# Patient Record
Sex: FEMALE | Race: WHITE | NOT HISPANIC OR LATINO | Employment: OTHER | URBAN - METROPOLITAN AREA
[De-identification: names, ages, dates, MRNs, and addresses within clinical notes are randomized per-mention and may not be internally consistent; named-entity substitution may affect disease eponyms.]

---

## 2017-01-02 ENCOUNTER — HOSPITAL ENCOUNTER (OUTPATIENT)
Dept: RADIOLOGY | Facility: HOSPITAL | Age: 82
Discharge: HOME | End: 2017-01-02
Payer: MEDICARE

## 2017-01-02 ENCOUNTER — TRANSCRIBE ORDERS (OUTPATIENT)
Dept: ADMINISTRATIVE | Facility: HOSPITAL | Age: 82
End: 2017-01-02

## 2017-01-02 DIAGNOSIS — M19.011 OSTEOARTHRITIS OF RIGHT SHOULDER, UNSPECIFIED OSTEOARTHRITIS TYPE: ICD-10-CM

## 2017-01-02 DIAGNOSIS — M19.011 OSTEOARTHRITIS OF RIGHT SHOULDER, UNSPECIFIED OSTEOARTHRITIS TYPE: Primary | ICD-10-CM

## 2017-01-02 PROCEDURE — 73030 X-RAY EXAM OF SHOULDER: CPT

## 2017-01-11 ENCOUNTER — TRANSCRIBE ORDERS (OUTPATIENT)
Dept: ADMINISTRATIVE | Facility: HOSPITAL | Age: 82
End: 2017-01-11

## 2017-01-11 ENCOUNTER — HOSPITAL ENCOUNTER (OUTPATIENT)
Dept: NON INVASIVE DIAGNOSTICS | Facility: HOSPITAL | Age: 82
Discharge: HOME | End: 2017-01-11
Attending: INTERNAL MEDICINE
Payer: MEDICARE

## 2017-01-11 ENCOUNTER — HOSPITAL ENCOUNTER (OUTPATIENT)
Dept: NON INVASIVE DIAGNOSTICS | Facility: HOSPITAL | Age: 82
Discharge: HOME | End: 2017-01-11
Attending: SURGERY
Payer: MEDICARE

## 2017-01-11 DIAGNOSIS — I35.9 NONRHEUMATIC AORTIC VALVE DISORDER: ICD-10-CM

## 2017-01-11 DIAGNOSIS — I25.10 ATHEROSCLEROTIC HEART DISEASE OF NATIVE CORONARY ARTERY WITHOUT ANGINA PECTORIS: ICD-10-CM

## 2017-01-11 DIAGNOSIS — I10 ESSENTIAL (PRIMARY) HYPERTENSION: ICD-10-CM

## 2017-01-11 DIAGNOSIS — E78.5 HYPERLIPIDEMIA: ICD-10-CM

## 2017-01-11 PROCEDURE — 93306 TTE W/DOPPLER COMPLETE: CPT

## 2017-01-11 PROCEDURE — 93880 EXTRACRANIAL BILAT STUDY: CPT

## 2017-01-18 ENCOUNTER — ALLSCRIPTS OFFICE VISIT (OUTPATIENT)
Dept: OTHER | Facility: OTHER | Age: 82
End: 2017-01-18

## 2017-02-20 ENCOUNTER — TRANSCRIBE ORDERS (OUTPATIENT)
Dept: ADMINISTRATIVE | Facility: HOSPITAL | Age: 82
End: 2017-02-20

## 2017-02-20 ENCOUNTER — HOSPITAL ENCOUNTER (OUTPATIENT)
Dept: RADIOLOGY | Facility: HOSPITAL | Age: 82
Discharge: HOME | End: 2017-02-20
Attending: INTERNAL MEDICINE
Payer: MEDICARE

## 2017-02-20 ENCOUNTER — ALLSCRIPTS OFFICE VISIT (OUTPATIENT)
Dept: OTHER | Facility: OTHER | Age: 82
End: 2017-02-20

## 2017-02-20 DIAGNOSIS — R07.89 OTHER CHEST PAIN: ICD-10-CM

## 2017-02-20 DIAGNOSIS — N64.4 SORENESS BREAST: Primary | ICD-10-CM

## 2017-02-20 DIAGNOSIS — N64.4 MASTODYNIA: ICD-10-CM

## 2017-02-20 DIAGNOSIS — I25.10 ATHEROSCLEROTIC HEART DISEASE OF NATIVE CORONARY ARTERY WITHOUT ANGINA PECTORIS: ICD-10-CM

## 2017-02-20 PROCEDURE — 71020 HB CHEST X-RAY 2VW FRONTAL&LATL: CPT

## 2017-02-21 ENCOUNTER — GENERIC CONVERSION - ENCOUNTER (OUTPATIENT)
Dept: OTHER | Facility: OTHER | Age: 82
End: 2017-02-21

## 2017-02-21 ENCOUNTER — APPOINTMENT (OUTPATIENT)
Dept: LAB | Facility: HOSPITAL | Age: 82
End: 2017-02-21
Attending: INTERNAL MEDICINE
Payer: MEDICARE

## 2017-02-21 DIAGNOSIS — I25.10 ATHEROSCLEROTIC HEART DISEASE OF NATIVE CORONARY ARTERY WITHOUT ANGINA PECTORIS: ICD-10-CM

## 2017-02-21 LAB
ALBUMIN SERPL BCP-MCNC: 3.5 G/DL (ref 3.5–5)
ALP SERPL-CCNC: 70 U/L (ref 46–116)
ALT SERPL W P-5'-P-CCNC: 20 U/L (ref 12–78)
ANION GAP SERPL CALCULATED.3IONS-SCNC: 9 MMOL/L (ref 4–13)
AST SERPL W P-5'-P-CCNC: 16 U/L (ref 5–45)
BASOPHILS # BLD AUTO: 0.02 THOUSANDS/ΜL (ref 0–0.1)
BASOPHILS NFR BLD AUTO: 0 % (ref 0–1)
BILIRUB SERPL-MCNC: 0.5 MG/DL (ref 0.2–1)
BUN SERPL-MCNC: 19 MG/DL (ref 5–25)
CALCIUM SERPL-MCNC: 8.9 MG/DL (ref 8.3–10.1)
CHLORIDE SERPL-SCNC: 105 MMOL/L (ref 100–108)
CHOLEST SERPL-MCNC: 162 MG/DL (ref 50–200)
CO2 SERPL-SCNC: 29 MMOL/L (ref 21–32)
CREAT SERPL-MCNC: 0.88 MG/DL (ref 0.6–1.3)
EOSINOPHIL # BLD AUTO: 0.06 THOUSAND/ΜL (ref 0–0.61)
EOSINOPHIL NFR BLD AUTO: 1 % (ref 0–6)
ERYTHROCYTE [DISTWIDTH] IN BLOOD BY AUTOMATED COUNT: 14.9 % (ref 11.6–15.1)
GFR SERPL CREATININE-BSD FRML MDRD: >60 ML/MIN/1.73SQ M
GLUCOSE SERPL-MCNC: 87 MG/DL (ref 65–140)
HCT VFR BLD AUTO: 41.3 % (ref 34.8–46.1)
HDLC SERPL-MCNC: 82 MG/DL (ref 40–60)
HGB BLD-MCNC: 13.3 G/DL (ref 11.5–15.4)
LDLC SERPL CALC-MCNC: 69 MG/DL (ref 0–100)
LYMPHOCYTES # BLD AUTO: 2.45 THOUSANDS/ΜL (ref 0.6–4.47)
LYMPHOCYTES NFR BLD AUTO: 34 % (ref 14–44)
MCH RBC QN AUTO: 28.9 PG (ref 26.8–34.3)
MCHC RBC AUTO-ENTMCNC: 32.2 G/DL (ref 31.4–37.4)
MCV RBC AUTO: 90 FL (ref 82–98)
MONOCYTES # BLD AUTO: 0.84 THOUSAND/ΜL (ref 0.17–1.22)
MONOCYTES NFR BLD AUTO: 12 % (ref 4–12)
NEUTROPHILS # BLD AUTO: 3.85 THOUSANDS/ΜL (ref 1.85–7.62)
NEUTS SEG NFR BLD AUTO: 53 % (ref 43–75)
PLATELET # BLD AUTO: 159 THOUSANDS/UL (ref 149–390)
PMV BLD AUTO: 11.3 FL (ref 8.9–12.7)
POTASSIUM SERPL-SCNC: 3.7 MMOL/L (ref 3.5–5.3)
PROT SERPL-MCNC: 7.1 G/DL (ref 6.4–8.2)
RBC # BLD AUTO: 4.6 MILLION/UL (ref 3.81–5.12)
SODIUM SERPL-SCNC: 143 MMOL/L (ref 136–145)
TRIGL SERPL-MCNC: 55 MG/DL
WBC # BLD AUTO: 7.22 THOUSAND/UL (ref 4.31–10.16)

## 2017-02-21 PROCEDURE — 85025 COMPLETE CBC W/AUTO DIFF WBC: CPT

## 2017-02-21 PROCEDURE — 80053 COMPREHEN METABOLIC PANEL: CPT

## 2017-02-21 PROCEDURE — 80061 LIPID PANEL: CPT

## 2017-02-21 PROCEDURE — 36415 COLL VENOUS BLD VENIPUNCTURE: CPT

## 2017-02-23 ENCOUNTER — GENERIC CONVERSION - ENCOUNTER (OUTPATIENT)
Dept: OTHER | Facility: OTHER | Age: 82
End: 2017-02-23

## 2017-02-23 ENCOUNTER — HOSPITAL ENCOUNTER (OUTPATIENT)
Dept: MAMMOGRAPHY | Facility: CLINIC | Age: 82
Discharge: HOME | End: 2017-02-23
Payer: MEDICARE

## 2017-02-23 ENCOUNTER — HOSPITAL ENCOUNTER (OUTPATIENT)
Dept: ULTRASOUND IMAGING | Facility: CLINIC | Age: 82
Discharge: HOME | End: 2017-02-23
Payer: MEDICARE

## 2017-02-23 DIAGNOSIS — N64.4 MASTODYNIA: ICD-10-CM

## 2017-02-23 DIAGNOSIS — N64.4 SORENESS BREAST: ICD-10-CM

## 2017-02-23 PROCEDURE — 76642 ULTRASOUND BREAST LIMITED: CPT

## 2017-02-23 PROCEDURE — G0204 DX MAMMO INCL CAD BI: HCPCS

## 2017-02-24 ENCOUNTER — GENERIC CONVERSION - ENCOUNTER (OUTPATIENT)
Dept: OTHER | Facility: OTHER | Age: 82
End: 2017-02-24

## 2017-04-18 ENCOUNTER — ALLSCRIPTS OFFICE VISIT (OUTPATIENT)
Dept: OTHER | Facility: OTHER | Age: 82
End: 2017-04-18

## 2017-04-18 DIAGNOSIS — I65.29 OCCLUSION AND STENOSIS OF UNSPECIFIED CAROTID ARTERY: ICD-10-CM

## 2017-04-18 DIAGNOSIS — Z13.820 ENCOUNTER FOR SCREENING FOR OSTEOPOROSIS: ICD-10-CM

## 2017-07-26 ENCOUNTER — ALLSCRIPTS OFFICE VISIT (OUTPATIENT)
Dept: OTHER | Facility: OTHER | Age: 82
End: 2017-07-26

## 2017-09-07 ENCOUNTER — APPOINTMENT (EMERGENCY)
Dept: CT IMAGING | Facility: HOSPITAL | Age: 82
End: 2017-09-07
Payer: MEDICARE

## 2017-09-07 ENCOUNTER — HOSPITAL ENCOUNTER (EMERGENCY)
Facility: HOSPITAL | Age: 82
Discharge: HOME | End: 2017-09-07
Payer: MEDICARE

## 2017-09-07 VITALS
WEIGHT: 115 LBS | HEIGHT: 64 IN | DIASTOLIC BLOOD PRESSURE: 73 MMHG | TEMPERATURE: 98.1 F | SYSTOLIC BLOOD PRESSURE: 145 MMHG | BODY MASS INDEX: 19.63 KG/M2 | OXYGEN SATURATION: 100 % | HEART RATE: 54 BPM | RESPIRATION RATE: 20 BRPM

## 2017-09-07 DIAGNOSIS — S00.81XA ABRASION OF FOREHEAD: ICD-10-CM

## 2017-09-07 DIAGNOSIS — S00.93XA CONTUSION OF HEAD: ICD-10-CM

## 2017-09-07 DIAGNOSIS — T14.8XXA MULTIPLE SKIN TEARS: ICD-10-CM
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
## 2017-09-07 PROCEDURE — 72125 CT NECK SPINE W/O DYE: CPT

## 2017-09-07 PROCEDURE — 70450 CT HEAD/BRAIN W/O DYE: CPT

## 2017-09-07 PROCEDURE — 99284 EMERGENCY DEPT VISIT MOD MDM: CPT

## 2017-09-11 ENCOUNTER — APPOINTMENT (OUTPATIENT)
Dept: WOUND CARE | Facility: HOSPITAL | Age: 82
End: 2017-09-11
Attending: PREVENTIVE MEDICINE
Payer: MEDICARE

## 2017-09-11 PROCEDURE — 99214 OFFICE O/P EST MOD 30 MIN: CPT | Performed by: PREVENTIVE MEDICINE

## 2017-09-25 ENCOUNTER — APPOINTMENT (OUTPATIENT)
Dept: WOUND CARE | Facility: HOSPITAL | Age: 82
End: 2017-09-25
Attending: PREVENTIVE MEDICINE
Payer: MEDICARE

## 2017-09-25 PROCEDURE — 99212 OFFICE O/P EST SF 10 MIN: CPT | Performed by: PREVENTIVE MEDICINE

## 2017-09-28 DIAGNOSIS — I65.29 OCCLUSION AND STENOSIS OF UNSPECIFIED CAROTID ARTERY: ICD-10-CM

## 2017-10-05 ENCOUNTER — HOSPITAL ENCOUNTER (OUTPATIENT)
Dept: NON INVASIVE DIAGNOSTICS | Facility: HOSPITAL | Age: 82
Discharge: HOME | End: 2017-10-05
Attending: SURGERY
Payer: MEDICARE

## 2017-10-05 DIAGNOSIS — I65.29 OCCLUSION AND STENOSIS OF UNSPECIFIED CAROTID ARTERY: ICD-10-CM

## 2017-10-05 PROCEDURE — 93880 EXTRACRANIAL BILAT STUDY: CPT

## 2017-11-10 ENCOUNTER — OFFICE VISIT (OUTPATIENT)
Dept: URGENT CARE | Facility: CLINIC | Age: 82
End: 2017-11-10
Payer: MEDICARE

## 2017-11-10 PROCEDURE — G0463 HOSPITAL OUTPT CLINIC VISIT: HCPCS

## 2017-11-10 PROCEDURE — 99213 OFFICE O/P EST LOW 20 MIN: CPT

## 2017-11-10 PROCEDURE — 69210 REMOVE IMPACTED EAR WAX UNI: CPT

## 2017-11-11 NOTE — PROGRESS NOTES
Assessment    1  Bilateral impacted cerumen (380 4) (H61 23)    Plan  Bilateral impacted cerumen    · Removal Impacted Cerumen Requiring Instrumentation one or both ears - POC;Status:Complete;   Done: [id number]    Discussion/Summary  Discussion Summary:   F/U PCP as needed  Understands and agrees with treatment plan: The treatment plan was reviewed with the patient/guardian  The patient/guardian understands and agrees with the treatment plan      Chief Complaint  Chief Complaint Free Text Note Form: Pt reports she was told by the hearing aide store that she needed waxed removed from her right ear  History of Present Illness  HPI: Pt here for cerumen removal, told by the hearing aid store she has excessive wax in both ears  She does have decreased hearing  Hospital Based Practices Required Assessment:  Pain Assessment  the patient states they do not have pain  Abuse And Domestic Violence Screen   Domestic violence screen not done today  Reason DV Screen not done: family present   Depression And Suicide Screen  Suicide screen not done today  -- Reason suicide screen not done: family present  Prefered Language is  [place]  Primary Language is  English  Review of Systems  Focused-Female:  Constitutional: No fever, no chills, feels well, no tiredness, no recent weight gain or loss  ENT: as noted in HPI  Active Problems  1  Active advance directive (V49 89) (Z78 9)   2  Aortic valve disorder (424 1) (I35 9)   3  Arteriosclerosis of carotid artery (433 10) (I65 29)   4  Arteriosclerosis of coronary artery (414 00) (I25 10)   5  Bilateral edema of lower extremity (782 3) (R60 0)   6  Breast cancer screening (V76 10) (Z12 39)   7  Breast tenderness in female (611 71) (N64 4)   8  CABG   9  Cataract (366 9) (H26 9)   10  Cervical spine arthritis (721 0) (M46 92)   11  Cholelithiasis without cholecystitis (574 20) (K80 20)   12  Chronic cervical radiculopathy (723 4) (M54 12)   13   Coronary artery disease (414 00) (I25 10)   14  Cystitis, chronic (595 2) (N30 20)   15  Encounter for screening mammogram for breast cancer (V76 12) (Z12 31)   16  Essential hypertension (401 9) (I10)   17  Hyperlipidemia (272 4) (E78 5)   18  Leg wound, right (891 0) (S81 801A)   19  Denied: History of Mental health disorder   20  Mitral insufficiency and aortic stenosis (396 2) (I08 0)   21  Nausea (787 02) (R11 0)   22  Need for influenza vaccination (V04 81) (Z23)   23  Neural foraminal stenosis of cervical spine (723 0) (M99 81)   24  Occipital neuralgia of right side (723 8) (M54 81)   25  Open wound of hand (882 0) (S61 409A)   26  Open wound of lower leg, right, initial encounter (891 0) (S81 801A)   27  Open wound of lower leg, right, subsequent encounter (V58 89,891 0) (S81 801D)   28  Osteoarthritis (715 90) (M19 90)   29  Osteoporosis screening (V82 81) (Z13 820)   30  Pain in wrist, unspecified laterality (719 43) (M25 539)   31  Pain syndrome, chronic (338 4) (G89 4)   32  Pelvic pain (R10 2)   33  Peripheral neuropathy (356 9) (G62 9)   34  Right cervical radiculopathy (723 4) (M54 12)   35  S/P AVR (aortic valve replacement) (V43 3) (Z95 2)   36  Screening for genitourinary condition (V81 6) (Z13 89)   37  Spondylolisthesis, acquired (738 4) (M43 10)   38  Denied: History of Substance abuse   44  Thrombocytopenia (287 5) (D69 6)   40  Tracheobronchitis (490) (J40)   41  Traumatic open wound of left lower leg (891 0) (S81 802A)   42  Unspecified atrial fibrillation (427 31) (I48 91)   43  Upper respiratory infection (465 9) (J06 9)   44  Urinary tract infection (599 0) (N39 0)   45  Wound of left lower extremity, initial encounter (894 0) (S81 802A)   46  Wound of left lower extremity, subsequent encounter (V58 89,894 0) (U94 221A)    Past Medical History  1  History of Abdominal pain, epigastric (789 06) (R10 13)   2  History of Acute myocardial infarction (410 90) (I21 9)   3   History of Asymptomatic postmenopausal status (V49 81) (Z78 0)   4  History of CAD (coronary artery disease) (414 00) (I25 10)   5  History of Cellulitis and abscess (682 9) (L03 90,L02 91)   6  History of Cholecystitis (575 10) (K81 9)   7  History of Closed Colles' Fracture Of The Left Wrist (813 41)   8  History of Closed Fracture Of The Radius (813 81)   9  History of Dyspnea (786 09) (R06 00)   10  History of Hair loss disorder (704 00) (L65 9)   11  History of Hematuria (599 70) (R31 9)   12  History of being hospitalized (V13 9) (Z92 89)   13  History of chest pain (V13 89) (Z87 898)   14  History of hypercholesterolemia (V12 29) (Z86 39)   15  History of hypertension (V12 59) (Z86 79)   16  History of sick sinus syndrome (V12 59) (Z86 79)   17  History of urinary tract infection (V13 02) (Z87 440)   18  History of Incomplete bladder emptying (788 21) (R33 9)   19  History of Leg wound, left (891 0) (S81 802A)   20  History of Malaise and fatigue (780 79) (R53 81,R53 83)   21  Denied: History of Mental health disorder   22  Denied: History of Substance abuse   23  History of Urinary frequency (788 41) (R35 0)   24  History of UTI (urinary tract infection) (599 0) (N39 0)    Family History  Mother    1  Family history of    2  Family history of Diabetes Mellitus (V18 0)   3  Family history of Heart disease   4  Family history of Hypertension (V17 49)   5  No family history of mental disorder   6  Denied: Family history of Substance abuse in family  Father    9  Family history of    6  No family history of mental disorder   9  Family history of Prostate cancer   10  Denied: Family history of Substance abuse in family  Brother    6  Family history of    15  Family history of Dementia  Maternal Grandmother    15  Family history of Heart disease   14  Family history of Hypertension (V17 49)  Maternal Grandfather    15  Family history of Heart disease  Family History    12   Family history of hypertension (V17 49) (Z82 49)   17  Family history of Osteoporosis (V17 81)    Social History   · Active advance directive (V49 89) (Z78 9)   · Being A Social Drinker   · Caffeine use (V49 89) (F15 90)   · Cultural background   · Denied: History of Drug Use   · Exercise habits   · Former smoker (V15 82) (Z87 891)   · Good dental hygiene   · Living Situation: Supportive and safe   · Never a smoker   · Primary spoken language English   · Racial background   · Retired   · Denied: History of Unemployed, not looking for work   · Denied: History of Uses  drugs   ·     Surgical History    1  History of CABG   2  History of Cataract Extraction   3  History of Heart Valve Replacement    Current Meds   1  AmLODIPine Besylate 5 MG Oral Tablet; TAKE ONE TABLET BY MOUTH EVERY DAY AS DIRECTED; Therapy: 44XNB7832 to ([phone])  Requested for: 81DPF6517; Last Rx:99Yje6228 Ordered   2  Aspirin EC Low Dose 81 MG Oral Tablet Delayed Release; TAKE 1 TABLET DAILY; Therapy: (Recorded:92Ibj1745) to Recorded   3  Atorvastatin Calcium 20 MG Oral Tablet; take one tablet by mouth every day; Therapy: 15PUW5980 to (Scott Simmons)  Requested for: 95EIW0847; Last Rx:05Oct2017 Ordered   4  CoQ10 CAPS; TAKE 1 CAPSULE DAILY WITH A MEAL; Therapy: (Recorded:15Hvb9665) to Recorded   5  Flonase Allergy Relief 50 MCG/ACT Nasal Suspension; 1 spray each nostril bid x 7 days; Therapy: 38Npa7634 to (Evaluate:79Wmo0195)  Requested for: 30Xvd3782; Last Rx:81Rfj0796 Ordered   6  Folic Acid 1 MG Oral Tablet; TAKE 1 TABLET DAILY AS DIRECTED; Therapy: 30MTO6013 to (Evaluate:01Jan2017)  Requested for: 57CGL4592; Last Rx:25Kob3086 Ordered   7  Multaq 400 MG Oral Tablet; TAKE ONE (1) TABLET(S) TWICE DAILY WITH MORNING AND EVENING MEAL; Therapy: 68KAJ3648 to (Kaylynn Darnell)  Requested for: 71TRD7103; Last Rx:26Oct2017 Ordered   8  Ocuvite TABS; TAKE 1 TABLET DAILY; Therapy: (Recorded:41Zea4660) to Recorded   9   Omega 3 CAPS; 1000 mg CAPSULE---1 DAILY; Therapy: (Recorded:96Pbf4996) to Recorded   10  Tramadol-Acetaminophen 37 5-325 MG Oral Tablet; TAKE 1 TABLET 4 times daily PRN  neck pain; Therapy: 79XVA1411 to (Evaluate:97Ilg4571); Last Rx:30Oct2017 Ordered    Allergies    1  Heparin    2  No Known Environmental Allergies   3  No Known Food Allergies    Vitals  Signs   Recorded: 00UAB9712 02:52PM   Temperature: 96 6 F  Heart Rate: 56  Respiration: 16  Systolic: 014  Diastolic: 78  Height: 5 ft 3 in  Weight: 124 lb   BMI Calculated: 21 97  BSA Calculated: 1 58  O2 Saturation: 97    Physical Exam   Constitutional  General appearance: No acute distress, well appearing and well nourished  Ears, Nose, Mouth, and Throat  Otoscopic examination: Abnormal  -- bilateral cerumen impaction  Procedure   Procedure: cerumen removal   Indication: tympanic membrane(s) could not be visualized and cerumen impaction in both ears  Prep: hydrogen peroxide was placed in the canal prior to the procedure  Procedure Note: The procedure was performed by the Provider--   The procedure required significant time and effort to remove the cerumen  A otoscope was placed in the ear canal(s) to visualize the ear canal debris  The ear was cleaned by using warm water irrigation-- and-- a wire loop  The procedure was successful  Post-Procedure:  Patient Status: the patient tolerated the procedure well  Patient instructions: dry ear precautions-- and-- avoid using q-tips  Follow-up as needed        Signatures   Electronically signed by : Levi Lawton DO; Nov 10 2017  3:53PM EST                       (Author)

## 2017-11-27 ENCOUNTER — APPOINTMENT (EMERGENCY)
Dept: RADIOLOGY | Facility: HOSPITAL | Age: 82
End: 2017-11-27
Payer: MEDICARE

## 2017-11-27 ENCOUNTER — HOSPITAL ENCOUNTER (EMERGENCY)
Facility: HOSPITAL | Age: 82
Discharge: HOME | End: 2017-11-28
Attending: EMERGENCY MEDICINE | Admitting: EMERGENCY MEDICINE
Payer: MEDICARE

## 2017-11-27 DIAGNOSIS — I48.91 ATRIAL FIBRILLATION WITH CONTROLLED VENTRICULAR RESPONSE (HCC): Primary | ICD-10-CM

## 2017-11-27 LAB
BASOPHILS # BLD AUTO: 0.03 THOUSANDS/ΜL (ref 0–0.1)
BASOPHILS NFR BLD AUTO: 0 % (ref 0–1)
EOSINOPHIL # BLD AUTO: 0.13 THOUSAND/ΜL (ref 0–0.61)
EOSINOPHIL NFR BLD AUTO: 2 % (ref 0–6)
ERYTHROCYTE [DISTWIDTH] IN BLOOD BY AUTOMATED COUNT: 13.4 % (ref 11.6–15.1)
HCT VFR BLD AUTO: 40.1 % (ref 34.8–46.1)
HGB BLD-MCNC: 13.4 G/DL (ref 11.5–15.4)
LYMPHOCYTES # BLD AUTO: 3.17 THOUSANDS/ΜL (ref 0.6–4.47)
LYMPHOCYTES NFR BLD AUTO: 40 % (ref 14–44)
MCH RBC QN AUTO: 30.9 PG (ref 26.8–34.3)
MCHC RBC AUTO-ENTMCNC: 33.4 G/DL (ref 31.4–37.4)
MCV RBC AUTO: 93 FL (ref 82–98)
MONOCYTES # BLD AUTO: 0.98 THOUSAND/ΜL (ref 0.17–1.22)
MONOCYTES NFR BLD AUTO: 13 % (ref 4–12)
NEUTROPHILS # BLD AUTO: 3.53 THOUSANDS/ΜL (ref 1.85–7.62)
NEUTS SEG NFR BLD AUTO: 45 % (ref 43–75)
PLATELET # BLD AUTO: 154 THOUSANDS/UL (ref 149–390)
PMV BLD AUTO: 10.9 FL (ref 8.9–12.7)
RBC # BLD AUTO: 4.33 MILLION/UL (ref 3.81–5.12)
WBC # BLD AUTO: 7.84 THOUSAND/UL (ref 4.31–10.16)

## 2017-11-27 PROCEDURE — 85730 THROMBOPLASTIN TIME PARTIAL: CPT | Performed by: EMERGENCY MEDICINE

## 2017-11-27 PROCEDURE — 80048 BASIC METABOLIC PNL TOTAL CA: CPT | Performed by: EMERGENCY MEDICINE

## 2017-11-27 PROCEDURE — 36415 COLL VENOUS BLD VENIPUNCTURE: CPT | Performed by: EMERGENCY MEDICINE

## 2017-11-27 PROCEDURE — 93005 ELECTROCARDIOGRAM TRACING: CPT | Performed by: EMERGENCY MEDICINE

## 2017-11-27 PROCEDURE — 84484 ASSAY OF TROPONIN QUANT: CPT | Performed by: EMERGENCY MEDICINE

## 2017-11-27 PROCEDURE — 85610 PROTHROMBIN TIME: CPT | Performed by: EMERGENCY MEDICINE

## 2017-11-27 PROCEDURE — 85025 COMPLETE CBC W/AUTO DIFF WBC: CPT | Performed by: EMERGENCY MEDICINE

## 2017-11-28 ENCOUNTER — APPOINTMENT (EMERGENCY)
Dept: RADIOLOGY | Facility: HOSPITAL | Age: 82
End: 2017-11-28
Payer: MEDICARE

## 2017-11-28 ENCOUNTER — GENERIC CONVERSION - ENCOUNTER (OUTPATIENT)
Dept: OTHER | Facility: OTHER | Age: 82
End: 2017-11-28

## 2017-11-28 VITALS
WEIGHT: 115 LBS | HEIGHT: 64 IN | DIASTOLIC BLOOD PRESSURE: 85 MMHG | BODY MASS INDEX: 19.63 KG/M2 | OXYGEN SATURATION: 96 % | RESPIRATION RATE: 16 BRPM | SYSTOLIC BLOOD PRESSURE: 188 MMHG | HEART RATE: 50 BPM

## 2017-11-28 LAB
ANION GAP SERPL CALCULATED.3IONS-SCNC: 11 MMOL/L (ref 4–13)
APTT PPP: 26 SECONDS (ref 23–35)
ATRIAL RATE: 91 BPM
BUN SERPL-MCNC: 26 MG/DL (ref 5–25)
CALCIUM SERPL-MCNC: 8.7 MG/DL (ref 8.3–10.1)
CHLORIDE SERPL-SCNC: 104 MMOL/L (ref 100–108)
CLARITY, POC: CLEAR
CO2 SERPL-SCNC: 26 MMOL/L (ref 21–32)
COLOR, POC: YELLOW
CREAT SERPL-MCNC: 1.16 MG/DL (ref 0.6–1.3)
EXT BILIRUBIN, UA: NEGATIVE
EXT BLOOD URINE: NORMAL
EXT GLUCOSE, UA: NEGATIVE
EXT KETONES: NEGATIVE
EXT NITRITE, UA: NEGATIVE
EXT PH, UA: 6
EXT PROTEIN, UA: NEGATIVE
EXT SPECIFIC GRAVITY, UA: 1
EXT UROBILINOGEN: 0.2
GFR SERPL CREATININE-BSD FRML MDRD: 42 ML/MIN/1.73SQ M
GLUCOSE SERPL-MCNC: 103 MG/DL (ref 65–140)
INR PPP: 0.95 (ref 0.86–1.16)
POTASSIUM SERPL-SCNC: 3.7 MMOL/L (ref 3.5–5.3)
PROTHROMBIN TIME: 12.5 SECONDS (ref 12.1–14.4)
QRS AXIS: 38 DEGREES
QRSD INTERVAL: 90 MS
QT INTERVAL: 384 MS
QTC INTERVAL: 428 MS
SODIUM SERPL-SCNC: 141 MMOL/L (ref 136–145)
T WAVE AXIS: 121 DEGREES
TROPONIN I SERPL-MCNC: <0.02 NG/ML
VENTRICULAR RATE: 75 BPM
WBC # BLD EST: NORMAL 10*3/UL

## 2017-11-28 PROCEDURE — 71010 HB CHEST X-RAY 1 VIEW FRONTAL (PORTABLE): CPT

## 2017-11-28 PROCEDURE — 99285 EMERGENCY DEPT VISIT HI MDM: CPT

## 2017-11-28 PROCEDURE — 81002 URINALYSIS NONAUTO W/O SCOPE: CPT | Performed by: EMERGENCY MEDICINE

## 2017-11-28 RX ORDER — FLUTICASONE PROPIONATE 50 MCG
SPRAY, SUSPENSION (ML) NASAL
COMMUNITY
Start: 2016-07-21 | End: 2018-02-23 | Stop reason: SDUPTHER

## 2017-11-28 RX ORDER — AMLODIPINE BESYLATE 5 MG/1
TABLET ORAL
COMMUNITY
Start: 2016-07-05 | End: 2018-06-26 | Stop reason: SDUPTHER

## 2017-11-28 RX ORDER — FOLIC ACID 1 MG/1
800 TABLET ORAL
COMMUNITY
Start: 2011-09-12 | End: 2018-02-28 | Stop reason: CLARIF

## 2017-11-28 RX ORDER — VITAMIN B COMPLEX
TABLET ORAL
COMMUNITY
End: 2018-02-01 | Stop reason: ALTCHOICE

## 2017-11-28 RX ORDER — CHLORAL HYDRATE 500 MG
CAPSULE ORAL
COMMUNITY
End: 2018-02-22 | Stop reason: SDUPTHER

## 2017-11-28 RX ORDER — ATORVASTATIN CALCIUM 20 MG/1
TABLET, FILM COATED ORAL
COMMUNITY
Start: 2016-07-05 | End: 2018-07-09 | Stop reason: SDUPTHER

## 2017-11-28 NOTE — DISCHARGE INSTRUCTIONS
A-fib (Atrial Fibrillation)   WHAT YOU NEED TO KNOW:   A-fib may come and go, or it may be a long-term condition  A-fib can cause blood clots, stroke, or heart failure  These conditions may become life-threatening  It is important to treat and manage a-fib to help prevent a blood clot, stroke, or heart failure  DISCHARGE INSTRUCTIONS:   Call 911 for any of the following:   · You have any of the following signs of a heart attack:      ¨ Squeezing, pressure, or pain in your chest that lasts longer than 5 minutes or returns    ¨ Discomfort or pain in your back, neck, jaw, stomach, or arm     ¨ Trouble breathing    ¨ Nausea or vomiting    ¨ Lightheadedness or a sudden cold sweat, especially with chest pain or trouble breathing    · You have any of the following signs of a stroke:      ¨ Numbness or drooping on one side of your face     ¨ Weakness in an arm or leg    ¨ Confusion or difficulty speaking    ¨ Dizziness, a severe headache, or vision loss  Return to the emergency department if:  You have any of the following signs of a blood clot:  · You feel lightheaded, are short of breath, and have chest pain  · You cough up blood  · You have swelling, redness, pain, or warmth in your arm or leg  Contact your cardiologist or healthcare provider if:   · Your target heart rate is not in the range it should be  · You have new or worsening swelling in your legs, feet, ankles, or abdomen  · You are short of breath, even at rest      · You have questions or concerns about your condition or care  Medicines: You may need any of the following:  · Heart medicines  help control your heart rate and rhythm  You may need more than one medicine to treat your symptoms  · Blood thinners    help prevent blood clots  Examples of blood thinners include heparin and warfarin  Clots can cause strokes, heart attacks, and death   The following are general safety guidelines to follow while you are taking a blood thinner:    ¨ Watch for bleeding and bruising while you take blood thinners  Watch for bleeding from your gums or nose  Watch for blood in your urine and bowel movements  Use a soft washcloth on your skin, and a soft toothbrush to brush your teeth  This can keep your skin and gums from bleeding  If you shave, use an electric shaver  Do not play contact sports  ¨ Tell your dentist and other healthcare providers that you take anticoagulants  Wear a bracelet or necklace that says you take this medicine  ¨ Do not start or stop any medicines unless your healthcare provider tells you to  Many medicines cannot be used with blood thinners  ¨ Tell your healthcare provider right away if you forget to take the medicine, or if you take too much  ¨ Warfarin  is a blood thinner that you may need to take  The following are things you should be aware of if you take warfarin  § Foods and medicines can affect the amount of warfarin in your blood  Do not make major changes to your diet while you take warfarin  Warfarin works best when you eat about the same amount of vitamin K every day  Vitamin K is found in green leafy vegetables and certain other foods  Ask for more information about what to eat when you are taking warfarin  § You will need to see your healthcare provider for follow-up visits when you are on warfarin  You will need regular blood tests  These tests are used to decide how much medicine you need  · Antiplatelets , such as aspirin, help prevent blood clots  Take your antiplatelet medicine exactly as directed  These medicines make it more likely for you to bleed or bruise  If you are told to take aspirin, do not take acetaminophen or ibuprofen instead  · Take your medicine as directed  Contact your healthcare provider if you think your medicine is not helping or if you have side effects  Tell him or her if you are allergic to any medicine   Keep a list of the medicines, vitamins, and herbs you take  Include the amounts, and when and why you take them  Bring the list or the pill bottles to follow-up visits  Carry your medicine list with you in case of an emergency  Follow up with your cardiologist as directed: You will need regular blood tests and monitoring  Write down your questions so you remember to ask them during your visits  Manage A-fib:   · Know your target heart rate  Learn how to take your pulse and monitor your heart rate  · Manage other health conditions  This includes high blood pressure, sleep apnea, thyroid disease, diabetes, and other heart conditions  Take medicine as directed and follow your treatment plan  · Limit or do not drink alcohol  Alcohol can make a-fib hard to manage  Ask your healthcare provider if it is safe for you to drink alcohol  A drink of alcohol is 12 ounces of beer, 5 ounces of wine, or 1½ ounces of liquor  · Do not smoke  Nicotine and other chemicals in cigarettes and cigars can cause heart and lung damage  Ask your healthcare provider for information if you currently smoke and need help to quit  E-cigarettes or smokeless tobacco still contain nicotine  Talk to your healthcare provider before you use these products  · Eat heart-healthy foods  Heart healthy foods will help keep your cholesterol low  These include fruits, vegetables, whole-grain breads, low-fat dairy products, beans, lean meats, and fish  Replace butter and margarine with heart-healthy oils such as olive oil and canola oil  · Maintain a healthy weight  Ask your healthcare provider how much you should weigh  Ask him to help you create a weight loss plan if you are overweight  · Exercise for 30 minutes  most days of the week  Ask your healthcare provider about the best exercise plan for you  © 2017 [address] [employer] Information is for End User's use only and may not be sold, redistributed or otherwise used for commercial purposes   All illustrations and images included in CareNotes® are the copyrighted property of [employer]  or Nilesh Loomis  The above information is an  only  It is not intended as medical advice for individual conditions or treatments  Talk to your doctor, nurse or pharmacist before following any medical regimen to see if it is safe and effective for you

## 2017-11-28 NOTE — ED PROVIDER NOTES
History  Chief Complaint   Patient presents with    Rapid Heart Rate     PT to ED with feeling of rapid heart rate, weakness  Hx of Afib  History from patient daughters and son-in-law  This 70-year-old female with history of chronic atrial fibrillation called family this evening because she was feeling palpitations  She feels intermittent strong beats in her heart tonight  It is not been rapid in a sustained matter  There is no associated is dyspnea, nausea, diaphoresis or chest pain  She admits she had symptoms several days ago as well  At that time she had a sustained rapid heart rate for about 2 hours  She has had no recent chest pain, fever, cough, signs of illness, change in medications, change in diet or activity  She went for her normal walks yesterday and today without symptoms  Prior to Admission Medications   Prescriptions Last Dose Informant Patient Reported? Taking?    ASPIRIN EC PO   Yes No   Sig: Take 81 mg by mouth   Coenzyme Q10 (COQ10) 100 MG CAPS   Yes No   Sig: Take by mouth   Multiple Vitamins-Minerals (OCUVITE EXTRA PO)   Yes No   Sig: Take by mouth   Omega-3 1000 MG CAPS   Yes No   Sig: Take by mouth   amLODIPine (NORVASC) 5 mg tablet   Yes Yes   Sig: Take by mouth   atorvastatin (LIPITOR) 20 mg tablet   Yes Yes   Sig: Take by mouth   dronedarone (MULTAQ) 400 mg tablet   Yes Yes   Sig: Take by mouth   fluticasone (FLONASE) 50 mcg/act nasal spray   Yes Yes   Sig: into each nostril   folic acid (FOLVITE) 1 mg tablet   Yes Yes   Sig: Take by mouth   traMADol-acetaminophen (ULTRACET) 37 5-325 mg per tablet   Yes Yes   Sig: Take 1 tablet by mouth every 6 (six) hours as needed for moderate pain      Facility-Administered Medications: None       Past Medical History:   Diagnosis Date    Atrial fibrillation ([address] )     CAD (coronary artery disease)     Hypertension        Past Surgical History:   Procedure Laterality Date    AORTIC VALVE REPLACEMENT      CARDIAC SURGERY History reviewed  No pertinent family history  I have reviewed and agree with the history as documented  Social History   Substance Use Topics    Smoking status: Former Smoker    Smokeless tobacco: Former User    Alcohol use Yes      Comment: social        Review of Systems   Constitutional: Negative  HENT: Negative  Eyes: Negative  Respiratory: Negative  Cardiovascular: Positive for palpitations and leg swelling (  Chronic, mild bilaterally)  Negative for chest pain  Gastrointestinal: Negative  Endocrine: Negative  Genitourinary: Negative  Musculoskeletal: Negative  Skin: Negative  Allergic/Immunologic: Negative  Neurological: Negative  Hematological: Negative  Psychiatric/Behavioral: Negative  All other systems reviewed and are negative  Physical Exam  ED Triage Vitals [11/27/17 2344]   Temp Pulse Respirations Blood Pressure SpO2   -- (!) 49 14 (!) 183/78 100 %      Temp src Heart Rate Source Patient Position - Orthostatic VS BP Location FiO2 (%)   -- Monitor Lying Right arm --      Pain Score       No Pain           Orthostatic Vital Signs  Vitals:    11/27/17 2344 11/28/17 0015   BP: (!) 183/78 (!) 188/85   Pulse: (!) 49 (!) 50   Patient Position - Orthostatic VS: Lying Sitting       Physical Exam   Constitutional: She is oriented to person, place, and time  She appears well-developed and well-nourished  No distress  HENT:   Head: Normocephalic and atraumatic  Mouth/Throat: Oropharynx is clear and moist    Eyes: Conjunctivae and EOM are normal  Pupils are equal, round, and reactive to light  Neck: Normal range of motion  Neck supple  No JVD present  No tracheal deviation present  Cardiovascular: Normal rate  Exam reveals no friction rub  Murmur (  HolosystolicMurmur best heard at the base) heard  Extrasystoles frequently   Pulmonary/Chest: Effort normal and breath sounds normal  No stridor  Abdominal: Soft   Bowel sounds are normal  She exhibits no distension  There is no tenderness  Musculoskeletal: Normal range of motion  She exhibits no edema or tenderness  Neurological: She is alert and oriented to person, place, and time  She has normal reflexes  No cranial nerve deficit  Coordination normal    Skin: Skin is warm and dry  Capillary refill takes less than 2 seconds  No rash noted  She is not diaphoretic  Venous stasis skin changes   Psychiatric: She has a normal mood and affect  Nursing note and vitals reviewed  ED Medications  Medications - No data to display    Diagnostic Studies  Results Reviewed     Procedure Component Value Units Date/Time    POCT urinalysis dipstick [[id number]]  (Normal) Resulted:  11/28/17 0024    Lab Status:  Final result Updated:  11/28/17 0024     Color, UA yellow     Clarity, UA clear     EXT Glucose, UA negative     EXT Bilirubin, UA (Ref: Negative) negative     EXT Ketones, UA (Ref: Negative) negative     EXT Spec Grav, UA 1 005     EXT Blood, UA (Ref: Negative) small     EXT pH, UA 6 0     EXT Protein, UA (Ref: Negative) negative     EXT Urobilinogen, UA (Ref: 0 2- 1 0) 0 2     EXT Leukocytes, UA (Ref: Negative) trace     EXT Nitrite, UA (Ref: Negative) negative    Troponin I [[id number]]  (Normal) Collected:  11/27/17 2341    Lab Status:  Final result Specimen:  Blood from Arm, Left Updated:  11/28/17 0012     Troponin I <0 02 ng/mL     Narrative:         [employer] Chemistry analyzer 99% cutoff is > 0 04 ng/mL in network labs    o cTnI 99% cutoff is useful only when applied to patients in the clinical setting of myocardial ischemia  o cTnI 99% cutoff should be interpreted in the context of clinical history, ECG findings and possibly cardiac imaging to establish correct diagnosis  o cTnI 99% cutoff may be suggestive but clearly not indicative of a coronary event without the clinical setting of myocardial ischemia      Protime-INR [[id number]]  (Normal) Collected:  11/27/17 2341    Lab Status:  Final result Specimen:  Blood from Arm, Left Updated:  11/28/17 0004     Protime 12 5 seconds      INR 0 95    APTT [[id number]]  (Normal) Collected:  11/27/17 2341    Lab Status:  Final result Specimen:  Blood from Arm, Left Updated:  11/28/17 0004     PTT 26 seconds     Narrative: Therapeutic Heparin Range = 60-90 seconds    Basic metabolic panel [[id number]]  (Abnormal) Collected:  11/27/17 2341    Lab Status:  Final result Specimen:  Blood from Arm, Left Updated:  11/28/17 0004     Sodium 141 mmol/L      Potassium 3 7 mmol/L      Chloride 104 mmol/L      CO2 26 mmol/L      Anion Gap 11 mmol/L      BUN 26 (H) mg/dL      Creatinine 1 16 mg/dL      Glucose 103 mg/dL      Calcium 8 7 mg/dL      eGFR 42 ml/min/1 73sq m     Narrative:         National Kidney Disease Education Program recommendations are as follows:  GFR calculation is accurate only with a steady state creatinine  Chronic Kidney disease less than 60 ml/min/1 73 sq  meters  Kidney failure less than 15 ml/min/1 73 sq  meters      CBC and differential [[id number]]  (Abnormal) Collected:  11/27/17 2341    Lab Status:  Final result Specimen:  Blood from Arm, Left Updated:  11/27/17 2349     WBC 7 84 Thousand/uL      RBC 4 33 Million/uL      Hemoglobin 13 4 g/dL      Hematocrit 40 1 %      MCV 93 fL      MCH 30 9 pg      MCHC 33 4 g/dL      RDW 13 4 %      MPV 10 9 fL      Platelets 612 Thousands/uL      Neutrophils Relative 45 %      Lymphocytes Relative 40 %      Monocytes Relative 13 (H) %      Eosinophils Relative 2 %      Basophils Relative 0 %      Neutrophils Absolute 3 53 Thousands/µL      Lymphocytes Absolute 3 17 Thousands/µL      Monocytes Absolute 0 98 Thousand/µL      Eosinophils Absolute 0 13 Thousand/µL      Basophils Absolute 0 03 Thousands/µL                  XR chest 1 view portable   ED Interpretation by Bill Stanton DO (11/28 0059)   No active disease                 Procedures  ECG 12 Lead Documentation  Date/Time: 11/27/2017 11:35 PM  Performed by: Faiza Hagen  Authorized by: Faiza Hagen     ECG reviewed by me, the ED Provider: yes    Patient location:  ED  Previous ECG:     Previous ECG:  Compared to current    Comparison ECG info:   compared to May 2, 2015, atrial fibrillation has replaced sinus rhythm  Similarity:  Changes noted  Interpretation:     Interpretation comment:  Atrial fibrillation with knee formed PVCs   Or Isidor Galea conducted complexes  Nonspecific ST and T abnormalities  Phone Contacts  ED Phone Contact    ED Course  ED Course as of Nov 28 0102 Tue Nov 28, 2017   0100   Patient states she feels much better and wants to go home  She is hemodynamically stable  Sinus bradycardia at the present time  I reviewed results with patient and her daughters and gave instructions for follow up with her cardiologist   She was instructed to return as needed  MDM  Number of Diagnoses or Management Options  Atrial fibrillation with controlled ventricular response ([address] ): new and requires workup     Amount and/or Complexity of Data Reviewed  Clinical lab tests: ordered and reviewed  Tests in the radiology section of CPT®: ordered and reviewed  Independent visualization of images, tracings, or specimens: yes      CritCare Time    Disposition  Final diagnoses:   Atrial fibrillation with controlled ventricular response ([address] )     Time reflects when diagnosis was documented in both MDM as applicable and the Disposition within this note     Time User Action Codes Description Comment    11/28/2017  1:01 AM Lucien Molina Add [[id number]] Atrial fibrillation with controlled ventricular response [facility])       ED Disposition     ED Disposition Condition Comment    Discharge  Immanuel Goldeno discharge to home/self care      Condition at discharge: Stable        Follow-up Information     Follow up With Specialties Details Why Sohail Bass 89, DO Internal Medicine Call today  discuss recent symptoms  [place]  [address] [facility] [address]       your cardiologist  Call today  to follow up on your recent symptoms         Patient's Medications   Discharge Prescriptions    No medications on file     No discharge procedures on file      ED Provider  Electronically Signed by           Edgard Lin DO  11/28/17 8975

## 2017-11-30 ENCOUNTER — TRANSCRIBE ORDERS (OUTPATIENT)
Dept: ADMINISTRATIVE | Facility: HOSPITAL | Age: 82
End: 2017-11-30

## 2017-11-30 ENCOUNTER — ALLSCRIPTS OFFICE VISIT (OUTPATIENT)
Dept: OTHER | Facility: OTHER | Age: 82
End: 2017-11-30

## 2017-11-30 DIAGNOSIS — I48.0 PAROXYSMAL ATRIAL FIBRILLATION (HCC): Primary | ICD-10-CM

## 2017-11-30 DIAGNOSIS — I48.0 PAROXYSMAL ATRIAL FIBRILLATION (HCC): ICD-10-CM

## 2017-12-05 NOTE — PROGRESS NOTES
Assessment  Assessed    1  Paroxysmal atrial fibrillation (427 31) (I48 0)   2  Coronary artery disease (414 00) (I25 10)   3  CABG   4  Hyperlipidemia (272 4) (E78 5)   5  S/P AVR (aortic valve replacement) (V43 3) (Z95 2)   6  Essential hypertension (401 9) (I10)    Plan  Paroxysmal atrial fibrillation    · Amiodarone HCl - 200 MG Oral Tablet; TAKE 1 TABLET TWICE DAILY FOR 2 WEEK,  THEN DAILY THEREAFTER   Rx By: Elli Martinez; Dispense: 30 Days ; #:60 Tablet; Refill: 6; For: Paroxysmal atrial fibrillation; MIKE = N; [facility]; Last Updated By: System, SureScript[employer]; 11/30/2017 10:32:46 AM   · Eliquis 5 MG Oral Tablet; Take 1 tablet twice daily   Rx By: Elli Martinez; Dispense: 30 Days ; #:60 Tablet; Refill: 6; For: Paroxysmal atrial fibrillation; MIKE = N; [facility]; Last Updated By: System, SureScripts; 11/30/2017 10:32:47 AM   · Follow-up visit in 2 months Evaluation and Treatment  Follow-up  Status: Hold For -  Scheduling  Requested for: 44QIY0328   Ordered; For: Paroxysmal atrial fibrillation; Ordered By: Elli Martinez Performed:  Due: 52SCR4025   · Take your blood pressure twice a day  Record the numbers and bring them with you to  your appointments ; Status:Complete;   Done: 58TOA7396   Ordered; For:Paroxysmal atrial fibrillation; Ordered By:Juan Sharif;   · ECHO COMPLETE WITH CONTRAST IF INDICATED; Status:Need Information - Financial  Authorization; Requested for:[id number] 08:00AM;    Perform:[facility]; BIH:[id number]; Last Updated Maurice Jang; 11/30/2017 10:43:29 AM;Ordered; For:Paroxysmal atrial fibrillation; Ordered By:Juan Sharif;   · HOLTER MONITOR - 24 HOUR; Status:Active; Requested for:26Tnk1478 09:00AM;    Perform:[facility]; FUI:[id number]; Last Updated Maurice Jang; 11/30/2017 10:43:55 AM;Ordered; For:Paroxysmal atrial fibrillation; Ordered By:Florina Sharif;   Unspecified atrial fibrillation    · Multaq 400 MG Oral Tablet   Rx By: Ari Zhou; Dispense: 6 Days ; #:12 Tablet; Refill: 0; For: Unspecified atrial fibrillation; MIKE = Y; Dispense Sample; Last Updated By: Catarina Montero; 11/30/2017 10:31:49 AM   · EKG/ECG- POC; Status:Complete;   Done: 53KZP7862 09:48AM   Perform: In Office; Last Updated Leonila Mack; 11/30/2017 9:48:28 AM;Ordered; For:Unspecified atrial fibrillation; Ordered By:Mabel Sharif;    Discussion/Summary  Cardiology Discussion Summary Free Text Note Form [facility]:     1  PAF - [place] has been getting symptoms of her atrial fibrillation  She went to the ER earlier this week, although ECG showed sinus rhythm with frequent ectopy  Also, her insurance is no longer going to be covering [email or web address]  Given the fact that she has had an acceleration of symptoms and this coverage problem, switching to amiodarone would be a good option  I would have her hold Multaq for about 3 days, and then starting amiodarone 200 mg twice daily for about 2 weeks  Then she will go to daily  I am going to have her wear a Holter monitor during this transition  We also discussed anticoagulation  She stop Coumadin years ago voluntarily  They were interested in 1 of the newer oral anticoagulants  We are going to give Eliquis a try, we will also look at the coverage issues for this  She will be back to see Dr Anthony Perez in about 2 months  2  CAD - She has a remote history of CABG in 2005  She is without symptoms of angina  She will remain on all other medical therapy prescribed  3  Bioprosthetic AVR - This was performed in 2012  It had been functioning normally on prior echocardiograms  We ordered an updated echocardiogram  She should take antibiotic prophylaxis for any dental procedures  She will be back to see us in 2 months  Chief Complaint  Chief Complaint Free Text Note Form: Follow up visit with EKG/AFIB  Patient being seen for episodes of AFIB   Patient has had three episodes within a week  Patient was seen in the ER on Monday 11/27  History of Present Illness  Cardiology HPI Free Text Note Form [facility]: Mrs Derrell Caldera comes in for an acute visit secondary to symptoms of palpitations and feeling as if she is going back into atrial fibrillation  She follows with Dr Poonam Galdamez of our practice given her history of CAD and aortic valve disease  She is status post CABG in 2005 after being found to have multivessel disease, but then had progression of aortic valve disease and had a bioprosthetic aortic valve replaced in 2012  She has a history of paroxysmal atrial fibrillation and is currently on Multaq anti rhythmic therapy  She has had an intolerance to beta-blocker in the past likely due to bradycardia  Also, she was once on Coumadin but she tells me she requested coming off of this and has been on aspirin since  Her echocardiograms through the years of show normal LV systolic function and a normally functioning bioprosthetic aortic valve replacement  Denise Mayo has noticed acceleration of palpitations, particularly in the last week  This did lead her to an emergency room visit earlier this week  EKG showed sinus rhythm with PACs and PVCs  She had worn Holter monitors in the past that had shown atrial fibrillation  She and her daughter also tells me that her insurance is no longer going to be covering Multaq as of the 1st of the year  There were also interested and re-discussing anticoagulation  She denies any chest pain or any symptoms of angina  She denies any worsening shortness of breath or signs/symptoms of congestive heart failure  No lightheadedness or syncope  During her palpitations she will feel fatigued  Atrial Fibrillation (Follow-Up): The treatment strategy for this patient is rhythm control  She states her atrial fibrillation has been stable since the last visit  She has no comorbid illnesses  She has no significant interval events     Symptoms: worsened palpitations, denies chest pain, denies exercise intolerance, denies dyspnea on exertion and denies dizziness  Associated symptoms include no syncope, no focal neurologic deficit, no tendency for easy bleeding and no tendency for easy bruising  Medications: the patient is adherent with her medication regimen  She denies medication side effects  Coronary Artery Disease (Follow-Up): The patient states she has been stable with her coronary artery disease symptoms since the last visit  Comorbid Illnesses: hypertension  She has no known CAD complications  She has no significant interval events  Symptoms: The patient is currently asymptomatic  Associated symptoms include palpitations, but no syncope, no PND, no orthopnea and no edema  Medications: the patient is adherent with her medication regimen  She denies medication side effects  Hypertension (Follow-Up): The patient presents for follow-up of essential hypertension  She has no comorbid illnesses  She has no significant interval events  Symptoms: The patient is currently asymptomatic  Associated symptoms include no headache, no focal neurologic deficits and no memory loss  Medications: the patient is adherent with her medication regimen  She denies medication side effects  The patient is due for an ECG  Review of Systems  Cardiology Female ROS:     Cardiac: as noted in HPI  Skin: No complaints of nonhealing sores or skin rash  Genitourinary: No complaints of recurrent urinary tract infections, frequent urination at night, difficult urination, blood in urine, kidney stones, loss of bladder control, kidney problems, denies any birth control or hormone replacement, is not post menopausal, not currently pregnant  Psychological: No complaints of feeling depressed, anxiety, panic attacks, or difficulty concentrating  General: lack of energy/fatigue  Respiratory: No complaints of shortness of breath, cough with sputum, or wheezing     HEENT: No complaints of serious problems, hearing problems, nose problems, throat problems, or snoring  Gastrointestinal: No complaints of liver problems, nausea, vomiting, heartburn, constipation, bloody stools, diarrhea, problems swallowing, adbominal pain, or rectal bleeding  Hematologic: No complaints of bleeding disorders, anemia, blood clots, or excessive brusing  Neurological: No complaints of numbness, tingling, dizziness, weakness, seizures, headaches, syncope or fainting, AM fatigue, daytime sleepiness, no witnessed apnea episodes  Musculoskeletal: No complaints of arthritis, back pain, or painfull swelling  ROS Reviewed:   ROS reviewed  Active Problems  Problems    1  Active advance directive (V49 89) (Z78 9)   2  Aortic valve disorder (424 1) (I35 9)   3  Arteriosclerosis of carotid artery (433 10) (I65 29)   4  Arteriosclerosis of coronary artery (414 00) (I25 10)   5  Bilateral edema of lower extremity (782 3) (R60 0)   6  Bilateral impacted cerumen (380 4) (H61 23)   7  Breast cancer screening (V76 10) (Z12 39)   8  Breast tenderness in female (611 71) (N64 4)   9  CABG   10  Cataract (366 9) (H26 9)   11  Cervical spine arthritis (721 0) (M46 92)   12  Cholelithiasis without cholecystitis (574 20) (K80 20)   13  Chronic cervical radiculopathy (723 4) (M54 12)   14  Coronary artery disease (414 00) (I25 10)   15  Cystitis, chronic (595 2) (N30 20)   16  Encounter for screening mammogram for breast cancer (V76 12) (Z12 31)   17  Essential hypertension (401 9) (I10)   18  Hyperlipidemia (272 4) (E78 5)   19  Leg wound, right (891 0) (S81 801A)   20  Denied: History of Mental health disorder   21  Mitral insufficiency and aortic stenosis (396 2) (I08 0)   22  Nausea (787 02) (R11 0)   23  Need for influenza vaccination (V04 81) (Z23)   24  Neural foraminal stenosis of cervical spine (723 0) (M99 81)   25  Occipital neuralgia of right side (723 8) (M54 81)   26   Open wound of hand (882 0) (S61 409A)   27  Open wound of lower leg, right, initial encounter (891 0) (S81 801A)   28  Open wound of lower leg, right, subsequent encounter (V58 89,891 0) (S81 801D)   29  Osteoarthritis (715 90) (M19 90)   30  Osteoporosis screening (V82 81) (Z13 820)   31  Pain in wrist, unspecified laterality (719 43) (M25 539)   32  Pain syndrome, chronic (338 4) (G89 4)   33  Pelvic pain (R10 2)   34  Peripheral neuropathy (356 9) (G62 9)   35  Right cervical radiculopathy (723 4) (M54 12)   36  S/P AVR (aortic valve replacement) (V43 3) (Z95 2)   37  Screening for genitourinary condition (V81 6) (Z13 89)   38  Spondylolisthesis, acquired (738 4) (M43 10)   39  Denied: History of Substance abuse   40  Thrombocytopenia (287 5) (D69 6)   41  Tracheobronchitis (490) (J40)   42  Traumatic open wound of left lower leg (891 0) (S81 802A)   43  Unspecified atrial fibrillation (427 31) (I48 91)   44  Upper respiratory infection (465 9) (J06 9)   45  Urinary tract infection (599 0) (N39 0)   46  Wound of left lower extremity, initial encounter (894 0) (S81 802A)   47  Wound of left lower extremity, subsequent encounter (V58 89,894 0) (X58 150E)    Past Medical History  Problems    1  History of Abdominal pain, epigastric (789 06) (R10 13)   2  History of Acute myocardial infarction (410 90) (I21 9)   3  History of Asymptomatic postmenopausal status (V49 81) (Z78 0)   4  History of CAD (coronary artery disease) (414 00) (I25 10)   5  History of Cellulitis and abscess (682 9) (L03 90,L02 91)   6  History of Cholecystitis (575 10) (K81 9)   7  History of Closed Colles' Fracture Of The Left Wrist (813 41)   8  History of Closed Fracture Of The Radius (813 81)   9  History of Dyspnea (786 09) (R06 00)   10  History of Hair loss disorder (704 00) (L65 9)   11  History of Hematuria (599 70) (R31 9)   12  History of being hospitalized (V13 9) (Z92 89)   13  History of chest pain (V13 89) (Z87 898)   14  History of hypercholesterolemia (V12 29) (Z86 39)   15   History of hypertension (V12 59) (Z86 79)   16  History of sick sinus syndrome (V12 59) (Z86 79)   17  History of urinary tract infection (V13 02) (Z87 440)   18  History of Incomplete bladder emptying (788 21) (R33 9)   19  History of Leg wound, left (891 0) (S81 802A)   20  History of Malaise and fatigue (780 79) (R53 81,R53 83)   21  Denied: History of Mental health disorder   22  Denied: History of Substance abuse   23  History of Urinary frequency (788 41) (R35 0)   24  History of UTI (urinary tract infection) (599 0) (N39 0)  Active Problems And Past Medical History Reviewed: The active problems and past medical history were reviewed and updated today  Surgical History  Problems    1  History of CABG   2  History of Cataract Extraction   3  History of Heart Valve Replacement  Surgical History Reviewed: The surgical history was reviewed and updated today  Family History  Mother    1  Family history of    2  Family history of Diabetes Mellitus (V18 0)   3  Family history of Heart disease   4  Family history of Hypertension (V17 49)   5  No family history of mental disorder   6  Denied: Family history of Substance abuse in family  Father    9  Family history of    6  No family history of mental disorder   9  Family history of Prostate cancer   10  Denied: Family history of Substance abuse in family  Brother    6  Family history of    15  Family history of Dementia  Maternal Grandmother    15  Family history of Heart disease   14  Family history of Hypertension (V17 49)  Maternal Grandfather    15  Family history of Heart disease  Family History    12  Family history of hypertension (V17 49) (Z82 49)   17  Family history of Osteoporosis (V17 81)  Family History Reviewed: The family history was reviewed and updated today         Social History  Problems    · Active advance directive (V49 89) (Z78 9)   · Being A Social Drinker   · Caffeine use (V49 89) (F15 90)   · Cultural background · Denied: History of Drug Use   · Exercise habits   · Former smoker (V15 82) (Z24 816)   · Good dental hygiene   · Living Situation: Supportive and safe   · Never a smoker   · Primary spoken language English   · Racial background   · Retired   · Denied: History of Unemployed, not looking for work   · Denied: History of Uses  drugs   ·   Social History Reviewed: The social history was reviewed and updated today  The social history was reviewed and is unchanged  Current Meds   1  AmLODIPine Besylate 5 MG Oral Tablet; TAKE ONE TABLET BY MOUTH EVERY DAY AS   DIRECTED; Therapy: 96HHS2916 to ([phone])  Requested for: 30CFS7962; Last   Rx:27Xgr2992 Ordered   2  Aspirin EC Low Dose 81 MG Oral Tablet Delayed Release; TAKE 1 TABLET DAILY; Therapy: (Recorded:03Amv1420) to Recorded   3  Atorvastatin Calcium 20 MG Oral Tablet; take one tablet by mouth every day; Therapy: 74EQG9031 to ([facility])  Requested for: 33WKJ0177; Last   Rx:36Mqz8372 Ordered   4  CoQ10 CAPS; TAKE 1 CAPSULE DAILY WITH A MEAL; Therapy: (Recorded:72Dpx4452) to Recorded   5  Flonase Allergy Relief 50 MCG/ACT Nasal Suspension; 1 spray each nostril bid x 7 days; Therapy: 35Kdh1154 to (Evaluate:83Csb5064)  Requested for: 89Qae4936; Last   Rx:12Ssv1923 Ordered   6  Folic Acid 1 MG Oral Tablet; TAKE 1 TABLET DAILY AS DIRECTED; Therapy: 67AYA9998 to (Evaluate:01Jan2017)  Requested for: 40ZEU0302; Last   Rx:01Okn2651 Ordered   7  Multaq 400 MG Oral Tablet; TAKE ONE (1) TABLET(S) TWICE DAILY WITH MORNING AND   EVENING MEAL; Therapy: 08LUZ6762 to (Jaziel Marcus)  Requested for: 52EFH5666; Last   Rx:94Pbo6369 Ordered   8  Ocuvite TABS; TAKE 1 TABLET DAILY; Therapy: (Recorded:48Nvo3549) to Recorded   9  Omega 3 CAPS; 1000 mg CAPSULE---1 DAILY; Therapy: (Recorded:62Ntw6221) to Recorded   10  Tramadol-Acetaminophen 37 5-325 MG Oral Tablet; TAKE 1 TABLET 4 times daily PRN    neck pain;     Therapy: 83ASI0503 to (Evaluate:12Guo9991); Last Rx:30Oct2017 Ordered  Medication List Reviewed: The medication list was reviewed and updated today  Allergies  Medication    1  Heparin  Non-Medication    2  No Known Environmental Allergies   3  No Known Food Allergies    Vitals  Vital Signs    Recorded: 33MTE0168 09:38AM   Heart Rate 52   Systolic 918   Diastolic 70   Height 5 ft 3 in   Weight 122 lb    BMI Calculated 21 61   BSA Calculated 1 57     Physical Exam    Constitutional   General appearance: No acute distress, well appearing and well nourished  Eyes   Conjunctiva and Sclera examination: Conjunctiva pink, sclera anicteric  Ears, Nose, Mouth, and Throat - Oropharynx: Clear, nares are clear, mucous membranes are moist    Neck   Neck and thyroid: Normal, supple, trachea midline, no thyromegaly  Pulmonary   Respiratory effort: No increased work of breathing or signs of respiratory distress  Auscultation of lungs: Clear to auscultation, no rales, no rhonchi, no wheezing, good air movement  Cardiovascular   Auscultation of heart: Abnormal   The heart rate was bradycardic  The rhythm was regular  Heart sounds: normal S1, normal S2 and no gallop heard  A grade 3 systolic murmur was heard at the RUSB  Carotid pulses: Normal, 2+ bilaterally  Peripheral vascular exam: Normal pulses throughout, no tenderness, erythema or swelling  Pedal pulses: Normal, 2+ bilaterally  Examination of extremities for edema and/or varicosities: Normal     Abdomen   Abdomen: Non-tender and no distention  Liver and spleen: No hepatomegaly or splenomegaly  Musculoskeletal Gait and station: Normal gait  Digits and nails: Normal without clubbing or cyanosis  Inspection/palpation of joints, bones, and muscles: Normal, ROM normal     Skin - Skin and subcutaneous tissue: Normal without rashes or lesions  Skin is warm and well perfused, normal turgor  Neurologic - Cranial nerves: II - XII intact   Speech: Normal     Psychiatric - Orientation to person, place, and time: Normal  Mood and affect: Normal       Results/Data  ECG Report:   Rhythm and rate: sinus bradycardia  IL Interval: first degree heart block  T waves:   there are nonspecific ST-T wave changes  Comparison to prior ECGs: no interval change        Signatures   Electronically signed by : RYAN Lisa ; Nov 30 2017 10:48AM EST                       (Author)

## 2017-12-08 ENCOUNTER — HOSPITAL ENCOUNTER (OUTPATIENT)
Dept: NON INVASIVE DIAGNOSTICS | Facility: CLINIC | Age: 82
Discharge: HOME | End: 2017-12-08
Payer: MEDICARE

## 2017-12-08 ENCOUNTER — GENERIC CONVERSION - ENCOUNTER (OUTPATIENT)
Dept: CARDIOLOGY CLINIC | Facility: CLINIC | Age: 82
End: 2017-12-08

## 2017-12-08 ENCOUNTER — GENERIC CONVERSION - ENCOUNTER (OUTPATIENT)
Dept: OTHER | Facility: OTHER | Age: 82
End: 2017-12-08

## 2017-12-08 DIAGNOSIS — I48.0 PAROXYSMAL ATRIAL FIBRILLATION (HCC): ICD-10-CM

## 2017-12-08 DIAGNOSIS — Z95.2 PRESENCE OF PROSTHETIC HEART VALVE: ICD-10-CM

## 2017-12-08 DIAGNOSIS — I25.10 ATHEROSCLEROTIC HEART DISEASE OF NATIVE CORONARY ARTERY WITHOUT ANGINA PECTORIS: ICD-10-CM

## 2017-12-08 DIAGNOSIS — E78.5 HYPERLIPIDEMIA: ICD-10-CM

## 2017-12-08 DIAGNOSIS — I10 ESSENTIAL (PRIMARY) HYPERTENSION: ICD-10-CM

## 2017-12-08 PROCEDURE — 93226 XTRNL ECG REC<48 HR SCAN A/R: CPT

## 2017-12-08 PROCEDURE — 93225 XTRNL ECG REC<48 HRS REC: CPT

## 2017-12-08 PROCEDURE — 93306 TTE W/DOPPLER COMPLETE: CPT

## 2017-12-28 ENCOUNTER — APPOINTMENT (OUTPATIENT)
Dept: WOUND CARE | Facility: HOSPITAL | Age: 82
End: 2017-12-28
Attending: PODIATRIST
Payer: MEDICARE

## 2017-12-28 PROCEDURE — 99213 OFFICE O/P EST LOW 20 MIN: CPT | Performed by: PODIATRIST

## 2017-12-28 PROCEDURE — 11045 DBRDMT SUBQ TISS EACH ADDL: CPT | Performed by: PODIATRIST

## 2017-12-28 PROCEDURE — 11042 DBRDMT SUBQ TIS 1ST 20SQCM/<: CPT | Performed by: PODIATRIST

## 2017-12-30 ENCOUNTER — HOSPITAL ENCOUNTER (EMERGENCY)
Facility: HOSPITAL | Age: 82
Discharge: HOME | End: 2017-12-30
Payer: MEDICARE

## 2017-12-30 VITALS
OXYGEN SATURATION: 97 % | RESPIRATION RATE: 16 BRPM | HEART RATE: 57 BPM | DIASTOLIC BLOOD PRESSURE: 101 MMHG | SYSTOLIC BLOOD PRESSURE: 212 MMHG | BODY MASS INDEX: 18.48 KG/M2 | TEMPERATURE: 97.7 F | HEIGHT: 66 IN | WEIGHT: 115 LBS

## 2017-12-30 DIAGNOSIS — S81.801A WOUND OF RIGHT LOWER EXTREMITY, INITIAL ENCOUNTER: Primary | ICD-10-CM

## 2017-12-30 PROCEDURE — 99282 EMERGENCY DEPT VISIT SF MDM: CPT

## 2017-12-30 RX ORDER — AMIODARONE HYDROCHLORIDE 200 MG/1
200 TABLET ORAL DAILY
COMMUNITY
End: 2018-03-13 | Stop reason: SDUPTHER

## 2017-12-31 NOTE — DISCHARGE INSTRUCTIONS
Skin Tear   WHAT YOU NEED TO KNOW:   A skin tear occurs when the layers of weakened skin split open from an injury  , elderly, and chronically or critically ill people are at higher risk for skin tears  Long-term use of steroids can also increase the risk  It is important to treat and prevent skin tears to prevent infection  DISCHARGE INSTRUCTIONS:   Medicines:   · Medicines  may be given to reduce your pain or to treat or prevent an infection  Do not wait until the pain is severe before you ask for more medicine  · Take your medicine as directed  Contact your healthcare provider if you think your medicine is not helping or if you have side effects  Tell him of her if you are allergic to any medicine  Keep a list of the medicines, vitamins, and herbs you take  Include the amounts, and when and why you take them  Bring the list or the pill bottles to follow-up visits  Carry your medicine list with you in case of an emergency  Follow up with your healthcare provider as directed:  Write down your questions so you remember to ask them during your visits  Wound care: You may be referred to a wound specialist who will teach you how to clean your wound properly  Ask for more information about wound care  Prevent another skin tear:   · Clean, moisturize, and protect your skin  Baths, hot showers, and soap can dry your skin and increase your risk for skin tears  Take tepid showers, use mild soap as directed, and gently pat your skin dry  Use lotion to keep your skin moist after you shower  Wear long sleeves, pants, and protective footwear  · Move carefully  Ask for help if you cannot lift yourself well enough to avoid dragging your skin when you move  · Keep your home safe  Cover sharp corners, keep your pathways clear, and turn on lights so you can see clearly  Ask for more information if you have questions about home safety  · Drink liquids as directed    Ask your healthcare provider how much liquid to drink each day and which liquids are best for you  Liquids will help keep your skin moist and protected from future skin tears  · Eat high-protein foods  Examples are lean meats, fish, low-fat dairy products, and beans  A dietitian may help you create high-protein meal plans to help with wound healing  Contact your healthcare provider if:   · You have redness, swelling, pus, or a bad odor coming from your wound  · Blood soaks through your bandage  · You have increased pain, even after you take pain medicine  · Your wound tears open again  Return to the emergency department if:   · You have a fever  © 2017 [employer]  Information is for End User's use only and may not be sold, redistributed or otherwise used for commercial purposes  All illustrations and images included in CareNotes® are the copyrighted property of [employer]  or Nilesh Loomis  The above information is an  only  It is not intended as medical advice for individual conditions or treatments  Talk to your doctor, nurse or pharmacist before following any medical regimen to see if it is safe and effective for you

## 2017-12-31 NOTE — ED PROVIDER NOTES
History  Chief Complaint   Patient presents with    Wound Check     PT to ED with open wound L calf, dermagran "dissolved into leg" and bleeding  80year old female with a hx of multiple skin tears bumped her shin on chaka tearing skin  Went to wound care they had her using Dermagran  Today she felt the [place] Seats would not come off and she was worried  Wound appears pink, beginning to granulate, appropriate healing stage  Prior to Admission Medications   Prescriptions Last Dose Informant Patient Reported? Taking? Coenzyme Q10 (COQ10) 100 MG CAPS   Yes No   Sig: Take by mouth   Multiple Vitamins-Minerals (OCUVITE EXTRA PO)   Yes No   Sig: Take by mouth   Omega-3 1000 MG CAPS   Yes No   Sig: Take by mouth   amLODIPine (NORVASC) 5 mg tablet   Yes No   Sig: Take by mouth   amiodarone 200 mg tablet   Yes Yes   Sig: Take 200 mg by mouth daily   apixaban (ELIQUIS) 5 mg   Yes Yes   Sig: Take 5 mg by mouth 2 (two) times a day   atorvastatin (LIPITOR) 20 mg tablet   Yes No   Sig: Take by mouth   dronedarone (MULTAQ) 400 mg tablet   Yes No   Sig: Take by mouth   fluticasone (FLONASE) 50 mcg/act nasal spray   Yes No   Sig: into each nostril   folic acid (FOLVITE) 1 mg tablet   Yes No   Sig: Take 800 mcg by mouth     traMADol-acetaminophen (ULTRACET) 37 5-325 mg per tablet   Yes No   Sig: Take 1 tablet by mouth every 6 (six) hours as needed for moderate pain      Facility-Administered Medications: None       Past Medical History:   Diagnosis Date    Atrial fibrillation (HCC)     CAD (coronary artery disease)     Hypertension        Past Surgical History:   Procedure Laterality Date    AORTIC VALVE REPLACEMENT      CARDIAC SURGERY         History reviewed  No pertinent family history  I have reviewed and agree with the history as documented      Social History   Substance Use Topics    Smoking status: Former Smoker    Smokeless tobacco: Former User    Alcohol use Yes      Comment: social Review of Systems   All other systems reviewed and are negative  Physical Exam  ED Triage Vitals [12/30/17 1931]   Temperature Pulse Respirations Blood Pressure SpO2   97 7 °F (36 5 °C) 57 16 (!) 212/101 97 %      Temp Source Heart Rate Source Patient Position - Orthostatic VS BP Location FiO2 (%)   Tympanic Monitor Sitting Right arm --      Pain Score       --           Orthostatic Vital Signs  Vitals:    12/30/17 1931   BP: (!) 212/101   Pulse: 57   Patient Position - Orthostatic VS: Sitting       Physical Exam   Constitutional: She is oriented to person, place, and time  She appears well-developed and well-nourished  HENT:   Head: Normocephalic and atraumatic  Eyes: EOM are normal  Pupils are equal, round, and reactive to light  No scleral icterus  Neck: Normal range of motion  Neck supple  No thyromegaly present  Cardiovascular: Normal rate and regular rhythm  Pulmonary/Chest: Effort normal    Abdominal: Soft  Musculoskeletal: Normal range of motion  She exhibits no edema or tenderness  Legs:  Neurological: She is alert and oriented to person, place, and time  Skin: Skin is warm and dry  Nursing note and vitals reviewed  ED Medications  Medications - No data to display    Diagnostic Studies  Results Reviewed     None                 No orders to display              Procedures  Procedures       Phone Contacts  ED Phone Contact    ED Course  ED Course                                MDM    CritCare Time    Disposition  Final diagnoses:   Wound of right lower extremity, initial encounter     Time reflects when diagnosis was documented in both MDM as applicable and the Disposition within this note     Time User Action Codes Description Comment    12/30/2017  7:52 PM Ilya Young Add [M00 337T] Wound of right lower extremity, initial encounter       ED Disposition     ED Disposition Condition Comment    Discharge  Valeriano Show discharge to home/self care      Condition at discharge: Good        Follow-up Information     Follow up With Specialties Details Why Contact Info Additional Information    [address] [address]  [address] [facility], [place] [address], [place], [place], [facility]        Patient's Medications   Discharge Prescriptions    No medications on file     No discharge procedures on file      ED Provider  Electronically Signed by           XAVIER Cardenas  12/30/17 [address] [facility],[address] Floor, [address] [place] [address]  12/30/17 [id number]

## 2018-01-04 ENCOUNTER — APPOINTMENT (OUTPATIENT)
Dept: WOUND CARE | Facility: HOSPITAL | Age: 83
End: 2018-01-04
Attending: PODIATRIST
Payer: MEDICARE

## 2018-01-04 PROCEDURE — 15002 WOUND PREP TRK/ARM/LEG: CPT | Performed by: PODIATRIST

## 2018-01-10 ENCOUNTER — APPOINTMENT (OUTPATIENT)
Dept: WOUND CARE | Facility: HOSPITAL | Age: 83
End: 2018-01-10
Attending: PODIATRIST
Payer: MEDICARE

## 2018-01-10 PROCEDURE — 15110 EPIDRM AGRFT T/A/L 1ST 100: CPT | Performed by: PODIATRIST

## 2018-01-11 NOTE — PROGRESS NOTES
History of Present Illness  ED Outreach:   ED Visit Information  ED visit date: 11/27/2017  Diagnosis description: Rapid Heart Rate   Facility name: [address] ED  Discharge status: Home  ED severity: 2  In network  Outreach Information  Outreach successful  Number of attempts - 1  Date finalized: 11/28/2017  Care Coordination  Pt Refused to Answer Questions  Emergent necessity warranted by diagnosis  [facility] PCP  Practice did not contact patient  No follow up appointment with PCP  Comments:   352pm 11/28/2017 Person identified herself as patient, then said is patient's daughter Bakari Fernandes  She declined setting up PCP FU  No cardio FU appt in chart, PT daughter stated she would decide where to set up FU appts tomorrow        Signatures   Electronically signed by : Karlos Forbes, ; Nov 28 2017  3:56PM EST                       (Author)    Electronically signed by : Karlos Forbes, ; Dec 20 2017  3:48PM EST                       (Author)

## 2018-01-12 NOTE — RESULT NOTES
Verified Results  MAMMO DIAGNOSTIC BILATERAL W CAD [id number] 08:46AM Itz Boyd     Test Name Result Flag Reference   MAMMO DIAGNOSTIC BILATERAL W CAD (Report)     Patient History:   Patient is postmenopausal    Family history of prostate cancer in father  Reason for exam: clinical finding  Mammo Diagnostic Bilateral W CAD: February 23, 2017 - Check In #:   [de-identified]   Bilateral MLO and CC view(s) were taken  ML and CV view(s) were    taken of the left breast      Technologist: YENNY Jacques (R)(M)   Prior study comparison: October 23, 2007, bilateral digital    screening mammo w/CAD, performed at [facility]  April 6, 2006, bilateral screening mammogram w/CAD,    performed at [facility]      There are scattered fibroglandular densities  These images were    obtained using digital technique and with the assistance of    Computer Aided Detection  There are no dominant masses, foci of    architectural distortion or suspicious clusters of calcification    to suggest malignancy  The visualized skin appears normal      Targeted ultrasound demonstrates no evidence of hypoechoic mass    or architectural distortion to suggest malignancy  US Breast Left Limited: February 23, 2017 - Check In #: [de-identified]   Standard views  Technologist: Thamas Bumpers, RDMS     ACR BI-RADSï¾® Assessments: BiRad:1 - Negative (Overall)   Diag Mammogram: BiRad:1 - negative  Left breast Left Brst US: Left breast is BiRad:1 - negative  Recommendation:   Routine screening mammogram of both breasts in 1 year     Analyzed by CAD     Transcription Location: [address]   Signing Station: [id number]     Risk Value(s):   Myriad Table: 1 5%   Signed by:   Reji Caceres MD   2/23/17

## 2018-01-13 VITALS
HEART RATE: 48 BPM | HEIGHT: 63 IN | WEIGHT: 121 LBS | DIASTOLIC BLOOD PRESSURE: 80 MMHG | SYSTOLIC BLOOD PRESSURE: 140 MMHG | BODY MASS INDEX: 21.44 KG/M2

## 2018-01-14 VITALS
WEIGHT: 119 LBS | SYSTOLIC BLOOD PRESSURE: 140 MMHG | DIASTOLIC BLOOD PRESSURE: 68 MMHG | HEART RATE: 60 BPM | BODY MASS INDEX: 21.08 KG/M2

## 2018-01-14 VITALS
HEART RATE: 52 BPM | DIASTOLIC BLOOD PRESSURE: 70 MMHG | HEIGHT: 63 IN | WEIGHT: 122 LBS | BODY MASS INDEX: 21.62 KG/M2 | SYSTOLIC BLOOD PRESSURE: 160 MMHG

## 2018-01-14 NOTE — RESULT NOTES
Verified Results  (1) CBC/PLT/DIFF [id number] 07:08AM Rosaura Pulido    Order Number: [id number]     Test Name Result Flag Reference   WBC COUNT 7 22 Thousand/uL  4 31-10 16   RBC COUNT 4 60 Million/uL  3 81-5 12   HEMOGLOBIN 13 3 g/dL  11 5-15 4   HEMATOCRIT 41 3 %  34 8-46  1   MCV 90 fL  82-98   MCH 28 9 pg  26 8-34 3   MCHC 32 2 g/dL  31 4-37 4   RDW 14 9 %  11 6-15 1   MPV 11 3 fL  8 9-12 7   PLATELET COUNT 501 Thousands/uL  149-390   NEUTROPHILS RELATIVE PERCENT 53 %  43-75   LYMPHOCYTES RELATIVE PERCENT 34 %  14-44   MONOCYTES RELATIVE PERCENT 12 %  4-12   EOSINOPHILS RELATIVE PERCENT 1 %  0-6   BASOPHILS RELATIVE PERCENT 0 %  0-1   NEUTROPHILS ABSOLUTE COUNT 3 85 Thousands/? ??L  1 85-7 62   LYMPHOCYTES ABSOLUTE COUNT 2 45 Thousands/? ??L  0 60-4 47   MONOCYTES ABSOLUTE COUNT 0 84 Thousand/? ??L  0 17-1 22   EOSINOPHILS ABSOLUTE COUNT 0 06 Thousand/? ??L  0 00-0 61   BASOPHILS ABSOLUTE COUNT 0 02 Thousands/? ??L  0 00-0 10   - Patient Instructions: This bloodwork is non-fasting  Please drink two glasses of water morning of bloodwork  - Patient Instructions: This bloodwork is non-fasting  Please drink two glasses of water morning of bloodwork  (1) COMPREHENSIVE METABOLIC PANEL [id number] 96:20LK [facility] Order Number: [id number]     Test Name Result Flag Reference   GLUCOSE,RANDM 87 mg/dL     If the patient is fasting, the ADA then defines impaired fasting glucose as > 100 mg/dL and diabetes as > or equal to 123 mg/dL     SODIUM 143 mmol/L  136-145   POTASSIUM 3 7 mmol/L  3 5-5 3   CHLORIDE 105 mmol/L  100-108   CARBON DIOXIDE 29 mmol/L  21-32   ANION GAP (CALC) 9 mmol/L  4-13   BLOOD UREA NITROGEN 19 mg/dL  5-25   CREATININE 0 88 mg/dL  0 60-1 30   Standardized to IDMS reference method   CALCIUM 8 9 mg/dL  8 3-10 1   BILI, TOTAL 0 50 mg/dL  0 20-1 00   ALK PHOSPHATAS 70 U/L     ALT (SGPT) 20 U/L  12-78   AST(SGOT) 16 U/L  5-45   ALBUMIN 3 5 g/dL  3 5-5 0   TOTAL PROTEIN 7 1 g/dL  6 4-8 2   eGFR Non-African American      >60 0 ml/min/1 73sq m   - Patient Instructions: This is a fasting blood test  Water, black tea or black coffee only after 9:00pm the night before test Drink 2 glasses of water the morning of test   National Kidney Disease Education Program recommendations are as follows:  GFR calculation is accurate only with a steady state creatinine  Chronic Kidney disease less than 60 ml/min/1 73 sq  meters  Kidney failure less than 15 ml/min/1 73 sq  meters  (1) LIPID PANEL FASTING W DIRECT LDL REFLEX [id number] 07:08AM Tamia Scott Order Number: [id number]     Test Name Result Flag Reference   CHOLESTEROL 162 mg/dL     LDL CHOLESTEROL CALCULATED 69 mg/dL  0-100   - Patient Instructions: This is a fasting blood test  Water, black tea or black coffee only after 9:00pm the night before test   Drink 2 glasses of water the morning of test     - Patient Instructions: This is a fasting blood test  Water, black tea or black coffee only after 9:00pm the night before test Drink 2 glasses of water the morning of test   Triglyceride:         Normal              <150 mg/dl       Borderline High    150-199 mg/dl       High               200-499 mg/dl       Very High          >499 mg/dl  Cholesterol:         Desirable        <200 mg/dl      Borderline High  200-239 mg/dl      High             >239 mg/dl  HDL Cholesterol:        High    >59 mg/dL      Low     <41 mg/dL  LDL Cholesterol:        Optimal          <100 mg/dl        Near Optimal     100-129 mg/dl        Above Optimal          Borderline High   130-159 mg/dl          High              160-189 mg/dl          Very High        >189 mg/dl  LDL CALCULATED:    This screening LDL is a calculated result  It does not have the accuracy of the Direct Measured LDL in the monitoring of patients with hyperlipidemia and/or statin therapy  Direct Measure LDL (SFW583) must be ordered separately in these patients     TRIGLYCERIDES 55 mg/dL  <=150   Specimen collection should occur prior to N-Acetylcysteine or Metamizole administration due to the potential for falsely depressed results  HDL,DIRECT 82 mg/dL H 40-60   Specimen collection should occur prior to Metamizole administration due to the potential for falsely depressed results

## 2018-01-15 ENCOUNTER — APPOINTMENT (OUTPATIENT)
Dept: WOUND CARE | Facility: HOSPITAL | Age: 83
End: 2018-01-15
Attending: PODIATRIST
Payer: MEDICARE

## 2018-01-15 PROCEDURE — 99211 OFF/OP EST MAY X REQ PHY/QHP: CPT | Performed by: PODIATRIST

## 2018-01-16 NOTE — RESULT NOTES
Verified Results  * XR CHEST PA & LATERAL [id number] 12:15PM [employer] Order Number: [id number]     Test Name Result Flag Reference   XR CHEST PA & LATERAL (Report)     CHEST - DUAL ENERGY     INDICATION: 1 week of chest pain  COMPARISON: 5/2/2015     VIEWS: PA (including soft tissue/bone algorithms) and lateral projections     IMAGES: Dual energy examination performed  4 images     FINDINGS: Please note this examination was performed on 2/20/2017 but is just now presented for evaluation  Tortuosity of the thoracic aorta  The heart is not enlarged  No effusions  No congestive failure  No pneumothorax  Stable surgical clips projecting over the right apex  Visualized osseous structures appear within normal limits for the patient's age  IMPRESSION:     No acute cardiopulmonary pathology         Workstation performed: [id number]     Signed by:   Merlin Cornwall, DO   2/24/17

## 2018-01-17 ENCOUNTER — APPOINTMENT (OUTPATIENT)
Dept: WOUND CARE | Facility: HOSPITAL | Age: 83
End: 2018-01-17
Attending: PODIATRIST
Payer: MEDICARE

## 2018-01-17 PROCEDURE — 99212 OFFICE O/P EST SF 10 MIN: CPT | Performed by: PODIATRIST

## 2018-01-22 ENCOUNTER — ALLSCRIPTS OFFICE VISIT (OUTPATIENT)
Dept: OTHER | Facility: OTHER | Age: 83
End: 2018-01-22

## 2018-01-22 VITALS
RESPIRATION RATE: 16 BRPM | HEIGHT: 63 IN | WEIGHT: 120 LBS | HEART RATE: 60 BPM | BODY MASS INDEX: 21.26 KG/M2 | DIASTOLIC BLOOD PRESSURE: 64 MMHG | SYSTOLIC BLOOD PRESSURE: 142 MMHG

## 2018-01-24 ENCOUNTER — APPOINTMENT (OUTPATIENT)
Dept: WOUND CARE | Facility: HOSPITAL | Age: 83
End: 2018-01-24
Attending: PODIATRIST
Payer: MEDICARE

## 2018-01-24 PROCEDURE — 99212 OFFICE O/P EST SF 10 MIN: CPT | Performed by: PODIATRIST

## 2018-01-24 NOTE — PROGRESS NOTES
Chief Complaint  Patient called this morning due to feeling lightheadedness  She took Blood pressure which was 170/88  Brought patient in for EKG and BP check  Patient also stated she was feeling like she was in AFIB but that it "corrected itself"  Active Problems    1  Active advance directive (V49 89) (Z78 9)   2  Aortic valve disorder (424 1) (I35 9)   3  Arteriosclerosis of carotid artery (433 10) (I65 29)   4  Arteriosclerosis of coronary artery (414 00) (I25 10)   5  Bilateral edema of lower extremity (782 3) (R60 0)   6  Bilateral impacted cerumen (380 4) (H61 23)   7  Breast cancer screening (V76 10) (Z12 39)   8  Breast tenderness in female (611 71) (N64 4)   9  CABG   10  Cataract (366 9) (H26 9)   11  Cervical spine arthritis (721 0) (M46 92)   12  Cholelithiasis without cholecystitis (574 20) (K80 20)   13  Chronic cervical radiculopathy (723 4) (M54 12)   14  Coronary artery disease (414 00) (I25 10)   15  Cystitis, chronic (595 2) (N30 20)   16  Encounter for screening mammogram for breast cancer (V76 12) (Z12 31)   17  Essential hypertension (401 9) (I10)   18  Hyperlipidemia (272 4) (E78 5)   19  Leg wound, right (891 0) (S81 801A)   20  Denied: History of Mental health disorder   21  Mitral insufficiency and aortic stenosis (396 2) (I08 0)   22  Nausea (787 02) (R11 0)   23  Need for influenza vaccination (V04 81) (Z23)   24  Neural foraminal stenosis of cervical spine (723 0) (M99 81)   25  Occipital neuralgia of right side (723 8) (M54 81)   26  Open wound of hand (882 0) (S61 409A)   27  Open wound of lower leg, right, initial encounter (891 0) (S81 801A)   28  Open wound of lower leg, right, subsequent encounter (V58 89,891 0) (S81 801D)   29  Osteoarthritis (715 90) (M19 90)   30  Osteoporosis screening (V82 81) (Z13 820)   31  Pain in wrist, unspecified laterality (719 43) (M25 539)   32  Pain syndrome, chronic (338 4) (G89 4)   33  Paroxysmal atrial fibrillation (427 31) (I48 0)   34  Pelvic pain (R10 2)   35  Peripheral neuropathy (356 9) (G62 9)   36  Right cervical radiculopathy (723 4) (M54 12)   37  S/P AVR (aortic valve replacement) (V43 3) (Z95 2)   38  Screening for genitourinary condition (V81 6) (Z13 89)   39  Spondylolisthesis, acquired (738 4) (M43 10)   40  Denied: History of Substance abuse   41  Thrombocytopenia (287 5) (D69 6)   42  Tracheobronchitis (490) (J40)   43  Traumatic open wound of left lower leg (891 0) (S81 802A)   44  Unspecified atrial fibrillation (427 31) (I48 91)   45  Upper respiratory infection (465 9) (J06 9)   46  Urinary tract infection (599 0) (N39 0)   47  Wound of left lower extremity, initial encounter (894 0) (S81 802A)   48  Wound of left lower extremity, subsequent encounter (V58 89,894 0) (N66 144K)    Current Meds   1  Amiodarone HCl - 200 MG Oral Tablet; TAKE 1 TABLET TWICE DAILY FOR 2 WEEK,   THEN DAILY THEREAFTER; Therapy: 58QDZ1774 to (Evaluate:28Jun2018)  Requested for: 27JAG0117; Last   Rx:30Nov2017 Ordered   2  AmLODIPine Besylate 5 MG Oral Tablet (Norvasc); TAKE ONE TABLET BY MOUTH   EVERY DAY AS DIRECTED; Therapy: 89LON2373 to (Gwenevere Numbers)  Requested for: 31BCR7741; Last   Rx:11Jan2018 Ordered   3  Aspirin EC Low Dose 81 MG Oral Tablet Delayed Release; TAKE 1 TABLET DAILY; Therapy: (Recorded:44Fhf3970) to Recorded   4  Atorvastatin Calcium 20 MG Oral Tablet (Lipitor); take one tablet by mouth every day; Therapy: 32TLZ5255 to 01 33 43 04 02)  Requested for: 85QKM1061; Last   Rx:04Jan2018 Ordered   5  CoQ10 CAPS; TAKE 1 CAPSULE DAILY WITH A MEAL; Therapy: (Recorded:47Iqo7523) to Recorded   6  Eliquis 5 MG Oral Tablet; Take 1 tablet twice daily; Therapy: 44MEY0591 to (Evaluate:33Gkq9156)  Requested for: 93JIY3877; Last   Rx:62Ixd8827 Ordered   7  Flonase Allergy Relief 50 MCG/ACT Nasal Suspension (Fluticasone Propionate); 1 spray   each nostril bid x 7 days;    Therapy: 16Uit4660 to (Evaluate:43Flu9090)  Requested for: 86IWU1377; Last   Rx:35Hny5456 Ordered   8  Folic Acid 1 MG Oral Tablet; TAKE 1 TABLET DAILY AS DIRECTED; Therapy: 85XTS3716 to (Evaluate:01Jan2017)  Requested for: 15PBG8144; Last   Rx:09Cae4373 Ordered   9  Ocuvite TABS; TAKE 1 TABLET DAILY; Therapy: (Recorded:35Pqr6608) to Recorded   10  Omega 3 CAPS; 1000 mg CAPSULE---1 DAILY; Therapy: (Recorded:79Pnq3039) to Recorded   11  Tramadol-Acetaminophen 37 5-325 MG Oral Tablet (Ultracet); TAKE 1 TABLET 4 times    daily PRN neck pain; Therapy: 08GZS3684 to (Evaluate:29Dec2017); Last Rx:30Oct2017 Ordered    Allergies    1  Heparin    2  No Known Environmental Allergies   3  No Known Food Allergies    Future Appointments    Date/Time Provider Specialty Site   02/01/2018 10:00 AM RYAN Wood   [facility] [address]     Signatures   Electronically signed by : Tristan Ivan, ; Jan 22 2018 12:58PM EST                       (Author)    Electronically signed by : RYAN Raymundo ; Jan 23 2018  9:32AM EST

## 2018-01-26 DIAGNOSIS — Z95.2 PRESENCE OF PROSTHETIC HEART VALVE: ICD-10-CM

## 2018-01-26 DIAGNOSIS — E78.5 HYPERLIPIDEMIA: ICD-10-CM

## 2018-01-26 DIAGNOSIS — I10 ESSENTIAL (PRIMARY) HYPERTENSION: ICD-10-CM

## 2018-01-26 DIAGNOSIS — I25.10 ATHEROSCLEROTIC HEART DISEASE OF NATIVE CORONARY ARTERY WITHOUT ANGINA PECTORIS: ICD-10-CM

## 2018-01-29 RX ORDER — VITAMIN B COMPLEX
1 TABLET ORAL DAILY
COMMUNITY

## 2018-01-29 RX ORDER — FUROSEMIDE 40 MG/1
40 TABLET ORAL
COMMUNITY
End: 2018-02-23 | Stop reason: ALTCHOICE

## 2018-01-29 RX ORDER — CHLORAL HYDRATE 500 MG
CAPSULE ORAL
COMMUNITY

## 2018-01-29 RX ORDER — LIDOCAINE 50 MG/G
OINTMENT TOPICAL
COMMUNITY
Start: 2015-08-19 | End: 2018-02-28 | Stop reason: CLARIF

## 2018-01-29 RX ORDER — AMINO ACIDS/MV,IRON,MIN
1 TABLET ORAL DAILY
COMMUNITY

## 2018-01-29 RX ORDER — FLUTICASONE PROPIONATE 50 MCG
1 SPRAY, SUSPENSION (ML) NASAL 2 TIMES DAILY
COMMUNITY
Start: 2016-07-21 | End: 2018-02-23 | Stop reason: ALTCHOICE

## 2018-01-29 RX ORDER — ASPIRIN 81 MG/1
1 TABLET ORAL DAILY
COMMUNITY
End: 2018-02-28 | Stop reason: CLARIF

## 2018-01-30 ENCOUNTER — TELEPHONE (OUTPATIENT)
Dept: CARDIOLOGY CLINIC | Facility: CLINIC | Age: 83
End: 2018-01-30

## 2018-01-30 NOTE — TELEPHONE ENCOUNTER
Patient's daughter called wanting to know what doctor was in the office today  She said she wanted to see Dr Clive Pressley and that her mother was in AFIB again and wanted to know what to do  As per last week your instructions were to take a 2nd Amiodarone or go to the ED if symptoms persisted  The patient had taken her AM dose and when I stated she should take the 2nd dose if she is in AFIB or go to the E D , daughter stated patient was "coming out of AFIB and she would wait until her appt" in 2 days on 2/1/18   No action required

## 2018-01-31 ENCOUNTER — APPOINTMENT (OUTPATIENT)
Dept: WOUND CARE | Facility: HOSPITAL | Age: 83
End: 2018-01-31
Attending: PODIATRIST
Payer: MEDICARE

## 2018-01-31 PROCEDURE — 99212 OFFICE O/P EST SF 10 MIN: CPT | Performed by: PODIATRIST

## 2018-02-01 ENCOUNTER — OFFICE VISIT (OUTPATIENT)
Dept: CARDIOLOGY CLINIC | Facility: CLINIC | Age: 83
End: 2018-02-01
Payer: MEDICARE

## 2018-02-01 VITALS
HEART RATE: 57 BPM | BODY MASS INDEX: 19.21 KG/M2 | SYSTOLIC BLOOD PRESSURE: 142 MMHG | DIASTOLIC BLOOD PRESSURE: 78 MMHG | WEIGHT: 119 LBS

## 2018-02-01 DIAGNOSIS — Z95.2 HISTORY OF AORTIC VALVE REPLACEMENT: ICD-10-CM

## 2018-02-01 DIAGNOSIS — I10 ESSENTIAL HYPERTENSION: ICD-10-CM

## 2018-02-01 DIAGNOSIS — Z95.1 HX OF CABG: ICD-10-CM

## 2018-02-01 DIAGNOSIS — I48.91 ATRIAL FIBRILLATION, UNSPECIFIED TYPE (HCC): Primary | ICD-10-CM

## 2018-02-01 DIAGNOSIS — I25.10 CORONARY ARTERY DISEASE INVOLVING NATIVE CORONARY ARTERY OF NATIVE HEART WITHOUT ANGINA PECTORIS: ICD-10-CM

## 2018-02-01 DIAGNOSIS — I48.0 PAROXYSMAL ATRIAL FIBRILLATION (HCC): ICD-10-CM

## 2018-02-01 DIAGNOSIS — E78.2 MIXED HYPERLIPIDEMIA: ICD-10-CM

## 2018-02-01 PROCEDURE — 99214 OFFICE O/P EST MOD 30 MIN: CPT | Performed by: INTERNAL MEDICINE

## 2018-02-01 PROCEDURE — 93000 ELECTROCARDIOGRAM COMPLETE: CPT | Performed by: INTERNAL MEDICINE

## 2018-02-01 NOTE — PROGRESS NOTES
Cardiology Follow Up    Maximus Kent  7/30/1929  [id number]  HEART & VASCULAR  [facility] Megan Perez  [address] [address]    1  Atrial fibrillation, unspecified type ([address] )  POCT ECG   2  Coronary artery disease involving native coronary artery of native heart without angina pectoris     3  Paroxysmal atrial fibrillation (HCC)     4  Hx of CABG     5  History of aortic valve replacement     6  Essential hypertension     7  Mixed hyperlipidemia         Interval History:     Mrs Yanni Merino comes in for follow-up given her history of paroxysmal and recurrent atrial fibrillation, along with her prior history of CAD and valvular heart disease  an another acute visit secondary to symptoms of palpitations and feeling as if she is going back into atrial fibrillation  She follows with Dr Pascale Curiel of our practice given her history of CAD and aortic valve disease  She is status post CABG in 2005 after being found to have multivessel disease, but then had progression of aortic valve disease and had a bioprosthetic aortic valve replaced in 2012  She had been on Multaq antiarrhythmic therapy, but seemed to be having breakthrough symptoms around our last appointment and was going to lose insurance coverage of this  She has had an intolerance to beta-blocker in the past likely due to bradycardia  Also, she was once on Coumadin but she tells me she requested coming off due to some side effects and had been on aspirin since  Her echocardiograms through the years of show normal LV systolic function and a normally functioning bioprosthetic aortic valve replacement  just before our last visit Simon Ny had an acceleration of symptoms that led to an emergency room visit  She was in sinus rhythm but had frequent ectopy  In follow-up we changed her from Multaq to amiodarone  We also initiated Eliquis for stroke prevention    When she initially switched to amiodarone, doing twice a day for 2 weeks and then daily, her symptoms subsided  However more recently they have come back and have been more persistent      Lieutenant Chandler arrives today in atrial fibrillation  Heart rates are controlled  She denies any current symptoms, but frequently will have palpitations, generalized fatigue and weakness along with headaches  She denies chest pain or shortness of breath  She denies any lightheadedness, presyncope or any syncope  Other than some mild lower extremity edema, she denies any signs or symptoms of CHF      Patient Active Problem List   Diagnosis    Hx of CABG    Aortic valve disorder    Coronary artery disease    Essential hypertension    Hyperlipidemia    Paroxysmal atrial fibrillation (HCC)    History of aortic valve replacement     Past Medical History:   Diagnosis Date    Atrial fibrillation ([facility] [address] )     CAD (coronary artery disease)     Hypertension      Social History     Social History    Marital status:      Spouse name: N/A    Number of children: N/A    Years of education: N/A     Occupational History    Not on file  Social History Main Topics    Smoking status: Former Smoker    Smokeless tobacco: Former User    Alcohol use Yes      Comment: social    Drug use: No    Sexual activity: Not on file     Other Topics Concern    Not on file     Social History Narrative    No narrative on file      History reviewed  No pertinent family history    Past Surgical History:   Procedure Laterality Date    AORTIC VALVE REPLACEMENT      CARDIAC SURGERY         Current Outpatient Prescriptions:     amiodarone 200 mg tablet, Take 200 mg by mouth daily, Disp: , Rfl:     amLODIPine (NORVASC) 5 mg tablet, Take by mouth, Disp: , Rfl:     apixaban (ELIQUIS) 5 mg, Take 5 mg by mouth 2 (two) times a day, Disp: , Rfl:     aspirin (ASPIRIN LOW DOSE) 81 mg EC tablet, Take 1 tablet by mouth daily, Disp: , Rfl:     atorvastatin (LIPITOR) 20 mg tablet, Take by mouth, Disp: , Rfl:     Coenzyme Q10 (COQ10) 100 MG CAPS, Take 1 capsule by mouth Daily, Disp: , Rfl:     conjugated estrogens (PREMARIN) vaginal cream, Apply a pea size amount of the estrogen cream to the opening of the vagina three nights per week , Disp: , Rfl:     fluticasone (FLONASE ALLERGY RELIEF) 50 mcg/act nasal spray, 1 spray into each nostril 2 (two) times a day, Disp: , Rfl:     fluticasone (FLONASE) 50 mcg/act nasal spray, into each nostril, Disp: , Rfl:     folic acid (FOLVITE) 1 mg tablet, Take 800 mcg by mouth  , Disp: , Rfl:     lidocaine (XYLOCAINE) 5 % ointment, Apply a small amount to affected area PRN up to TID, Disp: , Rfl:     Multiple Vitamins-Minerals (OCUVITE EXTRA) TABS, Take 1 tablet by mouth daily, Disp: , Rfl:     Omega-3 1000 MG CAPS, Take by mouth, Disp: , Rfl:     traMADol-acetaminophen (ULTRACET) 37 5-325 mg per tablet, Take 1 tablet by mouth every 6 (six) hours as needed for moderate pain, Disp: , Rfl:     dronedarone (MULTAQ) 400 mg tablet, Take by mouth, Disp: , Rfl:     furosemide (LASIX) 40 mg tablet, Take 40 mg by mouth, Disp: , Rfl:     Multiple Vitamins-Minerals (OCUVITE EXTRA PO), Take by mouth, Disp: , Rfl:     Omega-3 1000 MG CAPS, Take by mouth, Disp: , Rfl:   Allergies   Allergen Reactions    Heparin      Annotation - [id number]: LOW PLATELETS    Phenazopyridine        Labs:  Lab Results   Component Value Date     11/27/2017     08/04/2015    K 3 7 11/27/2017    K 3 6 08/04/2015     11/27/2017     08/04/2015    CO2 26 11/27/2017    CO2 26 4 08/04/2015    BUN 26 (H) 11/27/2017    BUN 19 08/04/2015    CREATININE 1 16 11/27/2017    CREATININE 1 19 08/04/2015    GLUCOSE 103 11/27/2017    GLUCOSE 92 08/04/2015    CALCIUM 8 7 11/27/2017    CALCIUM 8 6 08/04/2015     Imaging:   ECG demonstrates atrial fibrillation, incomplete right bundle branch block and prior anterior infarct      Review of Systems:  Review of Systems Constitutional: Positive for fatigue  HENT: Negative  Eyes: Negative  Respiratory: Negative  Cardiovascular: Positive for palpitations and leg swelling  Gastrointestinal: Negative  Musculoskeletal: Negative  Skin: Negative  Allergic/Immunologic: Negative  Neurological: Positive for weakness and headaches  Hematological: Negative  Psychiatric/Behavioral: Negative  All other systems reviewed and are negative  Physical Exam:  Physical Exam   Constitutional: She is oriented to person, place, and time  She appears well-developed and well-nourished  HENT:   Head: Normocephalic and atraumatic  Eyes: EOM are normal  Pupils are equal, round, and reactive to light  Right eye exhibits no discharge  Left eye exhibits no discharge  No scleral icterus  Neck: Normal range of motion  Neck supple  No JVD present  No thyromegaly present  Cardiovascular: S1 normal, S2 normal, intact distal pulses and normal pulses  An irregularly irregular rhythm present  Bradycardia present  PMI is not displaced  Exam reveals no gallop, no friction rub and no decreased pulses  Murmur heard  Crescendo decrescendo systolic murmur is present with a grade of 2/6   Pulmonary/Chest: Effort normal and breath sounds normal  No respiratory distress  She has no wheezes  She has no rales  Abdominal: Soft  Bowel sounds are normal  She exhibits no distension  There is no tenderness  Musculoskeletal: Normal range of motion  She exhibits edema  She exhibits no tenderness or deformity  Right lower leg: She exhibits edema  Left lower leg: She exhibits edema  Neurological: She is alert and oriented to person, place, and time  No cranial nerve deficit  Skin: Skin is warm and dry  No rash noted  Psychiatric: She has a normal mood and affect  Judgment and thought content normal    Nursing note and vitals reviewed  Discussion/Summary:    1   JESSICA Cody has had a recurrence of her atrial fibrillation  Her heart rates are controlled  We discussed going through cardioversion or changing medical therapy  I am going to try to increase her amiodarone to twice daily for another 2 weeks, and then later in the month check an ECG  If if atrial fibrillation persists, and she remains symptomatic we will pursue a cardioversion  I have asked her to call if symptoms worsen and we will just schedule a cardioversion  2  CAD -   Monico Rodriguez had prior CABG in 2005  She is without symptoms of angina  Another option would be to do updated ischemic testing, but we will  Work on getting her atrial fibrillation controlled 1st   We will discuss this at the next visit  3  Bioprosthetic aortic valve replacement -  Functioning normal by echocardiogram last month  No changes made today  She knows to take antibiotic prophylaxis for any dental procedures  4  HTN - Her Blood pressure is for the most part controlled  She should periodically check her blood pressure at home

## 2018-02-07 ENCOUNTER — APPOINTMENT (OUTPATIENT)
Dept: WOUND CARE | Facility: HOSPITAL | Age: 83
End: 2018-02-07
Attending: PODIATRIST
Payer: MEDICARE

## 2018-02-07 PROCEDURE — 99212 OFFICE O/P EST SF 10 MIN: CPT | Performed by: PODIATRIST

## 2018-02-12 DIAGNOSIS — M47.812 CERVICAL ARTHRITIS: Primary | ICD-10-CM

## 2018-02-23 ENCOUNTER — OFFICE VISIT (OUTPATIENT)
Dept: CARDIOLOGY CLINIC | Facility: CLINIC | Age: 83
End: 2018-02-23
Payer: MEDICARE

## 2018-02-23 VITALS — DIASTOLIC BLOOD PRESSURE: 64 MMHG | HEART RATE: 48 BPM | SYSTOLIC BLOOD PRESSURE: 168 MMHG

## 2018-02-23 DIAGNOSIS — I48.91 ATRIAL FIBRILLATION, UNSPECIFIED TYPE (HCC): Primary | ICD-10-CM

## 2018-02-23 PROCEDURE — 93000 ELECTROCARDIOGRAM COMPLETE: CPT | Performed by: INTERNAL MEDICINE

## 2018-02-23 NOTE — PROGRESS NOTES
Patient here for EKG for AFIB  Patient is taking  Amiodarone 200 mg BID for 2 weeks  Patient states she is feeling ok just a little dizzy  The dizziness is not something new this is since she was at her last appointment  After reviewing the EKG Dr Zackary Vaz instructed that she go back to taking her Amiodarone 200 mg Daily  And to schedule another EKG nurse visit in a week

## 2018-02-27 PROBLEM — N30.00 ACUTE CYSTITIS WITHOUT HEMATURIA: Status: ACTIVE | Noted: 2018-02-27

## 2018-02-27 NOTE — PROGRESS NOTES
Assessment/Plan:    Acute cystitis without hematuria  Symptoms for a few days  Presents for eval    Essential hypertension  Stable on current meds    Patient Active Problem List   Diagnosis    Hx of CABG    Aortic valve disorder    Coronary artery disease    Essential hypertension    Hyperlipidemia    Paroxysmal atrial fibrillation (HCC)    History of aortic valve replacement    Acute cystitis without hematuria     Orders Placed This Encounter   Procedures    POCT urine dip auto non-scope            Diagnoses and all orders for this visit:    Acute cystitis without hematuria    Essential hypertension    Dysuria  -     POCT urine dip auto non-scope    Urinary frequency  -     POCT urine dip auto non-scope    Other orders  -     Cancel: POCT urine dip          Subjective:      Patient ID: Beverly Maxwell is a 80 y o  female  Presents with sx of dysuria, frequency, burning   No fever        The following portions of the patient's history were reviewed and updated as appropriate: allergies, current medications, past family history, past medical history, past social history, past surgical history and problem list     Review of Systems   Constitutional: Positive for fatigue  Negative for chills and fever  Respiratory: Negative  Cardiovascular: Negative  Gastrointestinal: Negative for constipation, diarrhea and nausea  Endocrine: Negative  Genitourinary: Positive for dysuria, frequency and urgency  Musculoskeletal: Negative for back pain  Psychiatric/Behavioral: Negative for confusion           Objective:      Current Outpatient Prescriptions:     amiodarone 200 mg tablet, Take 200 mg by mouth daily  , Disp: , Rfl:     amLODIPine (NORVASC) 5 mg tablet, Take by mouth, Disp: , Rfl:     apixaban (ELIQUIS) 5 mg, Take 5 mg by mouth 2 (two) times a day, Disp: , Rfl:     atorvastatin (LIPITOR) 20 mg tablet, Take by mouth, Disp: , Rfl:     Coenzyme Q10 (COQ10) 100 MG CAPS, Take 1 capsule by mouth Daily, Disp: , Rfl:     conjugated estrogens (PREMARIN) vaginal cream, Apply a pea size amount of the estrogen cream to the opening of the vagina three nights per week , Disp: , Rfl:     Multiple Vitamins-Minerals (OCUVITE EXTRA) TABS, Take 1 tablet by mouth daily, Disp: , Rfl:     Omega-3 1000 MG CAPS, Take by mouth, Disp: , Rfl:     traMADol-acetaminophen (ULTRACET) 37 5-325 mg per tablet, Take 1 tablet by mouth 4 (four) times a day as needed for moderate pain, Disp: 120 tablet, Rfl: 1     Physical Exam   Constitutional: She is oriented to person, place, and time  She appears well-developed and well-nourished  Eyes: Pupils are equal, round, and reactive to light  Neck: Normal range of motion  Neck supple  No thyromegaly present  Cardiovascular: Normal rate, regular rhythm, normal heart sounds and intact distal pulses  No murmur heard  Pulmonary/Chest: Effort normal and breath sounds normal  She has no wheezes  She has no rales  Abdominal: Soft  Bowel sounds are normal  There is no tenderness  Musculoskeletal: Normal range of motion  She exhibits no edema, tenderness or deformity  Lymphadenopathy:     She has no cervical adenopathy  Neurological: She is alert and oriented to person, place, and time  She has normal reflexes  Skin: Skin is warm and dry  Psychiatric: She has a normal mood and affect  Vitals reviewed

## 2018-02-28 ENCOUNTER — OFFICE VISIT (OUTPATIENT)
Dept: FAMILY MEDICINE CLINIC | Facility: HOSPITAL | Age: 83
End: 2018-02-28
Payer: MEDICARE

## 2018-02-28 VITALS
RESPIRATION RATE: 16 BRPM | SYSTOLIC BLOOD PRESSURE: 108 MMHG | WEIGHT: 118 LBS | HEART RATE: 56 BPM | HEIGHT: 66 IN | BODY MASS INDEX: 18.96 KG/M2 | DIASTOLIC BLOOD PRESSURE: 50 MMHG | TEMPERATURE: 98.7 F

## 2018-02-28 DIAGNOSIS — R35.0 URINARY FREQUENCY: ICD-10-CM

## 2018-02-28 DIAGNOSIS — R30.0 DYSURIA: ICD-10-CM

## 2018-02-28 DIAGNOSIS — I10 ESSENTIAL HYPERTENSION: ICD-10-CM

## 2018-02-28 DIAGNOSIS — N30.00 ACUTE CYSTITIS WITHOUT HEMATURIA: Primary | ICD-10-CM

## 2018-02-28 LAB
SL AMB  POCT GLUCOSE, UA: ABNORMAL
SL AMB LEUKOCYTE ESTERASE,UA: 25
SL AMB POCT BILIRUBIN,UA: ABNORMAL
SL AMB POCT BLOOD,UA: ABNORMAL
SL AMB POCT CLARITY,UA: ABNORMAL
SL AMB POCT COLOR,UA: YELLOW
SL AMB POCT KETONES,UA: ABNORMAL
SL AMB POCT NITRITE,UA: ABNORMAL
SL AMB POCT PH,UA: 5
SL AMB POCT SPECIFIC GRAVITY,UA: 1.01
SL AMB POCT URINE PROTEIN: ABNORMAL
SL AMB POCT UROBILINOGEN: ABNORMAL

## 2018-02-28 PROCEDURE — 87186 SC STD MICRODIL/AGAR DIL: CPT | Performed by: INTERNAL MEDICINE

## 2018-02-28 PROCEDURE — 87086 URINE CULTURE/COLONY COUNT: CPT | Performed by: INTERNAL MEDICINE

## 2018-02-28 PROCEDURE — 81003 URINALYSIS AUTO W/O SCOPE: CPT | Performed by: INTERNAL MEDICINE

## 2018-02-28 PROCEDURE — 87077 CULTURE AEROBIC IDENTIFY: CPT | Performed by: INTERNAL MEDICINE

## 2018-02-28 PROCEDURE — 99213 OFFICE O/P EST LOW 20 MIN: CPT | Performed by: INTERNAL MEDICINE

## 2018-02-28 RX ORDER — CIPROFLOXACIN 250 MG/1
250 TABLET, FILM COATED ORAL EVERY 12 HOURS SCHEDULED
Qty: 10 TABLET | Refills: 0 | Status: SHIPPED | OUTPATIENT
Start: 2018-02-28 | End: 2018-03-05

## 2018-02-28 RX ORDER — CLOTRIMAZOLE AND BETAMETHASONE DIPROPIONATE 10; .64 MG/G; MG/G
CREAM TOPICAL 2 TIMES DAILY
Qty: 30 G | Refills: 2 | Status: SHIPPED | OUTPATIENT
Start: 2018-02-28 | End: 2019-02-19 | Stop reason: ALTCHOICE

## 2018-02-28 NOTE — PATIENT INSTRUCTIONS

## 2018-03-02 ENCOUNTER — CLINICAL SUPPORT (OUTPATIENT)
Dept: CARDIOLOGY CLINIC | Facility: CLINIC | Age: 83
End: 2018-03-02
Payer: MEDICARE

## 2018-03-02 VITALS — SYSTOLIC BLOOD PRESSURE: 172 MMHG | HEART RATE: 46 BPM | DIASTOLIC BLOOD PRESSURE: 74 MMHG

## 2018-03-02 DIAGNOSIS — I48.0 PAF (PAROXYSMAL ATRIAL FIBRILLATION) (HCC): Primary | ICD-10-CM

## 2018-03-02 DIAGNOSIS — I48.91 ATRIAL FIBRILLATION, UNSPECIFIED TYPE (HCC): Primary | ICD-10-CM

## 2018-03-02 LAB — BACTERIA UR CULT: ABNORMAL

## 2018-03-02 PROCEDURE — 93000 ELECTROCARDIOGRAM COMPLETE: CPT | Performed by: INTERNAL MEDICINE

## 2018-03-02 NOTE — PROGRESS NOTES
Patient seen for returned EKG for AFIB  Patient stated she is not feeling well today  She is feeling a little light headed  Patient's vitals:   BP - 172/74  P - 46    As per Dr [place] Spr[place] patient is to take Amiodarone 100 mg daily in place of her Amiodarone 200 mg daily and follow up with Dr Gisell Gallardo  Patient has scheduled an appointment on 3/29/18

## 2018-03-02 NOTE — TELEPHONE ENCOUNTER
Samples given:  Eliquis 5 mg BID  Lot#: [id number]  Exp: 3/2020  Man[employer]: [employer]   70 tablets

## 2018-03-09 ENCOUNTER — TRANSCRIBE ORDERS (OUTPATIENT)
Dept: ADMINISTRATIVE | Facility: HOSPITAL | Age: 83
End: 2018-03-09

## 2018-03-09 ENCOUNTER — OFFICE VISIT (OUTPATIENT)
Dept: FAMILY MEDICINE CLINIC | Facility: HOSPITAL | Age: 83
End: 2018-03-09
Payer: MEDICARE

## 2018-03-09 ENCOUNTER — HOSPITAL ENCOUNTER (OUTPATIENT)
Dept: CT IMAGING | Facility: HOSPITAL | Age: 83
Discharge: HOME | End: 2018-03-09
Attending: INTERNAL MEDICINE
Payer: MEDICARE

## 2018-03-09 ENCOUNTER — TELEPHONE (OUTPATIENT)
Dept: FAMILY MEDICINE CLINIC | Facility: HOSPITAL | Age: 83
End: 2018-03-09

## 2018-03-09 VITALS
HEART RATE: 52 BPM | DIASTOLIC BLOOD PRESSURE: 76 MMHG | WEIGHT: 117 LBS | BODY MASS INDEX: 18.8 KG/M2 | SYSTOLIC BLOOD PRESSURE: 142 MMHG | HEIGHT: 66 IN

## 2018-03-09 DIAGNOSIS — R42 DIZZINESS AND GIDDINESS: Primary | ICD-10-CM

## 2018-03-09 DIAGNOSIS — R42 DIZZINESS: Primary | ICD-10-CM

## 2018-03-09 DIAGNOSIS — R42 DIZZINESS AND GIDDINESS: ICD-10-CM

## 2018-03-09 DIAGNOSIS — I48.0 PAROXYSMAL ATRIAL FIBRILLATION (HCC): ICD-10-CM

## 2018-03-09 DIAGNOSIS — E78.2 MIXED HYPERLIPIDEMIA: ICD-10-CM

## 2018-03-09 DIAGNOSIS — I25.10 CORONARY ARTERY DISEASE INVOLVING NATIVE CORONARY ARTERY OF NATIVE HEART WITHOUT ANGINA PECTORIS: ICD-10-CM

## 2018-03-09 PROCEDURE — 99214 OFFICE O/P EST MOD 30 MIN: CPT | Performed by: INTERNAL MEDICINE

## 2018-03-09 PROCEDURE — 70450 CT HEAD/BRAIN W/O DYE: CPT

## 2018-03-09 NOTE — PROGRESS NOTES
Assessment/Plan:    No problem-specific Assessment & Plan notes found for this encounter  Problem List Items Addressed This Visit     None            Subjective:      Patient ID: Maximus Kent is a 80 y o  female    1  paf- seeing Dr Barbee Frankel- on eliquis now( in past had and changed from multaq to amiodarone since December  Still having episodes of afib which has developed mid morning  Had one episode last week- had bradycardia( upper 40's) and dose of amiodarone was decreased to 100 mg dailyon her last visit with Dr Barbee Frankel  2  Dizziness- in past 2 weeks - feels whoozy and unbalanced  Still active   No spinning but has chronic ringing in ears which has not worsened  Still playing [employer]- 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        The following portions of the patient's history were reviewed and updated as appropriate: allergies, current medications and problem list      Review of Systems   All other systems reviewed and are negative          Objective:      Current Outpatient Prescriptions:     amiodarone 200 mg tablet, Take 200 mg by mouth daily  , Disp: , Rfl:     amLODIPine (NORVASC) 5 mg tablet, Take by mouth, Disp: , Rfl:     apixaban (ELIQUIS) 5 mg, Take 1 tablet (5 mg total) by mouth 2 (two) times a day, Disp: 70 tablet, Rfl: 0    atorvastatin (LIPITOR) 20 mg tablet, Take by mouth, Disp: , Rfl:     clotrimazole-betamethasone (LOTRISONE) 1-0 05 % cream, Apply topically 2 (two) times a day, Disp: 30 g, Rfl: 2    Coenzyme Q10 (COQ10) 100 MG CAPS, Take 1 capsule by mouth Daily, Disp: , Rfl:     conjugated estrogens (PREMARIN) vaginal cream, Apply a pea size amount of the estrogen cream to the opening of the vagina three nights per week , Disp: , Rfl:     Multiple Vitamins-Minerals (OCUVITE EXTRA) TABS, Take 1 tablet by mouth daily, Disp: , Rfl:     Omega-3 1000 MG CAPS, Take by mouth, Disp: , Rfl:     traMADol-acetaminophen (ULTRACET) 37 5-325 mg per tablet, Take 1 tablet by mouth 4 (four) times a day as needed for moderate pain, Disp: 120 tablet, Rfl: 1    /72   Pulse 90   Resp 16   Ht 5' 3" (1 6 m)   Wt 65 3 kg (144 lb)   BMI 25 51 kg/m²          Physical Exam   Constitutional: She is oriented to person, place, and time  She appears well-developed and well-nourished  HENT:   Head: Normocephalic and atraumatic  Eyes: EOM are normal  Pupils are equal, round, and reactive to light  Right eye exhibits no discharge  Left eye exhibits no discharge  Neck: No JVD present  No tracheal deviation present  No thyromegaly present  Cardiovascular: Normal rate and regular rhythm  Murmur heard  FDANA8/5 systolic murmur-   Pulmonary/Chest: Effort normal and breath sounds normal  She has no wheezes  She has no rales  Abdominal: Soft  Bowel sounds are normal  She exhibits no distension  There is no tenderness  Musculoskeletal:   Trace edema   Neurological: She is alert and oriented to person, place, and time  Nursing note and vitals reviewed

## 2018-03-10 ENCOUNTER — APPOINTMENT (OUTPATIENT)
Dept: LAB | Facility: HOSPITAL | Age: 83
End: 2018-03-10
Attending: INTERNAL MEDICINE
Payer: MEDICARE

## 2018-03-10 DIAGNOSIS — I48.0 PAROXYSMAL ATRIAL FIBRILLATION (HCC): ICD-10-CM

## 2018-03-10 DIAGNOSIS — I25.10 CORONARY ARTERY DISEASE INVOLVING NATIVE CORONARY ARTERY OF NATIVE HEART WITHOUT ANGINA PECTORIS: ICD-10-CM

## 2018-03-10 LAB
ALBUMIN SERPL BCP-MCNC: 3.8 G/DL (ref 3.5–5)
ALP SERPL-CCNC: 69 U/L (ref 46–116)
ALT SERPL W P-5'-P-CCNC: 51 U/L (ref 12–78)
ANION GAP SERPL CALCULATED.3IONS-SCNC: 8 MMOL/L (ref 4–13)
AST SERPL W P-5'-P-CCNC: 31 U/L (ref 5–45)
BASOPHILS # BLD AUTO: 0.03 THOUSANDS/ΜL (ref 0–0.1)
BASOPHILS NFR BLD AUTO: 0 % (ref 0–1)
BILIRUB SERPL-MCNC: 0.7 MG/DL (ref 0.2–1)
BUN SERPL-MCNC: 21 MG/DL (ref 5–25)
CALCIUM SERPL-MCNC: 8.5 MG/DL (ref 8.3–10.1)
CHLORIDE SERPL-SCNC: 103 MMOL/L (ref 100–108)
CHOLEST SERPL-MCNC: 164 MG/DL (ref 50–200)
CO2 SERPL-SCNC: 30 MMOL/L (ref 21–32)
CREAT SERPL-MCNC: 0.97 MG/DL (ref 0.6–1.3)
EOSINOPHIL # BLD AUTO: 0.09 THOUSAND/ΜL (ref 0–0.61)
EOSINOPHIL NFR BLD AUTO: 1 % (ref 0–6)
ERYTHROCYTE [DISTWIDTH] IN BLOOD BY AUTOMATED COUNT: 14.5 % (ref 11.6–15.1)
GFR SERPL CREATININE-BSD FRML MDRD: 52 ML/MIN/1.73SQ M
GLUCOSE P FAST SERPL-MCNC: 94 MG/DL (ref 65–99)
HCT VFR BLD AUTO: 41.7 % (ref 34.8–46.1)
HDLC SERPL-MCNC: 74 MG/DL (ref 40–60)
HGB BLD-MCNC: 13.3 G/DL (ref 11.5–15.4)
LDLC SERPL CALC-MCNC: 81 MG/DL (ref 0–100)
LYMPHOCYTES # BLD AUTO: 2.04 THOUSANDS/ΜL (ref 0.6–4.47)
LYMPHOCYTES NFR BLD AUTO: 29 % (ref 14–44)
MAGNESIUM SERPL-MCNC: 2.1 MG/DL (ref 1.6–2.6)
MCH RBC QN AUTO: 29.8 PG (ref 26.8–34.3)
MCHC RBC AUTO-ENTMCNC: 31.9 G/DL (ref 31.4–37.4)
MCV RBC AUTO: 93 FL (ref 82–98)
MONOCYTES # BLD AUTO: 0.91 THOUSAND/ΜL (ref 0.17–1.22)
MONOCYTES NFR BLD AUTO: 13 % (ref 4–12)
NEUTROPHILS # BLD AUTO: 4.01 THOUSANDS/ΜL (ref 1.85–7.62)
NEUTS SEG NFR BLD AUTO: 57 % (ref 43–75)
PLATELET # BLD AUTO: 144 THOUSANDS/UL (ref 149–390)
PMV BLD AUTO: 11.4 FL (ref 8.9–12.7)
POTASSIUM SERPL-SCNC: 3.7 MMOL/L (ref 3.5–5.3)
PROT SERPL-MCNC: 7.3 G/DL (ref 6.4–8.2)
RBC # BLD AUTO: 4.47 MILLION/UL (ref 3.81–5.12)
SODIUM SERPL-SCNC: 141 MMOL/L (ref 136–145)
TRIGL SERPL-MCNC: 46 MG/DL
TSH SERPL DL<=0.05 MIU/L-ACNC: 2.86 UIU/ML (ref 0.36–3.74)
WBC # BLD AUTO: 7.08 THOUSAND/UL (ref 4.31–10.16)

## 2018-03-10 PROCEDURE — 36415 COLL VENOUS BLD VENIPUNCTURE: CPT

## 2018-03-10 PROCEDURE — 83735 ASSAY OF MAGNESIUM: CPT

## 2018-03-10 PROCEDURE — 80061 LIPID PANEL: CPT

## 2018-03-10 PROCEDURE — 85025 COMPLETE CBC W/AUTO DIFF WBC: CPT

## 2018-03-10 PROCEDURE — 84443 ASSAY THYROID STIM HORMONE: CPT

## 2018-03-10 PROCEDURE — 80053 COMPREHEN METABOLIC PANEL: CPT

## 2018-03-13 DIAGNOSIS — I48.91 ATRIAL FIBRILLATION, UNSPECIFIED TYPE (HCC): Primary | ICD-10-CM

## 2018-03-13 NOTE — TELEPHONE ENCOUNTER
Pt  Called for samples of Eliquis as well   2 boxes (14 tablets) given to pt 's daughter      QLM#[id number]  EXP: 3/2020

## 2018-03-14 RX ORDER — AMIODARONE HYDROCHLORIDE 100 MG/1
TABLET ORAL
Qty: 90 TABLET | Refills: 3 | Status: SHIPPED | OUTPATIENT
Start: 2018-03-14 | End: 2019-02-19

## 2018-03-21 ENCOUNTER — TELEPHONE (OUTPATIENT)
Dept: CARDIOLOGY CLINIC | Facility: CLINIC | Age: 83
End: 2018-03-21

## 2018-03-29 ENCOUNTER — OFFICE VISIT (OUTPATIENT)
Dept: CARDIOLOGY CLINIC | Facility: CLINIC | Age: 83
End: 2018-03-29
Payer: MEDICARE

## 2018-03-29 VITALS
SYSTOLIC BLOOD PRESSURE: 90 MMHG | HEIGHT: 64 IN | WEIGHT: 118 LBS | DIASTOLIC BLOOD PRESSURE: 60 MMHG | BODY MASS INDEX: 20.14 KG/M2 | HEART RATE: 72 BPM

## 2018-03-29 DIAGNOSIS — Z95.2 HISTORY OF AORTIC VALVE REPLACEMENT: ICD-10-CM

## 2018-03-29 DIAGNOSIS — I48.91 ATRIAL FIBRILLATION, UNSPECIFIED TYPE (HCC): Primary | ICD-10-CM

## 2018-03-29 DIAGNOSIS — I25.10 CORONARY ARTERY DISEASE INVOLVING NATIVE CORONARY ARTERY OF NATIVE HEART WITHOUT ANGINA PECTORIS: ICD-10-CM

## 2018-03-29 DIAGNOSIS — Z95.1 HX OF CABG: ICD-10-CM

## 2018-03-29 DIAGNOSIS — I10 ESSENTIAL HYPERTENSION: ICD-10-CM

## 2018-03-29 DIAGNOSIS — I48.0 PAROXYSMAL ATRIAL FIBRILLATION (HCC): ICD-10-CM

## 2018-03-29 DIAGNOSIS — E78.2 MIXED HYPERLIPIDEMIA: ICD-10-CM

## 2018-03-29 PROCEDURE — 93000 ELECTROCARDIOGRAM COMPLETE: CPT | Performed by: INTERNAL MEDICINE

## 2018-03-29 PROCEDURE — 99214 OFFICE O/P EST MOD 30 MIN: CPT | Performed by: INTERNAL MEDICINE

## 2018-03-29 NOTE — PROGRESS NOTES
Cardiology Follow Up    Kiley Luna  7/30/1929  [id number]  HEART & VASCULAR  [facility] Gabby Huang  [address]  [address] [address] [address]    1  Atrial fibrillation, unspecified type ([address] )  POCT ECG   2  Coronary artery disease involving native coronary artery of native heart without angina pectoris     3  Paroxysmal atrial fibrillation (HCC)     4  Hx of CABG     5  History of aortic valve replacement     6  Mixed hyperlipidemia     7  Essential hypertension         Interval History:     Mrs Patterson Him comes in for follow-up given her history of paroxysmal and recurrent atrial fibrillation, along with her prior history of CAD and valvular heart disease  an another acute visit secondary to symptoms of palpitations and feeling as if she is going back into atrial fibrillation  She follows with Dr Lisbeth Tom of our practice given her history of CAD and aortic valve disease  She is status post CABG in 2005 after being found to have multivessel disease, but then had progression of aortic valve disease and had a bioprosthetic aortic valve replaced in 2012  She had been on Multaq antiarrhythmic therapy, but seemed to be having breakthrough symptoms around our last appointment and was going to lose insurance coverage of this  She has had an intolerance to beta-blocker in the past likely due to bradycardia  Also, she was once on Coumadin but she tells me she requested coming off due to some side effects and had been on aspirin since  Her echocardiograms through the years of show normal LV systolic function and a normally functioning bioprosthetic aortic valve replacement  A few visits ago Ruben Abbott had an acceleration of symptoms that led to an emergency room visit  She was in sinus rhythm but had frequent ectopy  In follow-up we changed her from Multaq to amiodarone  We also initiated Eliquis for stroke prevention    When she initially switched to amiodarone, doing twice a day for 2 weeks and then daily, her symptoms subsided       Christine Merritt  Has been in and out of atrial fibrillation over the last few visits  She came in for an ECG check earlier this month in which she was in a junctional rhythm and therefore we backed off of her amiodarone to 100 mg daily  Today she is in atrial fibrillation with a controlled heart rate in the 70s  She still gets intermittent palpitations  She also gets intermittently lightheaded  No near-syncope or syncope  Overall she feels well  She is still active and goes above and beyond her activities of daily living  She does notices the symptoms from time to time  She denies chest pain or shortness of breath    Other than some mild lower extremity edema, she denies any signs or symptoms of CHF      Patient Active Problem List   Diagnosis    Hx of CABG    Aortic valve disorder    Coronary artery disease    Essential hypertension    Hyperlipidemia    Paroxysmal atrial fibrillation (HCC)    History of aortic valve replacement    Acute cystitis without hematuria    Arteriosclerosis of carotid artery    Cervical spine arthritis ([address] )    Cholelithiasis without cholecystitis    Chronic cervical radiculopathy    Cystitis, chronic    Dystrophy of vulva    Mixed stress and urge urinary incontinence    Neural foraminal stenosis of cervical spine    Occipital neuralgia of right side    Osteoarthritis    Peripheral neuropathy     Past Medical History:   Diagnosis Date    Acute myocardial infarction     Atrial fibrillation ([address] )     CAD (coronary artery disease)     Cellulitis      AND ABSCESS    Cholecystitis     Closed Colles' fracture     OF THE LEFT WRIST; LAST ASSESSED: 5/2/14    Closed fracture of radius     LAST ASSESSED: 2/19/14    Dyspnea     Hair loss disorder     Hematuria     History of being hospitalized     1/5/12-1/14/12 Juan Diego MACIEL PROCEDURES: 1) REDO STERNOTOMY AND AORTIC VALVE REPLACEMENT WITH A 19-MM REBA-PETERS BOVINIE PERICARDIAL MAGNA EASE VALVE 2) TRANSESOPHAGEAL ECHOCARDIOGRAM     Hypercholesterolemia     Hypertension     Incomplete bladder emptying     Leg wound, left     Malaise and fatigue     Sick sinus syndrome [facility])      Social History     Social History    Marital status:      Spouse name: N/A    Number of children: N/A    Years of education: N/A     Occupational History    RETIRED      Social History Main Topics    Smoking status: Former Smoker    Smokeless tobacco: Never Used      Comment: QUIT 36 YEARS AGO ALSO NEVER A SMOKER AS PER [employer]    Alcohol use Yes      Comment: social    Drug use: No    Sexual activity: Not on file     Other Topics Concern    Not on file     Social History Narrative    Lives with son  Feels safe  Has living will      ACTIVE ADVANCE DIRECTIVE    CAFFEINE USE: 1 1/2 CUPS COFFEE QD    EXERCISE HABITS    GOOD DENTAL HYGIENE    LIVING SITUATION: SUPPORTIVE AND SAFE          Family History   Problem Relation Age of Onset    Diabetes Mother      MELLITUS    Hypertension Mother     Heart disease Mother     Prostate cancer Father     Dementia Brother     Heart disease Maternal Grandmother     Hypertension Maternal Grandmother     Heart disease Maternal Grandfather     Hypertension Family     Osteoporosis Family     Substance Abuse Neg Hx     Mental illness Neg Hx      Past Surgical History:   Procedure Laterality Date    AORTIC VALVE REPLACEMENT  01/2012    HEART VALVE    CARDIAC SURGERY      CATARACT EXTRACTION W/  INTRAOCULAR LENS IMPLANT  08/26/2010    Lc Torres MD; PHACOEMULISIFICATION; INSERTION INTRAOCULAR LENS OD    CORONARY ARTERY BYPASS GRAFT  01/2005       Current Outpatient Prescriptions:     amiodarone 100 mg tablet, Take 1 tablet daily, Disp: 90 tablet, Rfl: 3    amLODIPine (NORVASC) 5 mg tablet, Take by mouth, Disp: , Rfl:     apixaban (ELIQUIS) 5 mg, Take 1 tablet (5 mg total) by mouth 2 (two) times a day, Disp: 70 tablet, Rfl: 0    atorvastatin (LIPITOR) 20 mg tablet, Take by mouth, Disp: , Rfl:     clotrimazole-betamethasone (LOTRISONE) 1-0 05 % cream, Apply topically 2 (two) times a day, Disp: 30 g, Rfl: 2    Coenzyme Q10 (COQ10) 100 MG CAPS, Take 1 capsule by mouth Daily, Disp: , Rfl:     conjugated estrogens (PREMARIN) vaginal cream, Apply a pea size amount of the estrogen cream to the opening of the vagina three nights per week , Disp: , Rfl:     Multiple Vitamins-Minerals (OCUVITE EXTRA) TABS, Take 1 tablet by mouth daily, Disp: , Rfl:     Omega-3 1000 MG CAPS, Take by mouth, Disp: , Rfl:     traMADol-acetaminophen (ULTRACET) 37 5-325 mg per tablet, Take 1 tablet by mouth 4 (four) times a day as needed for moderate pain, Disp: 120 tablet, Rfl: 1  Allergies   Allergen Reactions    Heparin      Annotation - [id number]: LOW PLATELETS    Phenazopyridine        Labs:  Lab Results   Component Value Date     03/10/2018     08/04/2015    K 3 7 03/10/2018    K 3 6 08/04/2015     03/10/2018     08/04/2015    CO2 30 03/10/2018    CO2 26 4 08/04/2015    BUN 21 03/10/2018    BUN 19 08/04/2015    CREATININE 0 97 03/10/2018    CREATININE 1 19 08/04/2015    GLUCOSE 103 11/27/2017    GLUCOSE 92 08/04/2015    CALCIUM 8 5 03/10/2018    CALCIUM 8 6 08/04/2015     Imaging:   ECG demonstrates atrial fibrillation, incomplete right bundle branch block and prior anterior infarct  Review of Systems:  Review of Systems   Constitutional: Positive for fatigue  HENT: Negative  Eyes: Negative  Respiratory: Negative  Cardiovascular: Positive for palpitations and leg swelling  Gastrointestinal: Negative  Musculoskeletal: Negative  Skin: Negative  Allergic/Immunologic: Negative  Neurological: Positive for weakness  Hematological: Negative  Psychiatric/Behavioral: Negative  All other systems reviewed and are negative        Physical Exam:  Physical Exam Constitutional: She is oriented to person, place, and time  She appears well-developed and well-nourished  HENT:   Head: Normocephalic and atraumatic  Eyes: EOM are normal  Pupils are equal, round, and reactive to light  Right eye exhibits no discharge  Left eye exhibits no discharge  No scleral icterus  Neck: Normal range of motion  Neck supple  No JVD present  No thyromegaly present  Cardiovascular: Normal rate, S1 normal, S2 normal, intact distal pulses and normal pulses  An irregularly irregular rhythm present  PMI is not displaced  Exam reveals no gallop and no friction rub  Murmur heard  Crescendo decrescendo systolic murmur is present with a grade of 2/6   Pulmonary/Chest: Effort normal and breath sounds normal  No respiratory distress  She has no wheezes  She has no rales  Abdominal: Soft  Bowel sounds are normal  She exhibits no distension  There is no tenderness  Musculoskeletal: Normal range of motion  She exhibits edema  She exhibits no tenderness or deformity  Right lower leg: She exhibits edema  Left lower leg: She exhibits edema  Neurological: She is alert and oriented to person, place, and time  No cranial nerve deficit  Skin: Skin is warm and dry  No rash noted  Psychiatric: She has a normal mood and affect  Judgment and thought content normal    Nursing note and vitals reviewed  Discussion/Summary:    1  JESSICA Douglass remains atrial fibrillation  Her heart rates are controlled  We discussed going through cardioversion or changing medical therapy  Going up on the amiodarone caused her to be bradycardic with a junctional rhythm  She wanted to leave things as they are and we will follow clinically over the next few months  Given her lightheadedness, I did suggest an event monitor to see if she continues to be bradycardic which would necessitate a pacemaker  She wanted to put this off for now    I have asked her to continue to check her pulse and blood pressure regularly  We will see her back in 3 months  She will remain on Eliquis for stroke prevention  2  CAD -   Monico Rodriguez had prior CABG in 2005  She is without symptoms of angina  No other changes were made to her medical therapy  3  Bioprosthetic aortic valve replacement -  Functioning normal by echocardiogram last month  No changes made today  She knows to take antibiotic prophylaxis for any dental procedures  4  HTN - Her Blood pressure is for the most part controlled  She should periodically check her blood pressure at home  Counseling / Coordination of Care  Total floor / unit time spent today 25 minutes  Greater than 50% of total time was spent with the patient and / or family counseling and / or coordination of care

## 2018-04-02 ENCOUNTER — HOSPITAL ENCOUNTER (OUTPATIENT)
Dept: PULMONOLOGY | Facility: HOSPITAL | Age: 83
Discharge: HOME | End: 2018-04-02
Attending: INTERNAL MEDICINE

## 2018-04-02 RX ORDER — ALBUTEROL SULFATE 2.5 MG/3ML
2.5 SOLUTION RESPIRATORY (INHALATION) EVERY 6 HOURS PRN
Status: DISCONTINUED | OUTPATIENT
Start: 2018-04-02 | End: 2018-04-06 | Stop reason: HOSPADM

## 2018-04-03 ENCOUNTER — OFFICE VISIT (OUTPATIENT)
Dept: FAMILY MEDICINE CLINIC | Facility: HOSPITAL | Age: 83
End: 2018-04-03
Payer: MEDICARE

## 2018-04-03 VITALS
SYSTOLIC BLOOD PRESSURE: 140 MMHG | TEMPERATURE: 95.4 F | WEIGHT: 117.5 LBS | OXYGEN SATURATION: 98 % | BODY MASS INDEX: 20.06 KG/M2 | DIASTOLIC BLOOD PRESSURE: 80 MMHG | HEART RATE: 96 BPM | HEIGHT: 64 IN

## 2018-04-03 DIAGNOSIS — E78.2 MIXED HYPERLIPIDEMIA: ICD-10-CM

## 2018-04-03 DIAGNOSIS — I10 ESSENTIAL HYPERTENSION: ICD-10-CM

## 2018-04-03 DIAGNOSIS — M47.812 CERVICAL ARTHRITIS: ICD-10-CM

## 2018-04-03 DIAGNOSIS — I48.0 PAROXYSMAL ATRIAL FIBRILLATION (HCC): ICD-10-CM

## 2018-04-03 DIAGNOSIS — M54.81 OCCIPITAL NEURALGIA OF RIGHT SIDE: ICD-10-CM

## 2018-04-03 DIAGNOSIS — Z95.2 HISTORY OF AORTIC VALVE REPLACEMENT: Primary | ICD-10-CM

## 2018-04-03 DIAGNOSIS — Z95.1 HX OF CABG: ICD-10-CM

## 2018-04-03 PROCEDURE — 99214 OFFICE O/P EST MOD 30 MIN: CPT | Performed by: INTERNAL MEDICINE

## 2018-04-03 NOTE — PROGRESS NOTES
Assessment/Plan:       Problem List Items Addressed This Visit        Cardiovascular and Mediastinum    Essential hypertension     Stable bp today         Paroxysmal atrial fibrillation (HCC)     Seen by Dr Lee Rivera last week- in atrial fib with controlled rates- in and out of it- now may be in most of the time- offered option of cardioversion- not feeling bad today in afib- may be a bit more tired  Mild fatigue with steps  had brief trial of 200 mg amiodarone but then decreased back to 100 mg daily as she had lower pulses            Other    Hx of CABG    Hyperlipidemia     Reviewed recent labs- good control won current atorvastatin         History of aortic valve replacement - Primary    Occipital neuralgia of right side     Chronic- had injections in past for that- uses tramadol prn in prn                 Subjective:      Patient ID: Kiley Connolly is a 80 y o  female    Scheduled for pft- had cancelled due to snow- now to be done tomorrow        The following portions of the patient's history were reviewed and updated as appropriate: allergies, current medications and problem list      Review of Systems   Respiratory: Positive for shortness of breath  Mild dizziness with activity- just had it sitting her in office   All other systems reviewed and are negative          Objective:      Current Outpatient Prescriptions:     amiodarone 100 mg tablet, Take 1 tablet daily, Disp: 90 tablet, Rfl: 3    amLODIPine (NORVASC) 5 mg tablet, Take by mouth, Disp: , Rfl:     apixaban (ELIQUIS) 5 mg, Take 1 tablet (5 mg total) by mouth 2 (two) times a day, Disp: 70 tablet, Rfl: 0    atorvastatin (LIPITOR) 20 mg tablet, Take by mouth, Disp: , Rfl:     clotrimazole-betamethasone (LOTRISONE) 1-0 05 % cream, Apply topically 2 (two) times a day, Disp: 30 g, Rfl: 2    Coenzyme Q10 (COQ10) 100 MG CAPS, Take 1 capsule by mouth Daily, Disp: , Rfl:     conjugated estrogens (PREMARIN) vaginal cream, Apply a pea size amount of the estrogen cream to the opening of the vagina three nights per week , Disp: , Rfl:     Multiple Vitamins-Minerals (OCUVITE EXTRA) TABS, Take 1 tablet by mouth daily, Disp: , Rfl:     Omega-3 1000 MG CAPS, Take by mouth, Disp: , Rfl:     traMADol-acetaminophen (ULTRACET) 37 5-325 mg per tablet, Take 1 tablet by mouth 4 (four) times a day as needed for moderate pain, Disp: 120 tablet, Rfl: 1  No current facility-administered medications for this visit  Facility-Administered Medications Ordered in Other Visits:     albuterol inhalation solution 2 5 mg, 2 5 mg, Nebulization, Q6H PRN, Za Fly, DO    /72   Pulse 90   Resp 16   Ht 5' 3" (1 6 m)   Wt 65 3 kg (144 lb)   BMI 25 51 kg/m²          Physical Exam   Constitutional: She is oriented to person, place, and time  She appears well-developed and well-nourished  HENT:   Head: Normocephalic and atraumatic  Eyes: EOM are normal  Pupils are equal, round, and reactive to light  Right eye exhibits no discharge  Left eye exhibits no discharge  Neck: No JVD present  No tracheal deviation present  No thyromegaly present  Cardiovascular: Normal rate  Murmur heard  [id number]/[id number] systolic murmur-irreg irreg    Pulmonary/Chest: Effort normal and breath sounds normal  She has no wheezes  She has no rales  Abdominal: Soft  Bowel sounds are normal  She exhibits no distension  There is no tenderness  Musculoskeletal:   Trace edema in ankle  Leg wounds are healed other than right nodular lesion in remaining area- mild tenderness   Neurological: She is alert and oriented to person, place, and time  Nursing note and vitals reviewed

## 2018-04-03 NOTE — ASSESSMENT & PLAN NOTE
Seen by Dr Interiano Degree last week- in atrial fib with controlled rates- in and out of it- now may be in most of the time- offered option of cardioversion- not feeling bad today in afib- may be a bit more tired  Mild fatigue with steps     had brief trial of 200 mg amiodarone but then decreased back to 100 mg daily as she had lower pulses

## 2018-04-04 ENCOUNTER — HOSPITAL ENCOUNTER (OUTPATIENT)
Dept: PULMONOLOGY | Facility: HOSPITAL | Age: 83
Discharge: HOME | End: 2018-04-04
Attending: INTERNAL MEDICINE
Payer: MEDICARE

## 2018-04-04 DIAGNOSIS — I48.0 PAROXYSMAL ATRIAL FIBRILLATION (HCC): ICD-10-CM

## 2018-04-04 PROCEDURE — 94729 DIFFUSING CAPACITY: CPT | Performed by: INTERNAL MEDICINE

## 2018-04-04 PROCEDURE — 94760 N-INVAS EAR/PLS OXIMETRY 1: CPT

## 2018-04-04 PROCEDURE — 94060 EVALUATION OF WHEEZING: CPT

## 2018-04-04 PROCEDURE — 94010 BREATHING CAPACITY TEST: CPT | Performed by: INTERNAL MEDICINE

## 2018-04-04 PROCEDURE — 94729 DIFFUSING CAPACITY: CPT

## 2018-04-24 ENCOUNTER — TELEPHONE (OUTPATIENT)
Dept: CARDIOLOGY CLINIC | Facility: CLINIC | Age: 83
End: 2018-04-24

## 2018-04-24 NOTE — TELEPHONE ENCOUNTER
Holding her Eliquis would be up to the dentist involved  Some will extract on anticoagulation and some well not  I would have her see what the dentist requests  If so she can hold her Eliquis, at their request   Two day hold is usually sufficient    Thank

## 2018-04-24 NOTE — TELEPHONE ENCOUNTER
Patient's daughter called stating she was just at the dentist and has to have two teeth extracted  Patient is on  As of 4/24/2018 12:04 PM      Amiodarone HCl 100 mg Take 1 tablet daily   AmLODIPine Besylate 5 mg Oral   Apixaban 5 mg Oral 2 times daily   Atorvastatin Calcium 20 mg Oral   Coenzyme Q10 100 MG 1 capsule Oral Daily   Multiple Vitamins-Minerals 1 tablet Oral Daily  Omega-3 Fatty Acids 1000 MG Oral   Tramadol-Acetaminophen 37 5-325 mg 1 tablet Oral 4 times daily PRN     Patient would like to schedule appointment but is requesting guidance whether any medications need to be stopped and if so for how long       Please Advise

## 2018-04-24 NOTE — TELEPHONE ENCOUNTER
Spoke with daughter Macho Boston and informed her that Dr Merlin Coppola stated it would be up to the Dentist if Anticoagulation needed to be held  Macho Boston stated she felt it would be up to Cardiologist   If the dentist felt the patient needed to hold Eliquis then the patient could hold for two days

## 2018-05-17 ENCOUNTER — OFFICE VISIT (OUTPATIENT)
Dept: CARDIOLOGY CLINIC | Facility: CLINIC | Age: 83
End: 2018-05-17
Payer: MEDICARE

## 2018-05-17 VITALS — SYSTOLIC BLOOD PRESSURE: 140 MMHG | HEART RATE: 47 BPM | DIASTOLIC BLOOD PRESSURE: 70 MMHG

## 2018-05-17 DIAGNOSIS — I48.91 ATRIAL FIBRILLATION, UNSPECIFIED TYPE (HCC): Primary | ICD-10-CM

## 2018-05-17 PROCEDURE — 93000 ELECTROCARDIOGRAM COMPLETE: CPT

## 2018-05-17 PROCEDURE — 99211 OFF/OP EST MAY X REQ PHY/QHP: CPT

## 2018-05-17 NOTE — PROGRESS NOTES
Pt  Ruben Van in today complaining of dizziness, headache and unsteadiness on her feet  Pt  Thought she was in AFIB  EKG and BP was done  HR was low 47 and BP was 140/70  AS per Dr Timmy Hansen EKG showed bradycardia but no AFIB  Dr Timmy Hansen saw pt  And advised pt  To f/u with Dr Papito Rothman since they expressed to him some gastro intestinal sx's while in with him  Dr Timmy Hansen believes she will need a pacemaker in the near future  She will f/u with Dr Timmy Hansen on 6/21  Advised pt  To call if Sx's persist and/or worsen

## 2018-05-18 ENCOUNTER — LAB (OUTPATIENT)
Dept: LAB | Facility: HOSPITAL | Age: 83
End: 2018-05-18
Attending: INTERNAL MEDICINE
Payer: MEDICARE

## 2018-05-18 ENCOUNTER — OFFICE VISIT (OUTPATIENT)
Dept: FAMILY MEDICINE CLINIC | Facility: HOSPITAL | Age: 83
End: 2018-05-18
Payer: MEDICARE

## 2018-05-18 VITALS
BODY MASS INDEX: 19.89 KG/M2 | SYSTOLIC BLOOD PRESSURE: 120 MMHG | HEIGHT: 64 IN | WEIGHT: 116.5 LBS | DIASTOLIC BLOOD PRESSURE: 72 MMHG

## 2018-05-18 DIAGNOSIS — R11.2 NAUSEA AND VOMITING, INTRACTABILITY OF VOMITING NOT SPECIFIED, UNSPECIFIED VOMITING TYPE: ICD-10-CM

## 2018-05-18 DIAGNOSIS — Z95.1 HX OF CABG: Primary | ICD-10-CM

## 2018-05-18 DIAGNOSIS — I48.0 PAROXYSMAL ATRIAL FIBRILLATION (HCC): ICD-10-CM

## 2018-05-18 PROBLEM — R00.1 BRADYCARDIA: Status: ACTIVE | Noted: 2018-05-18

## 2018-05-18 LAB
ALBUMIN SERPL BCP-MCNC: 3.8 G/DL (ref 3.5–5)
ALP SERPL-CCNC: 70 U/L (ref 46–116)
ALT SERPL W P-5'-P-CCNC: 26 U/L (ref 12–78)
ANION GAP SERPL CALCULATED.3IONS-SCNC: 7 MMOL/L (ref 4–13)
AST SERPL W P-5'-P-CCNC: 20 U/L (ref 5–45)
BILIRUB SERPL-MCNC: 0.5 MG/DL (ref 0.2–1)
BUN SERPL-MCNC: 20 MG/DL (ref 5–25)
CALCIUM SERPL-MCNC: 9.1 MG/DL (ref 8.3–10.1)
CHLORIDE SERPL-SCNC: 104 MMOL/L (ref 100–108)
CO2 SERPL-SCNC: 32 MMOL/L (ref 21–32)
CREAT SERPL-MCNC: 0.96 MG/DL (ref 0.6–1.3)
ERYTHROCYTE [DISTWIDTH] IN BLOOD BY AUTOMATED COUNT: 14.4 % (ref 11.6–15.1)
GFR SERPL CREATININE-BSD FRML MDRD: 53 ML/MIN/1.73SQ M
GLUCOSE SERPL-MCNC: 79 MG/DL (ref 65–140)
HCT VFR BLD AUTO: 40.5 % (ref 34.8–46.1)
HGB BLD-MCNC: 13.4 G/DL (ref 11.5–15.4)
LIPASE SERPL-CCNC: 119 U/L (ref 73–393)
MCH RBC QN AUTO: 31.2 PG (ref 26.8–34.3)
MCHC RBC AUTO-ENTMCNC: 33.1 G/DL (ref 31.4–37.4)
MCV RBC AUTO: 94 FL (ref 82–98)
PLATELET # BLD AUTO: 163 THOUSANDS/UL (ref 149–390)
PMV BLD AUTO: 11.3 FL (ref 8.9–12.7)
POTASSIUM SERPL-SCNC: 3.4 MMOL/L (ref 3.5–5.3)
PROT SERPL-MCNC: 7.5 G/DL (ref 6.4–8.2)
RBC # BLD AUTO: 4.29 MILLION/UL (ref 3.81–5.12)
SODIUM SERPL-SCNC: 143 MMOL/L (ref 136–145)
WBC # BLD AUTO: 8.3 THOUSAND/UL (ref 4.31–10.16)

## 2018-05-18 PROCEDURE — 36415 COLL VENOUS BLD VENIPUNCTURE: CPT

## 2018-05-18 PROCEDURE — 85027 COMPLETE CBC AUTOMATED: CPT

## 2018-05-18 PROCEDURE — 83690 ASSAY OF LIPASE: CPT

## 2018-05-18 PROCEDURE — 80053 COMPREHEN METABOLIC PANEL: CPT

## 2018-05-18 PROCEDURE — 99214 OFFICE O/P EST MOD 30 MIN: CPT | Performed by: INTERNAL MEDICINE

## 2018-05-18 NOTE — PROGRESS NOTES
Assessment/Plan:       Diagnoses and all orders for this visit:    Hx of CABG    Paroxysmal atrial fibrillation (HCC)    Nausea and vomiting, intractability of vomiting not specified, unspecified vomiting type  Comments:  NO VOMITTING          All of the above diagnoses have been assessed  Additional COMMENTS/PLAN:   I think the differential is either viral illness her per half a exacerbation of her gallstone problem  Will get blood work as well as ultrasound of the gallbladder  She clearly is better today  Subjective:      Patient ID: Thee Sherman is a 80 y o  female  HPI   Patient woke up on Monday morning with nausea  Has does have a history of gallstones  Has not vomited  Had a lot of belching  Seems to feel little bit better this morning  Became more lightheaded and dizzy 1 more nauseated  No change in bowel habits  Did feel cold-but no document fever or chills  The following portions of the patient's history were revised and updated as appropriate: Problem list, allergies, med list, FH, SH, Past medical and surgical histories  Current Outpatient Prescriptions   Medication Sig Dispense Refill    amiodarone 100 mg tablet Take 1 tablet daily 90 tablet 3    amLODIPine (NORVASC) 5 mg tablet Take by mouth      apixaban (ELIQUIS) 5 mg Take 1 tablet (5 mg total) by mouth 2 (two) times a day 70 tablet 0    atorvastatin (LIPITOR) 20 mg tablet Take by mouth      clotrimazole-betamethasone (LOTRISONE) 1-0 05 % cream Apply topically 2 (two) times a day 30 g 2    Coenzyme Q10 (COQ10) 100 MG CAPS Take 1 capsule by mouth Daily      conjugated estrogens (PREMARIN) vaginal cream Apply a pea size amount of the estrogen cream to the opening of the vagina three nights per week        Multiple Vitamins-Minerals (OCUVITE EXTRA) TABS Take 1 tablet by mouth daily      Omega-3 1000 MG CAPS Take by mouth      traMADol-acetaminophen (ULTRACET) 37 5-325 mg per tablet Take 1 tablet by mouth 4 (four) times a day as needed for moderate pain 120 tablet 0     No current facility-administered medications for this visit  Review of Systems   Constitutional: Negative  Respiratory: Negative  Cardiovascular: Negative  Gastrointestinal: Negative  Genitourinary: Negative  Objective:    /60 (BP Location: Left arm, Patient Position: Sitting, Cuff Size: Standard)   Ht 5' 4" (1 626 m)   Wt 52 8 kg (116 lb 8 oz)   BMI 20 00 kg/m²      Physical Exam   Constitutional: She appears well-developed and well-nourished  Neck: No JVD present  Cardiovascular: Normal rate and regular rhythm  Murmur heard  Pulmonary/Chest: Effort normal and breath sounds normal    Abdominal: Soft  Bowel sounds are normal  There is no tenderness  Musculoskeletal: She exhibits no edema  No visits with results within 2 Week(s) from this visit     Latest known visit with results is:   Appointment on 03/10/2018   Component Date Value Ref Range Status    WBC 03/10/2018 7 08  4 31 - 10 16 Thousand/uL Final    RBC 03/10/2018 4 47  3 81 - 5 12 Million/uL Final    Hemoglobin 03/10/2018 13 3  11 5 - 15 4 g/dL Final    Hematocrit 03/10/2018 41 7  34 8 - 46 1 % Final    MCV 03/10/2018 93  82 - 98 fL Final    MCH 03/10/2018 29 8  26 8 - 34 3 pg Final    MCHC 03/10/2018 31 9  31 4 - 37 4 g/dL Final    RDW 03/10/2018 14 5  11 6 - 15 1 % Final    MPV 03/10/2018 11 4  8 9 - 12 7 fL Final    Platelets 79/87/0004 144* 149 - 390 Thousands/uL Final    Neutrophils Relative 03/10/2018 57  43 - 75 % Final    Lymphocytes Relative 03/10/2018 29  14 - 44 % Final    Monocytes Relative 03/10/2018 13* 4 - 12 % Final    Eosinophils Relative 03/10/2018 1  0 - 6 % Final    Basophils Relative 03/10/2018 0  0 - 1 % Final    Neutrophils Absolute 03/10/2018 4 01  1 85 - 7 62 Thousands/µL Final    Lymphocytes Absolute 03/10/2018 2 04  0 60 - 4 47 Thousands/µL Final    Monocytes Absolute 03/10/2018 0 91  0 17 - 1 22 Thousand/µL Final    Eosinophils Absolute 03/10/2018 0 09  0 00 - 0 61 Thousand/µL Final    Basophils Absolute 03/10/2018 0 03  0 00 - 0 10 Thousands/µL Final    Sodium 03/10/2018 141  136 - 145 mmol/L Final    Potassium 03/10/2018 3 7  3 5 - 5 3 mmol/L Final    Chloride 03/10/2018 103  100 - 108 mmol/L Final    CO2 03/10/2018 30  21 - 32 mmol/L Final    Anion Gap 03/10/2018 8  4 - 13 mmol/L Final    BUN 03/10/2018 21  5 - 25 mg/dL Final    Creatinine 03/10/2018 0 97  0 60 - 1 30 mg/dL Final    Standardized to IDMS reference method    Glucose, Fasting 03/10/2018 94  65 - 99 mg/dL Final      Specimen collection should occur prior to Sulfasalazine administration due to the potential for falsely depressed results  Specimen collection should occur prior to Sulfapyridine administration due to the potential for falsely elevated results   Calcium 03/10/2018 8 5  8 3 - 10 1 mg/dL Final    AST 03/10/2018 31  5 - 45 U/L Final      Specimen collection should occur prior to Sulfasalazine administration due to the potential for falsely depressed results   ALT 03/10/2018 51  12 - 78 U/L Final      Specimen collection should occur prior to Sulfasalazine administration due to the potential for falsely depressed results       Alkaline Phosphatase 03/10/2018 69  46 - 116 U/L Final    Total Protein 03/10/2018 7 3  6 4 - 8 2 g/dL Final    Albumin 03/10/2018 3 8  3 5 - 5 0 g/dL Final    Total Bilirubin 03/10/2018 0 70  0 20 - 1 00 mg/dL Final    eGFR 03/10/2018 52  ml/min/1 73sq m Final    TSH 3RD GENERATON 03/10/2018 2 858  0 358 - 3 740 uIU/mL Final    Cholesterol 03/10/2018 164  50 - 200 mg/dL Final      Cholesterol:       Desirable         <200 mg/dl       Borderline         200-239 mg/dl       High              >239           Triglycerides 03/10/2018 46  <=150 mg/dL Final    Specimen collection should occur prior to N-Acetylcysteine or Metamizole administration due to the potential for falsely depressed results   HDL, Direct 03/10/2018 74* 40 - 60 mg/dL Final      HDL Cholesterol:       High    >59 mg/dL       Low     <41 mg/dL  HDL Cholesterol:       High    >59 mg/dL       Low     <41 mg/dL    LDL Calculated 03/10/2018 81  0 - 100 mg/dL Final      This screening LDL is a calculated result  It does not have the accuracy of the Direct Measured LDL in the monitoring of patients with hyperlipidemia and/or statin therapy  Direct Measure LDL (BDL990) must be ordered separately in these patients      Magnesium 03/10/2018 2 1  1 6 - 2 6 mg/dL Final         Tyrone Brush MD

## 2018-05-25 ENCOUNTER — HOSPITAL ENCOUNTER (OUTPATIENT)
Dept: ULTRASOUND IMAGING | Facility: HOSPITAL | Age: 83
Discharge: HOME | End: 2018-05-25
Attending: INTERNAL MEDICINE
Payer: MEDICARE

## 2018-05-25 DIAGNOSIS — R11.2 NAUSEA AND VOMITING, INTRACTABILITY OF VOMITING NOT SPECIFIED, UNSPECIFIED VOMITING TYPE: ICD-10-CM

## 2018-05-25 PROCEDURE — 76705 ECHO EXAM OF ABDOMEN: CPT

## 2018-06-21 ENCOUNTER — OFFICE VISIT (OUTPATIENT)
Dept: CARDIOLOGY CLINIC | Facility: CLINIC | Age: 83
End: 2018-06-21
Payer: MEDICARE

## 2018-06-21 VITALS
WEIGHT: 117 LBS | BODY MASS INDEX: 19.97 KG/M2 | SYSTOLIC BLOOD PRESSURE: 128 MMHG | HEIGHT: 64 IN | HEART RATE: 44 BPM | DIASTOLIC BLOOD PRESSURE: 62 MMHG

## 2018-06-21 DIAGNOSIS — I25.10 CORONARY ARTERY DISEASE INVOLVING NATIVE CORONARY ARTERY OF NATIVE HEART WITHOUT ANGINA PECTORIS: ICD-10-CM

## 2018-06-21 DIAGNOSIS — E78.1 PURE HYPERGLYCERIDEMIA: ICD-10-CM

## 2018-06-21 DIAGNOSIS — I10 ESSENTIAL HYPERTENSION: ICD-10-CM

## 2018-06-21 DIAGNOSIS — R00.1 BRADYCARDIA: ICD-10-CM

## 2018-06-21 DIAGNOSIS — I48.0 PAROXYSMAL ATRIAL FIBRILLATION (HCC): Primary | ICD-10-CM

## 2018-06-21 DIAGNOSIS — Z95.2 HISTORY OF AORTIC VALVE REPLACEMENT: ICD-10-CM

## 2018-06-21 DIAGNOSIS — Z95.1 HX OF CABG: ICD-10-CM

## 2018-06-21 PROCEDURE — 99214 OFFICE O/P EST MOD 30 MIN: CPT | Performed by: INTERNAL MEDICINE

## 2018-06-21 PROCEDURE — 93000 ELECTROCARDIOGRAM COMPLETE: CPT | Performed by: INTERNAL MEDICINE

## 2018-06-21 NOTE — PROGRESS NOTES
Cardiology Follow Up    Nyasia Clifford  7/30/1929  [id number]  HEART & VASCULAR  [facility] Darin Mao  [address]  [address] [address] [address]    1  Paroxysmal atrial fibrillation (HCC)  POCT ECG   2  Bradycardia     3  Coronary artery disease involving native coronary artery of native heart without angina pectoris     4  Hx of CABG     5  Pure hyperglyceridemia     6  History of aortic valve replacement     7  Essential hypertension         Interval History:     Mrs Anastasia Garg comes in for follow-up given her history of paroxysmal and recurrent atrial fibrillation, along with her prior history of CAD and valvular heart disease  an another acute visit secondary to symptoms of palpitations and feeling as if she is going back into atrial fibrillation  She follows with Dr Baljit Ellsion of our practice given her history of CAD and aortic valve disease  She is status post CABG in 2005 after being found to have multivessel disease, but then had progression of aortic valve disease and had a bioprosthetic aortic valve replaced in 2012  She had been on Multaq antiarrhythmic therapy, but seemed to be having breakthrough symptoms around our last appointment and was going to lose insurance coverage of this  She has had an intolerance to beta-blocker in the past likely due to bradycardia  Also, she was once on Coumadin but she tells me she requested coming off due to some side effects and had been on aspirin since  Her echocardiograms through the years of show normal LV systolic function and a normally functioning bioprosthetic aortic valve replacement  A few visits ago Maria Ines Suarez had an acceleration of symptoms that led to an emergency room visit  She was in sinus rhythm but had frequent ectopy  In follow-up we changed her from Multaq to amiodarone  We also initiated Eliquis for stroke prevention    When she initially switched to amiodarone, doing twice a day for 2 weeks and then daily, her symptoms subsided       Siddharth Stanley has been in and out of atrial fibrillation over the last few visits  She came in for an ECG a few months back in which she was in a junctional rhythm and therefore we backed off of her amiodarone to 100 mg daily  Last month she was having some lightheadedness associated with gastrointestinal complaints  ECG showed sinus bradycardia in the high 40s  Since then, and after gastrointestinal symptoms resolved, she has not had any cardiovascular symptoms  She denies any lightheadedness  No near-syncope or syncope  Overall she feels well  She is still active and goes above and beyond her activities of daily living  She denies chest pain or shortness of breath  Other than some mild lower extremity edema, she denies any other signs or symptoms of CHF          Patient Active Problem List   Diagnosis    Hx of CABG    Aortic valve disorder    Coronary artery disease    Essential hypertension    Hyperlipidemia    Paroxysmal atrial fibrillation (HCC)    History of aortic valve replacement    Acute cystitis without hematuria    Arteriosclerosis of carotid artery    Cervical spine arthritis ([facility]    Cholelithiasis without cholecystitis    Chronic cervical radiculopathy    Cystitis, chronic    Dystrophy of vulva    Mixed stress and urge urinary incontinence    Neural foraminal stenosis of cervical spine    Occipital neuralgia of right side    Osteoarthritis    Peripheral neuropathy    Bradycardia     Past Medical History:   Diagnosis Date    Acute myocardial infarction ([facility]     Atrial fibrillation ([facility]     CAD (coronary artery disease)     Cellulitis      AND ABSCESS    Cholecystitis     Closed Colles' fracture     OF THE LEFT WRIST; LAST ASSESSED: 5/2/14    Closed fracture of radius     LAST ASSESSED: 2/19/14    Dyspnea     Hair loss disorder     Hematuria     History of being hospitalized     1/5/12-1/14/12 Juan Diego MACIEL PROCEDURES: 1) REDO STERNOTOMY AND AORTIC VALVE REPLACEMENT WITH A 19-MM REBA-PETERS BOVINIE PERICARDIAL MAGNA EASE VALVE 2) TRANSESOPHAGEAL ECHOCARDIOGRAM     Hypercholesterolemia     Hypertension     Incomplete bladder emptying     Leg wound, left     Malaise and fatigue     Sick sinus syndrome [facility])      Social History     Social History    Marital status:      Spouse name: N/A    Number of children: N/A    Years of education: N/A     Occupational History    RETIRED      Social History Main Topics    Smoking status: Former Smoker    Smokeless tobacco: Never Used      Comment: QUIT 36 YEARS AGO ALSO NEVER A SMOKER AS PER [employer]    Alcohol use Yes      Comment: social    Drug use: No    Sexual activity: Not on file     Other Topics Concern    Not on file     Social History Narrative    Lives with son  Feels safe  Has living will      ACTIVE ADVANCE DIRECTIVE    CAFFEINE USE: 1 1/2 CUPS COFFEE QD    EXERCISE HABITS    GOOD DENTAL HYGIENE    LIVING SITUATION: SUPPORTIVE AND SAFE          Family History   Problem Relation Age of Onset    Diabetes Mother         MELLITUS    Hypertension Mother     Heart disease Mother     Prostate cancer Father     Dementia Brother     Heart disease Maternal Grandmother     Hypertension Maternal Grandmother     Heart disease Maternal Grandfather     Hypertension Family     Osteoporosis Family     Substance Abuse Neg Hx     Mental illness Neg Hx      Past Surgical History:   Procedure Laterality Date    AORTIC VALVE REPLACEMENT  01/2012    HEART VALVE    CARDIAC SURGERY      CATARACT EXTRACTION W/  INTRAOCULAR LENS IMPLANT  08/26/2010    Kervin Cam MD; PHACOEMULISIFICATION; INSERTION INTRAOCULAR LENS OD    CORONARY ARTERY BYPASS GRAFT  01/2005       Current Outpatient Prescriptions:     amiodarone 100 mg tablet, Take 1 tablet daily, Disp: 90 tablet, Rfl: 3    amLODIPine (NORVASC) 5 mg tablet, Take by mouth, Disp: , Rfl:     apixaban (ELIQUIS) 5 mg, Take 1 tablet (5 mg total) by mouth 2 (two) times a day, Disp: 70 tablet, Rfl: 0    atorvastatin (LIPITOR) 20 mg tablet, Take by mouth, Disp: , Rfl:     clotrimazole-betamethasone (LOTRISONE) 1-0 05 % cream, Apply topically 2 (two) times a day, Disp: 30 g, Rfl: 2    Coenzyme Q10 (COQ10) 100 MG CAPS, Take 1 capsule by mouth Daily, Disp: , Rfl:     conjugated estrogens (PREMARIN) vaginal cream, Apply a pea size amount of the estrogen cream to the opening of the vagina three nights per week , Disp: , Rfl:     Multiple Vitamins-Minerals (OCUVITE EXTRA) TABS, Take 1 tablet by mouth daily, Disp: , Rfl:     Omega-3 1000 MG CAPS, Take by mouth, Disp: , Rfl:     traMADol-acetaminophen (ULTRACET) 37 5-325 mg per tablet, Take 1 tablet by mouth 4 (four) times a day as needed for moderate pain, Disp: 120 tablet, Rfl: 0  Allergies   Allergen Reactions    Heparin      Annotation - [id number]: LOW PLATELETS    Phenazopyridine        Labs:  Lab Results   Component Value Date     05/18/2018     08/04/2015    K 3 4 (L) 05/18/2018    K 3 6 08/04/2015     05/18/2018     08/04/2015    CO2 32 05/18/2018    CO2 26 4 08/04/2015    BUN 20 05/18/2018    BUN 19 08/04/2015    CREATININE 0 96 05/18/2018    CREATININE 1 19 08/04/2015    GLUCOSE 79 05/18/2018    GLUCOSE 92 08/04/2015    CALCIUM 9 1 05/18/2018    CALCIUM 8 6 08/04/2015     Imaging:   ECG demonstrates marked sinus bradycardia with nonspecific ST and T-wave changes      ECHO (12/17):  LEFT VENTRICLE:  Size was normal   Systolic function was normal  Ejection fraction was estimated to be 65 %    Wall thickness was at the upper limits of normal   Features were consistent with a pseudonormal left ventricular filling pattern, with concomitant abnormal relaxation and increased filling pressure (grade 2 diastolic dysfunction)      RIGHT VENTRICLE:  The size was normal   Systolic function was normal      LEFT ATRIUM:  The atrium was mildly dilated      RIGHT ATRIUM:  The atrium was mildly dilated      MITRAL VALVE:  There was mild regurgitation      AORTIC VALVE:  A bioprosthesis was present  There was mild stenosis  There was trace regurgitation      TRICUSPID VALVE:  There was mild regurgitation      COMPARISONS:  There has been no significant interval change  Comparison was made with the previous study of 11-Jan-2017  Review of Systems:  Review of Systems   Constitutional: Positive for fatigue  HENT: Negative  Eyes: Negative  Respiratory: Negative  Cardiovascular: Positive for leg swelling  Gastrointestinal: Negative  Musculoskeletal: Negative  Skin: Negative  Allergic/Immunologic: Negative  Neurological: Positive for weakness  Hematological: Negative  Psychiatric/Behavioral: Negative  All other systems reviewed and are negative  Physical Exam:  Physical Exam   Constitutional: She is oriented to person, place, and time  She appears well-developed and well-nourished  HENT:   Head: Normocephalic and atraumatic  Eyes: EOM are normal  Pupils are equal, round, and reactive to light  Right eye exhibits no discharge  Left eye exhibits no discharge  No scleral icterus  Neck: Normal range of motion  Neck supple  No JVD present  No thyromegaly present  Cardiovascular: Normal rate, S1 normal, S2 normal, intact distal pulses and normal pulses  An irregularly irregular rhythm present  PMI is not displaced  Exam reveals no gallop and no friction rub  Murmur heard  Crescendo decrescendo systolic murmur is present with a grade of 3/6   Pulmonary/Chest: Effort normal and breath sounds normal  No respiratory distress  She has no wheezes  She has no rales  Abdominal: Soft  Bowel sounds are normal  She exhibits no distension  There is no tenderness  Musculoskeletal: Normal range of motion  She exhibits edema  She exhibits no tenderness or deformity  Right lower leg: She exhibits edema  Left lower leg: She exhibits edema  Neurological: She is alert and oriented to person, place, and time  No cranial nerve deficit  Skin: Skin is warm and dry  No rash noted  Psychiatric: She has a normal mood and affect  Judgment and thought content normal    Nursing note and vitals reviewed  Discussion/Summary:    1  JESSICA Shaver remains in a sinus bradycardia  She is asymptomatic  She will remain on low-dose amiodarone  I did explain to her and her daughter that he permanent pacemaker is in her future  I have asked him to call if any signs of lightheadedness or presyncope occur  She should continue to check her pulse and blood pressure regularly  We will see her back in 3 months  She will remain on Eliquis for stroke prevention  2  RONDA Shaver had prior CABG in 2005  She is without symptoms of angina  No other changes were made to her medical therapy  3  Bioprosthetic aortic valve replacement -  Functioning normal by echocardiogram in December  No changes made today  She knows to take antibiotic prophylaxis for any dental procedures  4  HTN - Her Blood pressure is for the most part controlled  She should periodically check her blood pressure at home  Counseling / Coordination of Care  Total floor / unit time spent today 25 minutes  Greater than 50% of total time was spent with the patient and / or family counseling and / or coordination of care

## 2018-06-26 ENCOUNTER — OFFICE VISIT (OUTPATIENT)
Dept: FAMILY MEDICINE CLINIC | Facility: HOSPITAL | Age: 83
End: 2018-06-26
Payer: MEDICARE

## 2018-06-26 VITALS
WEIGHT: 115 LBS | SYSTOLIC BLOOD PRESSURE: 142 MMHG | BODY MASS INDEX: 19.63 KG/M2 | HEART RATE: 52 BPM | HEIGHT: 64 IN | DIASTOLIC BLOOD PRESSURE: 70 MMHG

## 2018-06-26 DIAGNOSIS — K80.20 CHOLELITHIASIS WITHOUT CHOLECYSTITIS: ICD-10-CM

## 2018-06-26 DIAGNOSIS — R00.1 BRADYCARDIA: ICD-10-CM

## 2018-06-26 DIAGNOSIS — E78.1 PURE HYPERGLYCERIDEMIA: ICD-10-CM

## 2018-06-26 DIAGNOSIS — I10 ESSENTIAL HYPERTENSION: Primary | ICD-10-CM

## 2018-06-26 DIAGNOSIS — I35.9 AORTIC VALVE DISORDER: ICD-10-CM

## 2018-06-26 DIAGNOSIS — Z95.2 HISTORY OF AORTIC VALVE REPLACEMENT: ICD-10-CM

## 2018-06-26 DIAGNOSIS — M47.812 CERVICAL ARTHRITIS: ICD-10-CM

## 2018-06-26 DIAGNOSIS — K21.00 GASTROESOPHAGEAL REFLUX DISEASE WITH ESOPHAGITIS: ICD-10-CM

## 2018-06-26 DIAGNOSIS — I25.10 CORONARY ARTERY DISEASE INVOLVING NATIVE CORONARY ARTERY OF NATIVE HEART WITHOUT ANGINA PECTORIS: ICD-10-CM

## 2018-06-26 DIAGNOSIS — I48.0 PAROXYSMAL ATRIAL FIBRILLATION (HCC): ICD-10-CM

## 2018-06-26 PROBLEM — N30.00 ACUTE CYSTITIS WITHOUT HEMATURIA: Status: RESOLVED | Noted: 2018-02-27 | Resolved: 2018-06-26

## 2018-06-26 PROCEDURE — 99214 OFFICE O/P EST MOD 30 MIN: CPT | Performed by: INTERNAL MEDICINE

## 2018-06-26 RX ORDER — AMLODIPINE BESYLATE 5 MG/1
5 TABLET ORAL DAILY
Qty: 30 TABLET | Refills: 11 | Status: SHIPPED | OUTPATIENT
Start: 2018-06-26 | End: 2018-07-09 | Stop reason: SDUPTHER

## 2018-06-26 RX ORDER — POTASSIUM CHLORIDE 750 MG/1
10 TABLET, EXTENDED RELEASE ORAL DAILY
Qty: 30 TABLET | Refills: 5 | Status: SHIPPED | OUTPATIENT
Start: 2018-06-26 | End: 2019-04-02 | Stop reason: ALTCHOICE

## 2018-06-26 NOTE — ASSESSMENT & PLAN NOTE
Had slow rates when seen by / Carl- discussed possible pacer- pt denies lightheadedness or dizziness

## 2018-06-26 NOTE — PROGRESS NOTES
Assessment/Plan:         Problem List Items Addressed This Visit        Digestive    Cholelithiasis without cholecystitis     No symptoms noted recently  Discussed with pt and tone symptoms of ruq or pain between shoulder blades         Gastroesophageal reflux disease with esophagitis     Improved after course of otc nexxium- if recurrent  Symptoms- will have gi see pt and consider for egd            Cardiovascular and Mediastinum    Aortic valve disorder    Relevant Medications    amLODIPine (NORVASC) 5 mg tablet    Coronary artery disease    Relevant Medications    amLODIPine (NORVASC) 5 mg tablet    Essential hypertension - Primary     Well controlled         Relevant Medications    amLODIPine (NORVASC) 5 mg tablet    potassium chloride (K-DUR,KLOR-CON) 10 mEq tablet    Other Relevant Orders    CBC and differential    Comprehensive metabolic panel    Paroxysmal atrial fibrillation ([facility] )     Had slow rates when seen by / Carl- discussed possible pacer- pt denies lightheadedness or dizziness  Relevant Medications    amLODIPine (NORVASC) 5 mg tablet       Other    Hyperlipidemia     Will have pt have repeat levels in september         Relevant Orders    Lipid Panel with Direct LDL reflex    History of aortic valve replacement    Bradycardia      Other Visit Diagnoses     Cervical arthritis ([facility] )        Relevant Medications    traMADol-acetaminophen (ULTRACET) 37 5-325 mg per tablet            Subjective:      Patient ID: Shai Ribeiro is a 80 y o  female    1  Cardiac- no recent chest pain  2 gi- never had egd- improved symptoms        The following portions of the patient's history were reviewed and updated as appropriate: allergies, current medications and problem list      Review of Systems   Constitutional: Negative for activity change and fever  Playing tennis- no symptoms   HENT: Positive for congestion  All other systems reviewed and are negative          Objective:      Current Outpatient Prescriptions:     amiodarone 100 mg tablet, Take 1 tablet daily, Disp: 90 tablet, Rfl: 3    amLODIPine (NORVASC) 5 mg tablet, Take 1 tablet (5 mg total) by mouth daily, Disp: 30 tablet, Rfl: 11    apixaban (ELIQUIS) 5 mg, Take 1 tablet (5 mg total) by mouth 2 (two) times a day, Disp: 70 tablet, Rfl: 0    atorvastatin (LIPITOR) 20 mg tablet, Take by mouth, Disp: , Rfl:     clotrimazole-betamethasone (LOTRISONE) 1-0 05 % cream, Apply topically 2 (two) times a day, Disp: 30 g, Rfl: 2    Coenzyme Q10 (COQ10) 100 MG CAPS, Take 1 capsule by mouth Daily, Disp: , Rfl:     conjugated estrogens (PREMARIN) vaginal cream, Apply a pea size amount of the estrogen cream to the opening of the vagina three nights per week , Disp: , Rfl:     Multiple Vitamins-Minerals (OCUVITE EXTRA) TABS, Take 1 tablet by mouth daily, Disp: , Rfl:     Omega-3 1000 MG CAPS, Take by mouth, Disp: , Rfl:     traMADol-acetaminophen (ULTRACET) 37 5-325 mg per tablet, Take 1 tablet by mouth 4 (four) times a day as needed for moderate pain, Disp: 120 tablet, Rfl: 0    potassium chloride (K-DUR,KLOR-CON) 10 mEq tablet, Take 1 tablet (10 mEq total) by mouth daily, Disp: 30 tablet, Rfl: 5         Physical Exam   Constitutional: She is oriented to person, place, and time  She appears well-developed and well-nourished  HENT:   Head: Normocephalic  Right Ear: External ear normal    Left Ear: External ear normal    Mouth/Throat: Oropharynx is clear and moist    Eyes: EOM are normal  Pupils are equal, round, and reactive to light  Right eye exhibits no discharge  Left eye exhibits no discharge  Neck: Neck supple  No tracheal deviation present  No thyromegaly present  Cardiovascular: Normal rate  Murmur heard  irreg irreg- 60's   Pulmonary/Chest: Effort normal and breath sounds normal  No respiratory distress  She has no wheezes  Abdominal: Soft  Bowel sounds are normal  She exhibits no distension  There is no tenderness   There is no guarding  Musculoskeletal: She exhibits no edema  Neurological: She is oriented to person, place, and time  No cranial nerve deficit  Skin: Skin is warm  No erythema  Increased pigmentation   Psychiatric: She has a normal mood and affect  Her behavior is normal    Nursing note and vitals reviewed

## 2018-06-26 NOTE — ASSESSMENT & PLAN NOTE
No symptoms noted recently   Discussed with pt and tone symptoms of ruq or pain between shoulder blades

## 2018-06-26 NOTE — ASSESSMENT & PLAN NOTE
Improved after course of otc nexxium- if recurrent  Symptoms- will have gi see pt and consider for egd

## 2018-06-29 DIAGNOSIS — M47.812 CERVICAL ARTHRITIS: ICD-10-CM

## 2018-07-02 DIAGNOSIS — R35.0 URINARY FREQUENCY: Primary | ICD-10-CM

## 2018-07-03 ENCOUNTER — APPOINTMENT (OUTPATIENT)
Dept: LAB | Facility: HOSPITAL | Age: 83
End: 2018-07-03
Attending: INTERNAL MEDICINE
Payer: MEDICARE

## 2018-07-03 DIAGNOSIS — R35.0 URINARY FREQUENCY: ICD-10-CM

## 2018-07-03 PROCEDURE — 87086 URINE CULTURE/COLONY COUNT: CPT

## 2018-07-03 PROCEDURE — 87186 SC STD MICRODIL/AGAR DIL: CPT

## 2018-07-03 PROCEDURE — 87077 CULTURE AEROBIC IDENTIFY: CPT

## 2018-07-05 LAB — BACTERIA UR CULT: ABNORMAL

## 2018-07-09 DIAGNOSIS — E78.5 HYPERLIPIDEMIA, UNSPECIFIED HYPERLIPIDEMIA TYPE: Primary | ICD-10-CM

## 2018-07-09 DIAGNOSIS — I10 ESSENTIAL HYPERTENSION: ICD-10-CM

## 2018-07-10 DIAGNOSIS — E78.2 MIXED HYPERLIPIDEMIA: Primary | ICD-10-CM

## 2018-07-10 RX ORDER — ATORVASTATIN CALCIUM 20 MG/1
TABLET, FILM COATED ORAL
Qty: 30 TABLET | Refills: 5 | Status: SHIPPED | OUTPATIENT
Start: 2018-07-10 | End: 2018-09-20 | Stop reason: SDUPTHER

## 2018-07-10 RX ORDER — ATORVASTATIN CALCIUM 20 MG/1
20 TABLET, FILM COATED ORAL DAILY
Qty: 30 TABLET | Refills: 3 | Status: SHIPPED | OUTPATIENT
Start: 2018-07-10 | End: 2019-02-19

## 2018-07-10 RX ORDER — AMLODIPINE BESYLATE 5 MG/1
5 TABLET ORAL DAILY
Qty: 30 TABLET | Refills: 3 | Status: SHIPPED | OUTPATIENT
Start: 2018-07-10 | End: 2018-12-27 | Stop reason: SDUPTHER

## 2018-07-16 ENCOUNTER — TELEPHONE (OUTPATIENT)
Dept: FAMILY MEDICINE CLINIC | Facility: HOSPITAL | Age: 83
End: 2018-07-16

## 2018-07-16 ENCOUNTER — APPOINTMENT (OUTPATIENT)
Dept: LAB | Facility: HOSPITAL | Age: 83
End: 2018-07-16
Attending: INTERNAL MEDICINE
Payer: MEDICARE

## 2018-07-16 DIAGNOSIS — N30.00 ACUTE CYSTITIS WITHOUT HEMATURIA: Primary | ICD-10-CM

## 2018-07-16 DIAGNOSIS — R39.15 URINARY URGENCY: ICD-10-CM

## 2018-07-16 DIAGNOSIS — R35.0 URINARY FREQUENCY: Primary | ICD-10-CM

## 2018-07-16 LAB
BACTERIA UR QL AUTO: ABNORMAL /HPF
BILIRUB UR QL STRIP: NEGATIVE
CLARITY UR: CLEAR
COLOR UR: YELLOW
GLUCOSE UR STRIP-MCNC: NEGATIVE MG/DL
HGB UR QL STRIP.AUTO: ABNORMAL
KETONES UR STRIP-MCNC: NEGATIVE MG/DL
LEUKOCYTE ESTERASE UR QL STRIP: ABNORMAL
MUCOUS THREADS UR QL AUTO: ABNORMAL
NITRITE UR QL STRIP: POSITIVE
NON-SQ EPI CELLS URNS QL MICRO: ABNORMAL /HPF
PH UR STRIP.AUTO: 6 [PH] (ref 4.5–8)
PROT UR STRIP-MCNC: NEGATIVE MG/DL
RBC #/AREA URNS AUTO: ABNORMAL /HPF
SP GR UR STRIP.AUTO: <=1.005 (ref 1–1.03)
UROBILINOGEN UR QL STRIP.AUTO: 0.2 E.U./DL
WBC #/AREA URNS AUTO: ABNORMAL /HPF

## 2018-07-16 PROCEDURE — 87086 URINE CULTURE/COLONY COUNT: CPT

## 2018-07-16 PROCEDURE — 87186 SC STD MICRODIL/AGAR DIL: CPT

## 2018-07-16 PROCEDURE — 87077 CULTURE AEROBIC IDENTIFY: CPT

## 2018-07-16 PROCEDURE — 81001 URINALYSIS AUTO W/SCOPE: CPT

## 2018-07-16 RX ORDER — SULFAMETHOXAZOLE AND TRIMETHOPRIM 800; 160 MG/1; MG/1
1 TABLET ORAL 2 TIMES DAILY
Qty: 14 TABLET | Refills: 0 | Status: SHIPPED | OUTPATIENT
Start: 2018-07-16 | End: 2018-07-23

## 2018-07-16 NOTE — PROGRESS NOTES
Telephone Call Documentation    Renée Sanchez       7/30/1929            Insurance:    Pharmacy on File:    Reason for Call:  Urine dipstick shows significant white cells-she had just finished a course of cephalexin for her prior culture positive UTI which was sensitive to    She is having symptoms and therefore will switch to Bactrim DS 1 p o  b i d  for 7 days if she is still having symptomatology will need to have her seen by Urology    Symptoms:    Onset:      Med Change/ Regimen Change:  Bactrim DS

## 2018-07-16 NOTE — TELEPHONE ENCOUNTER
Dr paul---felipe---    I talked to lexie---pt has a sterile cup at home  Requesting order be put in system and lexie will take urine specimen to lab  Linette Mcnair wanted another ua/cult repeated  Pt is better, but still having some urg/freq  I put order in system  Linette Matts could not remember what abx pt was tx with so I called [facility] and talked to kati  He said keflex 250 mg on 7/4

## 2018-07-18 LAB — BACTERIA UR CULT: ABNORMAL

## 2018-07-19 ENCOUNTER — TELEPHONE (OUTPATIENT)
Dept: FAMILY MEDICINE CLINIC | Facility: HOSPITAL | Age: 83
End: 2018-07-19

## 2018-07-19 NOTE — TELEPHONE ENCOUNTER
pts urine culture came back positive for E Coli  Pt was just started on Bactrim by er on We 7/18  This is susceptible to tx the uri  Mess to Consuela Fleischer     dk

## 2018-08-13 DIAGNOSIS — M47.812 CERVICAL ARTHRITIS: ICD-10-CM

## 2018-08-21 ENCOUNTER — APPOINTMENT (OUTPATIENT)
Dept: WOUND CARE | Facility: HOSPITAL | Age: 83
End: 2018-08-21
Attending: PODIATRIST
Payer: MEDICARE

## 2018-08-21 PROCEDURE — 99213 OFFICE O/P EST LOW 20 MIN: CPT | Performed by: PODIATRIST

## 2018-08-27 ENCOUNTER — APPOINTMENT (OUTPATIENT)
Dept: WOUND CARE | Facility: HOSPITAL | Age: 83
End: 2018-08-27
Payer: MEDICARE

## 2018-08-27 PROCEDURE — 11042 DBRDMT SUBQ TIS 1ST 20SQCM/<: CPT

## 2018-08-27 PROCEDURE — 11045 DBRDMT SUBQ TISS EACH ADDL: CPT

## 2018-09-05 ENCOUNTER — APPOINTMENT (OUTPATIENT)
Dept: WOUND CARE | Facility: HOSPITAL | Age: 83
End: 2018-09-05
Payer: MEDICARE

## 2018-09-05 PROCEDURE — 99212 OFFICE O/P EST SF 10 MIN: CPT

## 2018-09-12 ENCOUNTER — APPOINTMENT (OUTPATIENT)
Dept: WOUND CARE | Facility: HOSPITAL | Age: 83
End: 2018-09-12
Payer: MEDICARE

## 2018-09-12 PROCEDURE — 99212 OFFICE O/P EST SF 10 MIN: CPT

## 2018-09-15 ENCOUNTER — APPOINTMENT (EMERGENCY)
Dept: CT IMAGING | Facility: HOSPITAL | Age: 83
End: 2018-09-15
Payer: MEDICARE

## 2018-09-15 ENCOUNTER — HOSPITAL ENCOUNTER (EMERGENCY)
Facility: HOSPITAL | Age: 83
Discharge: HOME | End: 2018-09-15
Attending: EMERGENCY MEDICINE
Payer: MEDICARE

## 2018-09-15 ENCOUNTER — APPOINTMENT (EMERGENCY)
Dept: RADIOLOGY | Facility: HOSPITAL | Age: 83
End: 2018-09-15
Payer: MEDICARE

## 2018-09-15 VITALS
WEIGHT: 115.08 LBS | HEART RATE: 55 BPM | SYSTOLIC BLOOD PRESSURE: 167 MMHG | RESPIRATION RATE: 19 BRPM | DIASTOLIC BLOOD PRESSURE: 77 MMHG | TEMPERATURE: 98.1 F | OXYGEN SATURATION: 99 % | BODY MASS INDEX: 19.75 KG/M2

## 2018-09-15 DIAGNOSIS — S80.212A ABRASION OF LEFT KNEE, INITIAL ENCOUNTER: ICD-10-CM

## 2018-09-15 DIAGNOSIS — S80.211A ABRASION OF RIGHT KNEE, INITIAL ENCOUNTER: ICD-10-CM

## 2018-09-15 DIAGNOSIS — Z79.01 CHRONIC ANTICOAGULATION: ICD-10-CM

## 2018-09-15 DIAGNOSIS — S01.81XA FACIAL LACERATION, INITIAL ENCOUNTER: Primary | ICD-10-CM
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
## 2018-09-15 PROCEDURE — 99284 EMERGENCY DEPT VISIT MOD MDM: CPT

## 2018-09-15 PROCEDURE — 73560 X-RAY EXAM OF KNEE 1 OR 2: CPT

## 2018-09-15 PROCEDURE — 70486 CT MAXILLOFACIAL W/O DYE: CPT

## 2018-09-15 PROCEDURE — 70450 CT HEAD/BRAIN W/O DYE: CPT

## 2018-09-15 NOTE — ED PROVIDER NOTES
H&P Exam - Trauma   Tamie  80 y o  female MRN: [id number]  Unit/Bed#: ED 01/ED 01 Encounter: [id number]    Assessment/Plan   Trauma Alert: Trauma Acuity: Trauma Evaluation  Model of Arrival: Trauma Mode of Arrival: Other (Comment) (private vehicle ) via    Trauma Team: Current Providers  Attending Provider: Sally Lemus MD  Physician Assistant: Ciara Cabrales PA-C  Registered Nurse: Mirtha Khan RN  Consultants: None    Trauma Active Problems:   Head injury w/o LOC  L facial laceration  R knee contusion w/ abrasion  L knee contusion w/ abrasion  Fall from bed  On anticoagulants    Trauma Plan: CT head to r/o fx, ICH; CT facial bones to r/o fx; XR bilat knees  Pt declines C collar immobilization and CT C spine    Chief Complaint:   Chief Complaint   Patient presents with   [facility] Fall     To ED with family for evaluation after falling this morning at 0430  Pt states that her toe caught in the sheet and she fell OOB onto the floor  Denies any LOC, neck pain  History of Present Illness   HPI:  Tamie Fortune is a 80 y o  female who presents with multiple injuries sustained after a fall from her bed  She reports that she was attempting to get out of bed when her foot got caught on a sheet, causing her to fall approximately 3 feet, striking her head and knees on the ground  She takes Eliquis daily  Denies LOC, dizziness, h/a, vision changes, neck pain, CP, SOB, abd pain or extremity paresthesias  She was able to get up and self ambulate  She lives at home with her son  Mechanism:Details of Incident: Patient got up to go to bathroom and "toe got caught in my blankets " patient reports falling onto the carpeted ground  Injury Date: 09/15/18 Injury Time: 0400      HPI  Review of Systems   Constitutional: Negative for activity change, fatigue and fever  HENT: Negative for nosebleeds  Eyes: Negative for photophobia and visual disturbance  Respiratory: Negative for shortness of breath  Cardiovascular: Negative for chest pain  Gastrointestinal: Negative for abdominal pain, nausea and vomiting  Musculoskeletal: Negative for back pain, gait problem and neck pain  Skin: Positive for wound  Neurological: Negative for dizziness, weakness, light-headedness, numbness and headaches  Hematological: Bruises/bleeds easily (on eliquis)  Psychiatric/Behavioral: Negative for confusion         Historical Information     Immunizations:   Immunization History   Administered Date(s) Administered    Hep A, adult 01/30/1998, 10/01/1998    Influenza 10/06/2015, 11/14/2016, 11/02/2017    Influenza Quadrivalent, 6-35 Months IM 11/02/2017    Influenza Split High Dose Preservative Free IM 10/06/2015, 11/14/2016    Influenza TIV (IM) 08/27/2013    Pneumococcal Polysaccharide PPV23 08/15/2006    Td (adult), adsorbed 06/01/2009    Zoster 05/01/2013       Past Medical History:   Diagnosis Date    Acute myocardial infarction [facility])     Atrial fibrillation ([facility] [address] )     CAD (coronary artery disease)     Cellulitis      AND ABSCESS    Cholecystitis     Closed Colles' fracture     OF THE LEFT WRIST; LAST ASSESSED: 5/2/14    Closed fracture of radius     LAST ASSESSED: 2/19/14    Dyspnea     Hair loss disorder     Hematuria     History of being hospitalized     1/5/12-1/14/12 [address] PROCEDURES: 1) REDO STERNOTOMY AND AORTIC VALVE REPLACEMENT WITH A 19-MM REBA-PETERS BOVINIE PERICARDIAL MAGNA EASE VALVE 2) TRANSESOPHAGEAL ECHOCARDIOGRAM     Hypercholesterolemia     Hypertension     Incomplete bladder emptying     Leg wound, left     Malaise and fatigue     Sick sinus syndrome ([facility] [address] )        Family History   Problem Relation Age of Onset    Diabetes Mother         MELLITUS    Hypertension Mother     Heart disease Mother     Prostate cancer Father     Dementia Brother     Heart disease Maternal Grandmother     Hypertension Maternal Grandmother     Heart disease Maternal Grandfather     Hypertension Family     Osteoporosis Family     Substance Abuse Neg Hx     Mental illness Neg Hx      Past Surgical History:   Procedure Laterality Date    AORTIC VALVE REPLACEMENT  01/2012    HEART VALVE    CARDIAC SURGERY      CATARACT EXTRACTION W/  INTRAOCULAR LENS IMPLANT  08/26/2010    Shayne Laws MD; PHACOEMULISIFICATION; INSERTION INTRAOCULAR LENS OD    CORONARY ARTERY BYPASS GRAFT  01/2005       Social History     Social History    Marital status:      Spouse name: N/A    Number of children: N/A    Years of education: N/A     Occupational History    RETIRED      Social History Main Topics    Smoking status: Former Smoker    Smokeless tobacco: Never Used      Comment: QUIT 36 YEARS AGO ALSO NEVER A SMOKER AS PER [employer]    Alcohol use Yes      Comment: social    Drug use: No    Sexual activity: Not Asked     Other Topics Concern    None     Social History Narrative    Lives with son  Feels safe  Has living will  ACTIVE ADVANCE DIRECTIVE    CAFFEINE USE: 1 1/2 CUPS COFFEE QD    EXERCISE HABITS    GOOD DENTAL HYGIENE    LIVING SITUATION: SUPPORTIVE AND SAFE           Family History: non-contributory    Meds/Allergies   Prior to Admission Medications   Prescriptions Last Dose Informant Patient Reported? Taking?    Coenzyme Q10 (COQ10) 100 MG CAPS  Self Yes No   Sig: Take 1 capsule by mouth Daily   Multiple Vitamins-Minerals (OCUVITE EXTRA) TABS  Self Yes No   Sig: Take 1 tablet by mouth daily   Omega-3 1000 MG CAPS  Self Yes No   Sig: Take by mouth   amLODIPine (NORVASC) 5 mg tablet   No No   Sig: Take 1 tablet (5 mg total) by mouth daily   amiodarone 100 mg tablet  Self No No   Sig: Take 1 tablet daily   apixaban (ELIQUIS) 5 mg  Self No No   Sig: Take 1 tablet (5 mg total) by mouth 2 (two) times a day   atorvastatin (LIPITOR) 20 mg tablet   No No   Sig: Take 1 tablet (20 mg total) by mouth daily   atorvastatin (LIPITOR) 20 mg tablet   No No   Sig: TAKE ONE TABLET BY MOUTH EVERY DAY   clotrimazole-betamethasone (LOTRISONE) 1-0 05 % cream  Self No No   Sig: Apply topically 2 (two) times a day   conjugated estrogens (PREMARIN) vaginal cream  Self Yes No   Sig: Apply a pea size amount of the estrogen cream to the opening of the vagina three nights per week  potassium chloride (K-DUR,KLOR-CON) 10 mEq tablet   No No   Sig: Take 1 tablet (10 mEq total) by mouth daily   traMADol-acetaminophen (ULTRACET) 37 5-325 mg per tablet   No No   Sig: Take 1 tablet by mouth 4 (four) times a day as needed for moderate pain      Facility-Administered Medications: None       Allergies   Allergen Reactions    Heparin      Annotation - [id number]: LOW PLATELETS    Phenazopyridine        PHYSICAL EXAM    PE limited by: none    Objective   Vitals:   First set: Temperature: 98 1 °F (36 7 °C) (09/15/18 1125)  Pulse: 60 (09/15/18 1125)  Respirations: 20 (09/15/18 1125)  Blood Pressure: (!) 186/87 (09/15/18 1125)  SpO2: 99 % (09/15/18 1125)    Primary Survey:   (A) Airway: Intact, self maintained  (B) Breathing: Regular and unlabored  (C) Circulation: Pulses:   pedal  2/4 and radial  2/4  (D) Disabliity:  GCS Total:  15  (E) Expose:  Completed    Secondary Survey: (Click on Physical Exam tab above)  Physical Exam   Constitutional: She is oriented to person, place, and time  She appears well-developed and well-nourished  No distress  HENT:   Head: Normocephalic  Head is without raccoon's eyes and without Sánchez's sign  Minor L infraorbital ecchymosis, no crepitus   Eyes: EOM are normal  Pupils are equal, round, and reactive to light  Neck: Normal range of motion  Neck supple  No spinous process tenderness and no muscular tenderness present  Cardiovascular: Normal rate and regular rhythm  No murmur heard  Pulses:       Radial pulses are 2+ on the right side, and 2+ on the left side  Dorsalis pedis pulses are 2+ on the right side, and 2+ on the left side     Pulmonary/Chest: Breath sounds normal  No stridor  No respiratory distress  She has no rales  She exhibits no tenderness  Abdominal: Soft  Bowel sounds are normal  She exhibits no distension  There is no tenderness  Musculoskeletal: She exhibits no edema  Right knee: She exhibits ecchymosis and laceration (superficial skin tear)  She exhibits normal range of motion and no swelling  Left knee: She exhibits ecchymosis and laceration (superficial skin tear)  She exhibits normal range of motion and no swelling  Neurological: She is alert and oriented to person, place, and time  No cranial nerve deficit  Gait steady w/o deficit  Cerebellar normal, no DDK or dysmetria   Skin: Skin is warm and dry  She is not diaphoretic  No erythema  Psychiatric: She has a normal mood and affect  Her behavior is normal    Vitals reviewed  Invasive Devices          No matching active lines, drains, or airways          Lab Results:   Results Reviewed     None                 Imaging Studies:   XR knee 1 or 2 views RIGHT   ED Interpretation by Vickey Gonzalez PA-C (09/15 1206)   No acute osseus abnormality      CT head without contrast   Final Result by Melba Garay MD (09/15 1205)      No acute intracranial abnormality  Microangiopathic changes  Workstation performed: [id number]         XR knee 1 or 2 views LEFT   ED Interpretation by Vickey Gonzalez PA-C (09/15 1206)   No acute osseus abnormality      CT facial bones without contrast   Final Result by Melba Garay MD (09/15 1203)      No facial bone fracture                 Workstation performed: [id number]             Other Studies: none    Code Status: No Order  Advance Directive and Living Will:      Power of :    POLST:      Procedures  Procedures       Phone Contacts  ED Phone Contact     ED Course         MDM  Number of Diagnoses or Management Options  Abrasion of left knee, initial encounter: new and requires workup  Abrasion of right knee, initial encounter: new and requires workup  Accidental fall from bed, initial encounter: new and requires workup  Chronic anticoagulation:   Facial laceration, initial encounter: new and requires workup  Diagnosis management comments: 81 yo female presents after a ground level fall  She is on anticoagulants  CT of the head unremarkable for skull fx or ICH  Pt declined CT neck; I informed her that based on her LIGIA, as well as age, I cannot clinically r/o the presence of an acute cervical fx, however she again declines  She has multiple minor skin tears to the L temporal face and bilat knees, non of which require primary closure  Wounds cleansed and dressed w/ xeroform and gauze  Remained stable and complaint free throughout time in the ED  Amount and/or Complexity of Data Reviewed  Tests in the radiology section of CPT®: ordered and reviewed  Review and summarize past medical records: yes  Independent visualization of images, tracings, or specimens: yes      CritCare Time    Disposition  Final diagnoses:   Facial laceration, initial encounter   Abrasion of left knee, initial encounter   Abrasion of right knee, initial encounter   Accidental fall from bed, initial encounter   Chronic anticoagulation     Time reflects when diagnosis was documented in both MDM as applicable and the Disposition within this note     Time User Action Codes Description Comment    9/15/2018 12:08 PM Laura Guerrero Add [S01 81XA] Facial laceration, initial encounter     9/15/2018 12:08 PM Laura Guerrero Add [S80 212A] Abrasion of left knee, initial encounter     9/15/2018 12:08 PM Laura Guerrero Add [S80 211A] Abrasion of right knee, initial encounter     9/15/2018 12:08 PM Laura Guerrero Add [W06  XXXA] Accidental fall from bed, initial encounter     9/15/2018 12:08 PM Laura Guerrero Add [Z79 01] Chronic anticoagulation       ED Disposition     ED Disposition Condition Comment    Discharge  Ysabel Lover discharge to home/self care      Condition at discharge: Good        Follow-up Information     Follow up With Specialties Details Why [address], [facility]   [place]  [address] [address]  [phone]          Discharge Medication List as of 9/15/2018 12:09 PM      CONTINUE these medications which have NOT CHANGED    Details   amiodarone 100 mg tablet Take 1 tablet daily, Normal      amLODIPine (NORVASC) 5 mg tablet Take 1 tablet (5 mg total) by mouth daily, Starting Tue 7/10/2018, Normal      apixaban (ELIQUIS) 5 mg Take 1 tablet (5 mg total) by mouth 2 (two) times a day, Starting Tue 3/6/2018, Sample      !! atorvastatin (LIPITOR) 20 mg tablet Take 1 tablet (20 mg total) by mouth daily, Starting Tue 7/10/2018, Normal      !! atorvastatin (LIPITOR) 20 mg tablet TAKE ONE TABLET BY MOUTH EVERY DAY, Normal      clotrimazole-betamethasone (LOTRISONE) 1-0 05 % cream Apply topically 2 (two) times a day, Starting Wed 2/28/2018, Normal      Coenzyme Q10 (COQ10) 100 MG CAPS Take 1 capsule by mouth Daily, Historical Med      conjugated estrogens (PREMARIN) vaginal cream Apply a pea size amount of the estrogen cream to the opening of the vagina three nights per week , Historical Med      Multiple Vitamins-Minerals (OCUVITE EXTRA) TABS Take 1 tablet by mouth daily, Historical Med      Omega-3 1000 MG CAPS Take by mouth, Historical Med      potassium chloride (K-DUR,KLOR-CON) 10 mEq tablet Take 1 tablet (10 mEq total) by mouth daily, Starting Tue 6/26/2018, Print      traMADol-acetaminophen (ULTRACET) 37 5-325 mg per tablet Take 1 tablet by mouth 4 (four) times a day as needed for moderate pain, Starting Mon 8/13/2018, Normal       !! - Potential duplicate medications found  Please discuss with provider  No discharge procedures on file        ED Provider  Electronically Signed by         [place] PA-C  09/15/18 [id number]

## 2018-09-15 NOTE — DISCHARGE INSTRUCTIONS
Fall Prevention for Older Adults   WHAT YOU NEED TO KNOW:   As you age, your muscles weaken and your risk for falls increases  Your risk also increases if you take medicines that make you sleepy or dizzy  You may also be at risk if you have vision or joint problems, have low blood pressure, or are not active  DISCHARGE INSTRUCTIONS:   Call 911 or have someone else call if:   · You have fallen and are unconscious  · You have fallen and cannot move part of your body  Contact your healthcare provider if:   · You have fallen and have pain or a headache  · You have questions or concerns about your condition or care  Fall prevention tips:   · Stay active  Exercise can help strengthen your muscles and improve your balance  Your healthcare provider may recommend water aerobics, walking, or Mitch Chi  He may also recommend physical therapy to improve your coordination  Never start an exercise program without asking your healthcare provider first     · Wear shoes that fit well and have soles that   Wear shoes both inside and outside  Use slippers with good   Avoid shoes with high heels  · Use assistive devices as directed  Your healthcare provider may suggest that you use a cane or walker to help you keep your balance  You may need to have grab bars put in your bathroom near the toilet or in the shower  · Stand or sit up slowly  This may help you keep your balance and prevent falls  · Wear a personal alarm  This is a device that allows you to call 911 if you need help  Ask for more information on personal alarms  · Manage your medical conditions  Keep all appointments with your healthcare providers  Visit your eye doctor as directed  Home safety tips:   · Add items to prevent falls in the bathroom  Put nonslip strips on your bath or shower floor to prevent you from slipping  Use a bath mat if you do not have carpet in the bathroom   This will prevent you from falling when you step out of the bath or shower  Use a shower seat so you do not need to stand while you shower  Sit on the toilet or a chair in your bathroom to dry yourself and put on clothing  This will prevent you from losing your balance from drying or dressing yourself while you are standing  · Keep paths clear  Remove books, shoes, and other objects from walkways and stairs  Place cords for telephones and lamps out of the way so that you do not need to walk over them  Tape them down if you cannot move them  Remove small rugs  If you cannot remove a rug, secure it with double-sided tape  This will prevent you from tripping  · Install bright lights in your home  Use night lights to help light paths to the bathroom or kitchen  Always turn on the light before you start walking  · Keep items you use often on shelves within reach  Do not use a step stool to help you reach an item  · Paint or place reflective tape on the edges of your stairs  This will help you see the stairs better  Follow up with your healthcare provider as directed:  Write down your questions so you remember to ask them during your visits  © 2017 [address] Information is for End User's use only and may not be sold, redistributed or otherwise used for commercial purposes  All illustrations and images included in CareNotes® are the copyrighted property of A [employer] M [employer]  or Nilesh Loomis  The above information is an  only  It is not intended as medical advice for individual conditions or treatments  Talk to your doctor, nurse or pharmacist before following any medical regimen to see if it is safe and effective for you

## 2018-09-17 ENCOUNTER — APPOINTMENT (OUTPATIENT)
Dept: WOUND CARE | Facility: HOSPITAL | Age: 83
End: 2018-09-17
Payer: MEDICARE

## 2018-09-17 PROCEDURE — 99214 OFFICE O/P EST MOD 30 MIN: CPT

## 2018-09-18 DIAGNOSIS — I35.9 AORTIC VALVULAR DISEASE: Primary | ICD-10-CM

## 2018-09-18 RX ORDER — AMOXICILLIN 500 MG/1
TABLET, FILM COATED ORAL
Qty: 8 TABLET | Refills: 1
Start: 2018-09-18 | End: 2018-09-20

## 2018-09-20 ENCOUNTER — OFFICE VISIT (OUTPATIENT)
Dept: CARDIOLOGY CLINIC | Facility: CLINIC | Age: 83
End: 2018-09-20
Payer: MEDICARE

## 2018-09-20 VITALS
WEIGHT: 118 LBS | HEART RATE: 53 BPM | DIASTOLIC BLOOD PRESSURE: 62 MMHG | HEIGHT: 64 IN | BODY MASS INDEX: 20.14 KG/M2 | SYSTOLIC BLOOD PRESSURE: 142 MMHG

## 2018-09-20 DIAGNOSIS — Z95.1 HX OF CABG: ICD-10-CM

## 2018-09-20 DIAGNOSIS — Z95.2 HISTORY OF AORTIC VALVE REPLACEMENT: ICD-10-CM

## 2018-09-20 DIAGNOSIS — R00.1 BRADYCARDIA: ICD-10-CM

## 2018-09-20 DIAGNOSIS — I48.0 PAROXYSMAL ATRIAL FIBRILLATION (HCC): Primary | ICD-10-CM

## 2018-09-20 DIAGNOSIS — I10 ESSENTIAL HYPERTENSION: ICD-10-CM

## 2018-09-20 DIAGNOSIS — I25.10 CORONARY ARTERY DISEASE INVOLVING NATIVE CORONARY ARTERY OF NATIVE HEART WITHOUT ANGINA PECTORIS: ICD-10-CM

## 2018-09-20 PROCEDURE — 99214 OFFICE O/P EST MOD 30 MIN: CPT | Performed by: INTERNAL MEDICINE

## 2018-09-20 PROCEDURE — 93000 ELECTROCARDIOGRAM COMPLETE: CPT | Performed by: INTERNAL MEDICINE

## 2018-09-20 NOTE — PROGRESS NOTES
Cardiology Follow Up    Shai Ribeiro  7/30/1929  [id number]  HEART & VASCULAR  [facility]  [address]  [address] [address]    1  Paroxysmal atrial fibrillation (HCC)  POCT ECG   2  Coronary artery disease involving native coronary artery of native heart without angina pectoris     3  Hx of CABG     4  History of aortic valve replacement     5  Essential hypertension     6  Bradycardia         Interval History:     Mrs Denece Dakin comes in for follow-up given her history of paroxysmal and recurrent atrial fibrillation, along with her prior history of CAD and valvular heart disease  She is status post CABG in 2005 after being found to have multivessel disease, but then had progression of aortic valve disease and had a bioprosthetic aortic valve replaced in 2012  She had been on Multaq antiarrhythmic therapy for her AF, but seemed to be having breakthrough symptoms around our last appointment and was going to lose insurance coverage of this  She has had an intolerance to beta-blocker in the past likely due to bradycardia  Also, she was once on Coumadin but she tells me she requested coming off due to some side effects and had been on aspirin since  Her echocardiograms through the years of show normal LV systolic function and a normally functioning bioprosthetic aortic valve replacement  More recently we have been dealing with issues both recurrent atrial fibrillation bradycardia  A few visits ago Vaibhav Shaver had an acceleration of symptoms that led to an emergency room visit  She was in sinus rhythm but had frequent ectopy  In follow-up we changed her from Multaq to amiodarone  We also initiated Eliquis for stroke prevention  She came in for an ECG earlier this year in which she was in a junctional rhythm and therefore we backed off of her amiodarone to 100 mg daily    Over the last few visits she has been in sinus bradycardia  Ernei Zapata clinically feels about the same  She denies any lightheadedness  No near-syncope or syncope  She is still active and goes above and beyond her activities of daily living  She denies chest pain or shortness of breath  Other than some mild lower extremity edema, she denies any other signs or symptoms of CHF  She was recently evaluated in the ER for a fall, in which she sustained injury next to her left eye  Patient Active Problem List   Diagnosis    Hx of CABG    Aortic valve disorder    Coronary artery disease    Essential hypertension    Hyperlipidemia    Paroxysmal atrial fibrillation (HCC)    History of aortic valve replacement    Arteriosclerosis of carotid artery    Cervical spine arthritis ([facility] [address] [facility]    Cholelithiasis without cholecystitis    Chronic cervical radiculopathy    Dystrophy of vulva    Mixed stress and urge urinary incontinence    Neural foraminal stenosis of cervical spine    Occipital neuralgia of right side    Osteoarthritis    Peripheral neuropathy    Bradycardia    Gastroesophageal reflux disease with esophagitis     Past Medical History:   Diagnosis Date    Acute myocardial infarction ([facility] )     Atrial fibrillation ([facility] )     CAD (coronary artery disease)     Cellulitis      AND ABSCESS    Cholecystitis     Closed Colles' fracture     OF THE LEFT WRIST; LAST ASSESSED: 5/2/14    Closed fracture of radius     LAST ASSESSED: 2/19/14    Dyspnea     Hair loss disorder     Hematuria     History of being hospitalized     1/5/12-1/14/12 Juan Diego MACIEL PROCEDURES: 1) REDO STERNOTOMY AND AORTIC VALVE REPLACEMENT WITH A 19-MM REBA-PETERS BOVINIE PERICARDIAL MAGNA EASE VALVE 2) TRANSESOPHAGEAL ECHOCARDIOGRAM     Hypercholesterolemia     Hypertension     Incomplete bladder emptying     Leg wound, left     Malaise and fatigue     Sick sinus syndrome [facility])      Social History     Social History    Marital status:       Spouse name: N/A   Michelle Alaniz Number of children: N/A    Years of education: N/A     Occupational History    RETIRED      Social History Main Topics    Smoking status: Former Smoker    Smokeless tobacco: Never Used      Comment: QUIT 36 YEARS AGO ALSO NEVER A SMOKER AS PER [employer]    Alcohol use Yes      Comment: social    Drug use: No    Sexual activity: Not on file     Other Topics Concern    Not on file     Social History Narrative    Lives with son  Feels safe  Has living will      ACTIVE ADVANCE DIRECTIVE    CAFFEINE USE: 1 1/2 CUPS COFFEE QD    EXERCISE HABITS    GOOD DENTAL HYGIENE    LIVING SITUATION: SUPPORTIVE AND SAFE          Family History   Problem Relation Age of Onset    Diabetes Mother         MELLITUS    Hypertension Mother     Heart disease Mother     Prostate cancer Father     Dementia Brother     Heart disease Maternal Grandmother     Hypertension Maternal Grandmother     Heart disease Maternal Grandfather     Hypertension Family     Osteoporosis Family     Substance Abuse Neg Hx     Mental illness Neg Hx      Past Surgical History:   Procedure Laterality Date    AORTIC VALVE REPLACEMENT  01/2012    HEART VALVE    CARDIAC SURGERY      CATARACT EXTRACTION W/  INTRAOCULAR LENS IMPLANT  08/26/2010    Aminah Kate MD; PHACOEMULISIFICATION; INSERTION INTRAOCULAR LENS OD    CORONARY ARTERY BYPASS GRAFT  01/2005       Current Outpatient Prescriptions:     amiodarone 100 mg tablet, Take 1 tablet daily, Disp: 90 tablet, Rfl: 3    amLODIPine (NORVASC) 5 mg tablet, Take 1 tablet (5 mg total) by mouth daily, Disp: 30 tablet, Rfl: 3    amoxicillin (AMOXIL) 500 MG tablet, Take 4 tabs 1 hour prior to dental procedure, Disp: 8 tablet, Rfl: 1    apixaban (ELIQUIS) 5 mg, Take 1 tablet (5 mg total) by mouth 2 (two) times a day, Disp: 70 tablet, Rfl: 0    atorvastatin (LIPITOR) 20 mg tablet, Take 1 tablet (20 mg total) by mouth daily, Disp: 30 tablet, Rfl: 3    Coenzyme Q10 (COQ10) 100 MG CAPS, Take 1 capsule by mouth Daily, Disp: , Rfl:     Multiple Vitamins-Minerals (OCUVITE EXTRA) TABS, Take 1 tablet by mouth daily, Disp: , Rfl:     Omega-3 1000 MG CAPS, Take by mouth, Disp: , Rfl:     potassium chloride (K-DUR,KLOR-CON) 10 mEq tablet, Take 1 tablet (10 mEq total) by mouth daily, Disp: 30 tablet, Rfl: 5    traMADol-acetaminophen (ULTRACET) 37 5-325 mg per tablet, Take 1 tablet by mouth 4 (four) times a day as needed for moderate pain, Disp: 120 tablet, Rfl: 0    clotrimazole-betamethasone (LOTRISONE) 1-0 05 % cream, Apply topically 2 (two) times a day (Patient not taking: Reported on 9/20/2018 ), Disp: 30 g, Rfl: 2    conjugated estrogens (PREMARIN) vaginal cream, Apply a pea size amount of the estrogen cream to the opening of the vagina three nights per week , Disp: , Rfl:   Allergies   Allergen Reactions    Heparin      Annotation - [id number]: LOW PLATELETS    Phenazopyridine        Labs:  Lab Results   Component Value Date     05/18/2018     08/04/2015    K 3 4 (L) 05/18/2018    K 3 6 08/04/2015     05/18/2018     08/04/2015    CO2 32 05/18/2018    CO2 26 4 08/04/2015    BUN 20 05/18/2018    BUN 19 08/04/2015    CREATININE 0 96 05/18/2018    CREATININE 1 19 08/04/2015    GLUCOSE 92 08/04/2015    CALCIUM 9 1 05/18/2018    CALCIUM 8 6 08/04/2015     Imaging:   ECG demonstrates marked sinus bradycardia with nonspecific ST and T-wave changes      ECHO (12/17):  LEFT VENTRICLE:  Size was normal   Systolic function was normal  Ejection fraction was estimated to be 65 %    Wall thickness was at the upper limits of normal   Features were consistent with a pseudonormal left ventricular filling pattern, with concomitant abnormal relaxation and increased filling pressure (grade 2 diastolic dysfunction)      RIGHT VENTRICLE:  The size was normal   Systolic function was normal      LEFT ATRIUM:  The atrium was mildly dilated      RIGHT ATRIUM:  The atrium was mildly dilated      MITRAL VALVE:  There was mild regurgitation      AORTIC VALVE:  A bioprosthesis was present  There was mild stenosis  There was trace regurgitation      TRICUSPID VALVE:  There was mild regurgitation      COMPARISONS:  There has been no significant interval change  Comparison was made with the previous study of 11-Jan-2017  Review of Systems:  Review of Systems   Constitutional: Positive for fatigue  HENT: Negative  Eyes: Negative  Respiratory: Negative  Cardiovascular: Positive for leg swelling  Gastrointestinal: Negative  Musculoskeletal: Negative  Skin: Negative  Allergic/Immunologic: Negative  Neurological: Positive for weakness  Hematological: Negative  Psychiatric/Behavioral: Negative  All other systems reviewed and are negative  Physical Exam:  Physical Exam   Constitutional: She is oriented to person, place, and time  She appears well-developed and well-nourished  HENT:   Head: Normocephalic and atraumatic  Eyes: EOM are normal  Pupils are equal, round, and reactive to light  Right eye exhibits no discharge  Left eye exhibits no discharge  No scleral icterus  Neck: Normal range of motion  Neck supple  No JVD present  No thyromegaly present  Cardiovascular: Normal rate, S1 normal, S2 normal, intact distal pulses and normal pulses  An irregularly irregular rhythm present  PMI is not displaced  Exam reveals no gallop and no friction rub  Murmur heard  Crescendo decrescendo systolic murmur is present with a grade of 3/6   Pulmonary/Chest: Effort normal and breath sounds normal  No respiratory distress  She has no wheezes  She has no rales  Abdominal: Soft  Bowel sounds are normal  She exhibits no distension  There is no tenderness  Musculoskeletal: Normal range of motion  She exhibits edema  She exhibits no tenderness or deformity  Right lower leg: She exhibits edema  Left lower leg: She exhibits edema     Neurological: She is alert and oriented to person, place, and time  No cranial nerve deficit  Skin: Skin is warm and dry  No rash noted  Psychiatric: She has a normal mood and affect  Judgment and thought content normal    Nursing note and vitals reviewed  Discussion/Summary:    1  JESSICA Forde remains in a sinus bradycardia  She is asymptomatic  She will remain on low-dose amiodarone  I did explain to her and her daughter that he permanent pacemaker is in her future  I have asked him to call if any signs of lightheadedness or presyncope occur  She should continue to check her pulse and blood pressure regularly  She will remain on Eliquis for stroke prevention  2   RONDA - Gabriel Forde had prior CABG in 2005  She is without symptoms of angina  No other changes were made to her medical therapy  3   Bioprosthetic aortic valve replacement -  Functioning normal by echocardiogram in December  No changes made today  She knows to take antibiotic prophylaxis for any dental procedures  4  HTN - Her blood pressure is for the most part controlled  She should periodically check her blood pressure at home  We will see her back in follow-up in 6 months      Counseling / Coordination of Care  Total floor / unit time spent today 25 minutes  Greater than 50% of total time was spent with the patient and / or family counseling and / or coordination of care

## 2018-09-24 ENCOUNTER — APPOINTMENT (OUTPATIENT)
Dept: LAB | Facility: HOSPITAL | Age: 83
End: 2018-09-24
Attending: INTERNAL MEDICINE
Payer: MEDICARE

## 2018-09-24 DIAGNOSIS — E78.1 PURE HYPERGLYCERIDEMIA: ICD-10-CM

## 2018-09-24 DIAGNOSIS — I10 ESSENTIAL HYPERTENSION: ICD-10-CM

## 2018-09-24 LAB
ALBUMIN SERPL BCP-MCNC: 3.7 G/DL (ref 3.5–5)
ALP SERPL-CCNC: 93 U/L (ref 46–116)
ALT SERPL W P-5'-P-CCNC: 25 U/L (ref 12–78)
ANION GAP SERPL CALCULATED.3IONS-SCNC: 9 MMOL/L (ref 4–13)
AST SERPL W P-5'-P-CCNC: 23 U/L (ref 5–45)
BASOPHILS # BLD AUTO: 0.04 THOUSANDS/ΜL (ref 0–0.1)
BASOPHILS NFR BLD AUTO: 1 % (ref 0–1)
BILIRUB SERPL-MCNC: 0.6 MG/DL (ref 0.2–1)
BUN SERPL-MCNC: 13 MG/DL (ref 5–25)
CALCIUM SERPL-MCNC: 9.1 MG/DL (ref 8.3–10.1)
CHLORIDE SERPL-SCNC: 103 MMOL/L (ref 100–108)
CHOLEST SERPL-MCNC: 168 MG/DL (ref 50–200)
CO2 SERPL-SCNC: 30 MMOL/L (ref 21–32)
CREAT SERPL-MCNC: 1.08 MG/DL (ref 0.6–1.3)
EOSINOPHIL # BLD AUTO: 0.16 THOUSAND/ΜL (ref 0–0.61)
EOSINOPHIL NFR BLD AUTO: 2 % (ref 0–6)
ERYTHROCYTE [DISTWIDTH] IN BLOOD BY AUTOMATED COUNT: 13.2 % (ref 11.6–15.1)
GFR SERPL CREATININE-BSD FRML MDRD: 46 ML/MIN/1.73SQ M
GLUCOSE P FAST SERPL-MCNC: 93 MG/DL (ref 65–99)
HCT VFR BLD AUTO: 40.6 % (ref 34.8–46.1)
HDLC SERPL-MCNC: 81 MG/DL (ref 40–60)
HGB BLD-MCNC: 13 G/DL (ref 11.5–15.4)
IMM GRANULOCYTES # BLD AUTO: 0.04 THOUSAND/UL (ref 0–0.2)
IMM GRANULOCYTES NFR BLD AUTO: 1 % (ref 0–2)
LDLC SERPL CALC-MCNC: 76 MG/DL (ref 0–100)
LYMPHOCYTES # BLD AUTO: 2.33 THOUSANDS/ΜL (ref 0.6–4.47)
LYMPHOCYTES NFR BLD AUTO: 27 % (ref 14–44)
MCH RBC QN AUTO: 30.2 PG (ref 26.8–34.3)
MCHC RBC AUTO-ENTMCNC: 32 G/DL (ref 31.4–37.4)
MCV RBC AUTO: 94 FL (ref 82–98)
MONOCYTES # BLD AUTO: 0.79 THOUSAND/ΜL (ref 0.17–1.22)
MONOCYTES NFR BLD AUTO: 9 % (ref 4–12)
NEUTROPHILS # BLD AUTO: 5.17 THOUSANDS/ΜL (ref 1.85–7.62)
NEUTS SEG NFR BLD AUTO: 60 % (ref 43–75)
NRBC BLD AUTO-RTO: 0 /100 WBCS
PLATELET # BLD AUTO: 213 THOUSANDS/UL (ref 149–390)
PMV BLD AUTO: 10.6 FL (ref 8.9–12.7)
POTASSIUM SERPL-SCNC: 3.5 MMOL/L (ref 3.5–5.3)
PROT SERPL-MCNC: 7.7 G/DL (ref 6.4–8.2)
RBC # BLD AUTO: 4.3 MILLION/UL (ref 3.81–5.12)
SODIUM SERPL-SCNC: 142 MMOL/L (ref 136–145)
TRIGL SERPL-MCNC: 57 MG/DL
WBC # BLD AUTO: 8.53 THOUSAND/UL (ref 4.31–10.16)

## 2018-09-24 PROCEDURE — 36415 COLL VENOUS BLD VENIPUNCTURE: CPT

## 2018-09-24 PROCEDURE — 85025 COMPLETE CBC W/AUTO DIFF WBC: CPT

## 2018-09-24 PROCEDURE — 80053 COMPREHEN METABOLIC PANEL: CPT

## 2018-09-24 PROCEDURE — 80061 LIPID PANEL: CPT

## 2018-09-26 ENCOUNTER — APPOINTMENT (OUTPATIENT)
Dept: WOUND CARE | Facility: HOSPITAL | Age: 83
End: 2018-09-26
Payer: MEDICARE

## 2018-09-26 PROCEDURE — 11045 DBRDMT SUBQ TISS EACH ADDL: CPT

## 2018-09-26 PROCEDURE — 11042 DBRDMT SUBQ TIS 1ST 20SQCM/<: CPT

## 2018-10-02 ENCOUNTER — OFFICE VISIT (OUTPATIENT)
Dept: FAMILY MEDICINE CLINIC | Facility: HOSPITAL | Age: 83
End: 2018-10-02
Payer: MEDICARE

## 2018-10-02 VITALS
OXYGEN SATURATION: 95 % | BODY MASS INDEX: 19.97 KG/M2 | DIASTOLIC BLOOD PRESSURE: 64 MMHG | HEIGHT: 64 IN | HEART RATE: 51 BPM | WEIGHT: 117 LBS | SYSTOLIC BLOOD PRESSURE: 136 MMHG

## 2018-10-02 DIAGNOSIS — I48.0 PAROXYSMAL ATRIAL FIBRILLATION (HCC): ICD-10-CM

## 2018-10-02 DIAGNOSIS — I35.9 AORTIC VALVE DISORDER: ICD-10-CM

## 2018-10-02 DIAGNOSIS — Z23 NEED FOR INFLUENZA VACCINATION: ICD-10-CM

## 2018-10-02 DIAGNOSIS — R30.0 DYSURIA: ICD-10-CM

## 2018-10-02 DIAGNOSIS — I25.10 CORONARY ARTERY DISEASE INVOLVING NATIVE CORONARY ARTERY OF NATIVE HEART WITHOUT ANGINA PECTORIS: ICD-10-CM

## 2018-10-02 DIAGNOSIS — Z23 NEED FOR PNEUMOCOCCAL VACCINATION: Primary | ICD-10-CM

## 2018-10-02 DIAGNOSIS — I10 ESSENTIAL HYPERTENSION: ICD-10-CM

## 2018-10-02 PROCEDURE — 90670 PCV13 VACCINE IM: CPT

## 2018-10-02 PROCEDURE — 90662 IIV NO PRSV INCREASED AG IM: CPT

## 2018-10-02 PROCEDURE — G0009 ADMIN PNEUMOCOCCAL VACCINE: HCPCS

## 2018-10-02 PROCEDURE — G0008 ADMIN INFLUENZA VIRUS VAC: HCPCS

## 2018-10-02 PROCEDURE — 99213 OFFICE O/P EST LOW 20 MIN: CPT | Performed by: INTERNAL MEDICINE

## 2018-10-02 RX ORDER — AMIODARONE HYDROCHLORIDE 200 MG/1
100 TABLET ORAL
COMMUNITY
Start: 2018-08-24 | End: 2018-10-02

## 2018-10-02 NOTE — PATIENT INSTRUCTIONS
Call if  You need refills   call for appt with dr Gary Long if inguinal area with increasing weakness noted- possible early hernia

## 2018-10-02 NOTE — ASSESSMENT & PLAN NOTE
Seen by cardiology- veronica Regan recently- no new  Med changes- had been decreased on amiodarone to 100 mg daily- has had some bradycardia with beta blockers- had some ongoing bradyarrythmias- now at 55

## 2018-10-02 NOTE — PROGRESS NOTES
Assessment/Plan:             Problem List Items Addressed This Visit        Cardiovascular and Mediastinum    Aortic valve disorder    Coronary artery disease     No recent angina         Essential hypertension     bp is stable- checks if not feeling well         Paroxysmal atrial fibrillation ([address] )     Seen by cardiology- veronica Suarez recently- no new  Med changes- had been decreased on amiodarone to 100 mg daily- has had some bradycardia with beta blockers- had some ongoing bradyarrythmias- now at 54           Other Visit Diagnoses     Need for pneumococcal vaccination    -  Primary    Relevant Orders    PNEUMOCOCCAL CONJUGATE VACCINE 13-VALENT GREATER THAN 6 MONTHS (Completed)    Need for influenza vaccination        Relevant Orders    influenza vaccine, [id number], high-dose, PF 0 5 mL, for patients 65 yr+ (FLUZONE HIGH-DOSE) (Completed)            Subjective:      Patient ID: Justino Lewis is a 80 y o  female    1  Inguinal lumps- has noted for about 6 months- not noticiable when lyin g down- thought it was a fatty lump- no pain- feels squishy        The following portions of the patient's history were reviewed and updated as appropriate: allergies, current medications and problem list      Review of Systems   Constitutional: Negative for chills and fever  Cardiovascular: Negative for chest pain, palpitations and leg swelling  Genitourinary: Positive for dysuria  Negative for flank pain and urgency  Pressure last pm with voiding - none now   Neurological: Negative for dizziness, weakness and numbness  All other systems reviewed and are negative          Objective:      Current Outpatient Prescriptions:     amLODIPine (NORVASC) 5 mg tablet, Take 1 tablet (5 mg total) by mouth daily, Disp: 30 tablet, Rfl: 3    apixaban (ELIQUIS) 5 mg, Take 1 tablet (5 mg total) by mouth 2 (two) times a day, Disp: 70 tablet, Rfl: 0    atorvastatin (LIPITOR) 20 mg tablet, Take 1 tablet (20 mg total) by mouth daily, Disp: 30 tablet, Rfl: 3    clotrimazole-betamethasone (LOTRISONE) 1-0 05 % cream, Apply topically 2 (two) times a day, Disp: 30 g, Rfl: 2    Coenzyme Q10 (COQ10) 100 MG CAPS, Take 1 capsule by mouth Daily, Disp: , Rfl:     conjugated estrogens (PREMARIN) vaginal cream, Apply a pea size amount of the estrogen cream to the opening of the vagina three nights per week , Disp: , Rfl:     Multiple Vitamins-Minerals (OCUVITE EXTRA) TABS, Take 1 tablet by mouth daily, Disp: , Rfl:     Omega-3 1000 MG CAPS, Take by mouth, Disp: , Rfl:     potassium chloride (K-DUR,KLOR-CON) 10 mEq tablet, Take 1 tablet (10 mEq total) by mouth daily, Disp: 30 tablet, Rfl: 5    traMADol-acetaminophen (ULTRACET) 37 5-325 mg per tablet, Take 1 tablet by mouth 4 (four) times a day as needed for moderate pain, Disp: 120 tablet, Rfl: 0    amiodarone 100 mg tablet, Take 1 tablet daily (Patient not taking: Reported on 10/2/2018 ), Disp: 90 tablet, Rfl: 3    Blood pressure 136/64, pulse (!) 51, height 5' 4" (1 626 m), weight 53 1 kg (117 lb), SpO2 95 %, not currently breastfeeding  Physical Exam   Constitutional: She appears well-developed and well-nourished  No distress  HENT:   Head: Normocephalic  Right Ear: External ear normal    Left Ear: External ear normal    Mouth/Throat: Oropharynx is clear and moist    Healing laceration on left lateral temporal region- still some firmness   Eyes: Right eye exhibits no discharge  Left eye exhibits no discharge  Neck: No JVD present  No tracheal deviation present  No thyromegaly present  Cardiovascular: Normal rate and regular rhythm  Murmur heard  Grade 2 6 systolic murmur apex   Pulmonary/Chest: Effort normal and breath sounds normal  No respiratory distress  She has no wheezes  She has no rales  Abdominal: Soft  Bowel sounds are normal  She exhibits no distension  Weakness in right inguinal region of musclulature- no firm masses noted- no loops of bowel in inguinal ring-    Left mild anterior abdominal wall weakness   Nursing note and vitals reviewed

## 2018-10-04 ENCOUNTER — APPOINTMENT (OUTPATIENT)
Dept: LAB | Facility: HOSPITAL | Age: 83
End: 2018-10-04
Attending: INTERNAL MEDICINE
Payer: MEDICARE

## 2018-10-04 ENCOUNTER — TRANSCRIBE ORDERS (OUTPATIENT)
Dept: ADMINISTRATIVE | Facility: HOSPITAL | Age: 83
End: 2018-10-04

## 2018-10-04 DIAGNOSIS — N39.0 URINARY TRACT INFECTION WITHOUT HEMATURIA, SITE UNSPECIFIED: Primary | ICD-10-CM

## 2018-10-04 LAB
BACTERIA UR QL AUTO: ABNORMAL /HPF
BILIRUB UR QL STRIP: NEGATIVE
CLARITY UR: CLEAR
COLOR UR: YELLOW
GLUCOSE UR STRIP-MCNC: NEGATIVE MG/DL
HGB UR QL STRIP.AUTO: ABNORMAL
KETONES UR STRIP-MCNC: NEGATIVE MG/DL
LEUKOCYTE ESTERASE UR QL STRIP: ABNORMAL
NITRITE UR QL STRIP: POSITIVE
NON-SQ EPI CELLS URNS QL MICRO: ABNORMAL /HPF
PH UR STRIP.AUTO: 6.5 [PH] (ref 4.5–8)
PROT UR STRIP-MCNC: NEGATIVE MG/DL
RBC #/AREA URNS AUTO: ABNORMAL /HPF
SP GR UR STRIP.AUTO: 1.01 (ref 1–1.03)
UROBILINOGEN UR QL STRIP.AUTO: 0.2 E.U./DL
WBC #/AREA URNS AUTO: ABNORMAL /HPF

## 2018-10-04 PROCEDURE — 81001 URINALYSIS AUTO W/SCOPE: CPT | Performed by: INTERNAL MEDICINE

## 2018-10-05 ENCOUNTER — APPOINTMENT (OUTPATIENT)
Dept: WOUND CARE | Facility: HOSPITAL | Age: 83
End: 2018-10-05
Payer: MEDICARE

## 2018-10-05 PROCEDURE — 97597 DBRDMT OPN WND 1ST 20 CM/<: CPT

## 2018-10-09 ENCOUNTER — TELEPHONE (OUTPATIENT)
Dept: FAMILY MEDICINE CLINIC | Facility: HOSPITAL | Age: 83
End: 2018-10-09

## 2018-10-09 NOTE — TELEPHONE ENCOUNTER
No culture was done as limited wbc noted on  microscopic- if still having symptoms will have pt see by urology- r/o chronic cystitis

## 2018-10-10 NOTE — TELEPHONE ENCOUNTER
dtr aware  She will inform pt and f/u w/uro that she has seen in the past  Dtr unsure of name but wcb if needed   CR

## 2018-10-19 ENCOUNTER — APPOINTMENT (OUTPATIENT)
Dept: WOUND CARE | Facility: HOSPITAL | Age: 83
End: 2018-10-19
Payer: MEDICARE

## 2018-10-19 PROCEDURE — 99212 OFFICE O/P EST SF 10 MIN: CPT

## 2018-11-13 ENCOUNTER — OFFICE VISIT (OUTPATIENT)
Dept: FAMILY MEDICINE CLINIC | Facility: HOSPITAL | Age: 83
End: 2018-11-13
Payer: MEDICARE

## 2018-11-13 VITALS
WEIGHT: 117 LBS | SYSTOLIC BLOOD PRESSURE: 142 MMHG | BODY MASS INDEX: 19.97 KG/M2 | TEMPERATURE: 97.6 F | HEIGHT: 64 IN | RESPIRATION RATE: 16 BRPM | DIASTOLIC BLOOD PRESSURE: 68 MMHG | HEART RATE: 82 BPM

## 2018-11-13 DIAGNOSIS — B02.9 HERPES ZOSTER WITHOUT COMPLICATION: Primary | ICD-10-CM

## 2018-11-13 PROCEDURE — 99213 OFFICE O/P EST LOW 20 MIN: CPT | Performed by: INTERNAL MEDICINE

## 2018-11-13 RX ORDER — HYDROCODONE BITARTRATE AND ACETAMINOPHEN 5; 325 MG/1; MG/1
1 TABLET ORAL EVERY 6 HOURS PRN
Qty: 20 TABLET | Refills: 0 | Status: SHIPPED | OUTPATIENT
Start: 2018-11-13 | End: 2018-11-26 | Stop reason: SDUPTHER

## 2018-11-13 RX ORDER — VALACYCLOVIR HYDROCHLORIDE 500 MG/1
500 TABLET, FILM COATED ORAL 3 TIMES DAILY
Qty: 21 TABLET | Refills: 0 | Status: SHIPPED | OUTPATIENT
Start: 2018-11-13 | End: 2019-02-19 | Stop reason: ALTCHOICE

## 2018-11-13 NOTE — PROGRESS NOTES
Assessment/Plan:             Problem List Items Addressed This Visit     None      Visit Diagnoses     Herpes zoster without complication    -  Primary    Relevant Medications    valACYclovir (VALTREX) 500 mg tablet    HYDROcodone-acetaminophen (NORCO) 5-325 mg per tablet            Subjective:      Patient ID: Raquel Chowdhury is a 80 y o  female    1  Began 2 days ago with a rash on her right inner thigh now with some blistering areas and pain in itching  Also has some pain in the lower lumbar area on the right side  No fever or chills but feels somewhat fatigued        The following portions of the patient's history were reviewed and updated as appropriate: allergies, current medications and problem list      Review of Systems   All other systems reviewed and are negative          Objective:      Current Outpatient Prescriptions:     amiodarone 100 mg tablet, Take 1 tablet daily, Disp: 90 tablet, Rfl: 3    amLODIPine (NORVASC) 5 mg tablet, Take 1 tablet (5 mg total) by mouth daily, Disp: 30 tablet, Rfl: 3    apixaban (ELIQUIS) 5 mg, Take 1 tablet (5 mg total) by mouth 2 (two) times a day, Disp: 70 tablet, Rfl: 0    atorvastatin (LIPITOR) 20 mg tablet, Take 1 tablet (20 mg total) by mouth daily, Disp: 30 tablet, Rfl: 3    clotrimazole-betamethasone (LOTRISONE) 1-0 05 % cream, Apply topically 2 (two) times a day, Disp: 30 g, Rfl: 2    Coenzyme Q10 (COQ10) 100 MG CAPS, Take 1 capsule by mouth Daily, Disp: , Rfl:     conjugated estrogens (PREMARIN) vaginal cream, Apply a pea size amount of the estrogen cream to the opening of the vagina three nights per week , Disp: , Rfl:     Multiple Vitamins-Minerals (OCUVITE EXTRA) TABS, Take 1 tablet by mouth daily, Disp: , Rfl:     Omega-3 1000 MG CAPS, Take by mouth, Disp: , Rfl:     potassium chloride (K-DUR,KLOR-CON) 10 mEq tablet, Take 1 tablet (10 mEq total) by mouth daily, Disp: 30 tablet, Rfl: 5    traMADol-acetaminophen (ULTRACET) 37 5-325 mg per tablet, Take 1 tablet by mouth 4 (four) times a day as needed for moderate pain, Disp: 120 tablet, Rfl: 0    HYDROcodone-acetaminophen (NORCO) 5-325 mg per tablet, Take 1 tablet by mouth every 6 (six) hours as needed for pain Max Daily Amount: 4 tablets, Disp: 20 tablet, Rfl: 0    valACYclovir (VALTREX) 500 mg tablet, Take 1 tablet (500 mg total) by mouth 3 (three) times a day for 7 days, Disp: 21 tablet, Rfl: 0    Blood pressure 142/68, pulse 82, temperature 97 6 °F (36 4 °C), temperature source Tympanic, resp  rate 16, height 5' 4" (1 626 m), weight 53 1 kg (117 lb), not currently breastfeeding  Physical Exam   Constitutional: She is oriented to person, place, and time  She appears well-developed and well-nourished  No distress  Eyes: Conjunctivae are normal  Right eye exhibits no discharge  Left eye exhibits no discharge  No scleral icterus  Neck: No JVD present  Cardiovascular: Normal rate  Murmur heard  Irregular regular at 80 beats per minute  Crisp valve closure sound   Pulmonary/Chest: Effort normal and breath sounds normal  No respiratory distress  She has no wheezes  She has no rales  Abdominal: Soft  Bowel sounds are normal  She exhibits no distension  There is no tenderness  Musculoskeletal: She exhibits no edema or deformity  Mild tenderness in lower lumbar area on the right no blistering noted that region   Neurological: She is alert and oriented to person, place, and time  No cranial nerve deficit  Coordination normal    Skin: Skin is warm  She is not diaphoretic  Small clusters of blisters on the inner aspect of the right thigh in a dermatome distribution extending towards the hip  No erythema or purulent drainage   Psychiatric: She has a normal mood and affect  Her behavior is normal  Judgment and thought content normal    Nursing note and vitals reviewed

## 2018-11-15 ENCOUNTER — TELEPHONE (OUTPATIENT)
Dept: FAMILY MEDICINE CLINIC | Facility: HOSPITAL | Age: 83
End: 2018-11-15

## 2018-11-15 DIAGNOSIS — B02.9 HERPES ZOSTER WITHOUT COMPLICATION: ICD-10-CM

## 2018-11-15 RX ORDER — VALACYCLOVIR HYDROCHLORIDE 1 G/1
TABLET, FILM COATED ORAL
Qty: 8 TABLET | Refills: 1
Start: 2018-11-15

## 2018-11-19 ENCOUNTER — TELEPHONE (OUTPATIENT)
Dept: FAMILY MEDICINE CLINIC | Facility: HOSPITAL | Age: 83
End: 2018-11-19

## 2018-11-19 DIAGNOSIS — R52 PAIN: Primary | ICD-10-CM

## 2018-11-19 NOTE — TELEPHONE ENCOUNTER
Pt's daughter called  Pt is not feeling any better  Sill in pain  Needs refill on pain meds however they really have not helped  She was wondering about trying gabapentin  Also today is the last day for Valtrex  Is she to continue with it? If so will need Rx sent to [facility]

## 2018-11-20 DIAGNOSIS — G62.9 NEUROPATHY: Primary | ICD-10-CM

## 2018-11-20 RX ORDER — PREDNISOLONE ACETATE 10 MG/ML
SUSPENSION/ DROPS OPHTHALMIC
COMMUNITY
Start: 2018-11-06 | End: 2019-04-02 | Stop reason: ALTCHOICE

## 2018-11-20 RX ORDER — GABAPENTIN 100 MG/1
100 CAPSULE ORAL 3 TIMES DAILY
COMMUNITY
Start: 2018-11-19 | End: 2018-11-20 | Stop reason: SDUPTHER

## 2018-11-20 RX ORDER — GABAPENTIN 100 MG/1
100 CAPSULE ORAL 3 TIMES DAILY
Qty: 90 CAPSULE | Refills: 3 | Status: SHIPPED | OUTPATIENT
Start: 2018-11-20 | End: 2018-11-26 | Stop reason: SDUPTHER

## 2018-11-20 RX ORDER — VALACYCLOVIR HYDROCHLORIDE 1 G/1
TABLET, FILM COATED ORAL
COMMUNITY
Start: 2018-11-15 | End: 2019-02-19 | Stop reason: ALTCHOICE

## 2018-11-23 RX ORDER — GABAPENTIN 100 MG/1
100 CAPSULE ORAL 3 TIMES DAILY
Qty: 90 CAPSULE | Refills: 3 | Status: SHIPPED | OUTPATIENT
Start: 2018-11-23 | End: 2018-12-27 | Stop reason: ALTCHOICE

## 2018-11-26 ENCOUNTER — TELEPHONE (OUTPATIENT)
Dept: FAMILY MEDICINE CLINIC | Facility: HOSPITAL | Age: 83
End: 2018-11-26

## 2018-11-26 DIAGNOSIS — M47.812 CERVICAL ARTHRITIS: ICD-10-CM

## 2018-11-26 DIAGNOSIS — B02.9 HERPES ZOSTER WITHOUT COMPLICATION: ICD-10-CM

## 2018-11-26 RX ORDER — HYDROCODONE BITARTRATE AND ACETAMINOPHEN 5; 325 MG/1; MG/1
1 TABLET ORAL
Qty: 20 TABLET | Refills: 0 | Status: SHIPPED | OUTPATIENT
Start: 2018-11-26 | End: 2019-02-19 | Stop reason: ALTCHOICE

## 2018-11-26 NOTE — TELEPHONE ENCOUNTER
Spoke to daughter Kevin Torres still having difficulty sleeping in marked pain due to her shingles-  She did do a week of the Valtrex and is using her chronic tramadol/acetaminophen twice a day-will increase that up to 3 times a day and continue on the gabapentin  I will refill hydrocodone a past to be used 1 at nighttime     This should notify us if not improved

## 2018-11-30 ENCOUNTER — TELEPHONE (OUTPATIENT)
Dept: CARDIOLOGY CLINIC | Facility: CLINIC | Age: 83
End: 2018-11-30

## 2018-11-30 NOTE — TELEPHONE ENCOUNTER
Received call from pt's daughter - pt was prescribed Medrol dose pack for shingles pain by pain management  Pharmacist told her she should ask pt's cardiologist first before taking  Paged to Dr Floyd Gray - Per MD, OK to take Medrol dose pack with regard to PAF, but pt's daughter should be mindful of pt's weight etc in regard to hx of CHF  Message relayed as given, pt's daughter verbalized understanding  No further questions or concerns at this time

## 2018-12-05 DIAGNOSIS — I48.0 PAF (PAROXYSMAL ATRIAL FIBRILLATION) (HCC): Primary | ICD-10-CM

## 2018-12-05 RX ORDER — AMIODARONE HYDROCHLORIDE 200 MG/1
TABLET ORAL
Qty: 60 TABLET | Refills: 6 | Status: SHIPPED | OUTPATIENT
Start: 2018-12-05 | End: 2019-02-19

## 2018-12-07 ENCOUNTER — TELEPHONE (OUTPATIENT)
Dept: CARDIOLOGY CLINIC | Facility: CLINIC | Age: 83
End: 2018-12-07

## 2018-12-07 NOTE — TELEPHONE ENCOUNTER
Received call from pt's daughter  Pt will be having epidural injection with her pain management doctor on Monday  Per daughter, provider wants them to hold Eliquis a few days before the procedure and then resume immediately afterward  Per daughter this is mostly FYI as she is already holding the Eliquis  She wanted to inform you in case you had any other instruction otherwise

## 2018-12-20 ENCOUNTER — TELEPHONE (OUTPATIENT)
Dept: PAIN MEDICINE | Facility: MEDICAL CENTER | Age: 83
End: 2018-12-20

## 2018-12-20 DIAGNOSIS — I65.29 OCCLUSION AND STENOSIS OF UNSPECIFIED CAROTID ARTERY: Primary | ICD-10-CM

## 2018-12-20 NOTE — TELEPHONE ENCOUNTER
Dr Christin Byrne called asking to schedule an appt for this patient  Pt is being referred for post herpetic neuralgia of her leg  Pt is established with [facility] - last visit was with [facility] in 2016  This is for a new problem that patient was not previously seen for  First available appt on Dr Kamara Odor schedule was not until 1/11  Dr Christin Byrne requesting that we contact patient's daughter, Gino Duran, if you are able to get her in any sooner  Gino Duran can be reached at [phone]

## 2018-12-27 ENCOUNTER — TELEPHONE (OUTPATIENT)
Dept: FAMILY MEDICINE CLINIC | Facility: HOSPITAL | Age: 83
End: 2018-12-27

## 2018-12-27 DIAGNOSIS — I10 ESSENTIAL HYPERTENSION: Primary | ICD-10-CM

## 2018-12-27 DIAGNOSIS — B02.29 POST HERPETIC NEURALGIA: ICD-10-CM

## 2018-12-27 DIAGNOSIS — B02.29 POST HERPETIC NEURALGIA: Primary | ICD-10-CM

## 2018-12-27 DIAGNOSIS — I10 ESSENTIAL HYPERTENSION: ICD-10-CM

## 2018-12-27 RX ORDER — PREGABALIN 50 MG/1
CAPSULE ORAL
Qty: 150 CAPSULE | Refills: 0 | Status: SHIPPED | OUTPATIENT
Start: 2018-12-27 | End: 2019-03-25

## 2018-12-27 RX ORDER — AMLODIPINE BESYLATE 5 MG/1
10 TABLET ORAL DAILY
Qty: 30 TABLET | Refills: 5 | Status: SHIPPED | OUTPATIENT
Start: 2018-12-27 | End: 2019-02-19

## 2018-12-27 RX ORDER — FUROSEMIDE 20 MG/1
20 TABLET ORAL 2 TIMES DAILY
Qty: 30 TABLET | Refills: 0 | Status: SHIPPED | OUTPATIENT
Start: 2018-12-27 | End: 2019-02-19

## 2018-12-27 RX ORDER — DULOXETIN HYDROCHLORIDE 20 MG/1
20 CAPSULE, DELAYED RELEASE ORAL DAILY
Refills: 0
Start: 2018-12-27 | End: 2019-02-10 | Stop reason: SDUPTHER

## 2018-12-27 NOTE — TELEPHONE ENCOUNTER
Have patient increase the amlodipine to 10 mg daily and call blood pressure readings on Monday I have send in Rx for the increased dose but she may use 2 of the 5 mg tablets until then  Also we had started her on to duloxitene for her chronic pain issues-check with daughter if this has improved her post herpetic neuralgia pain at all

## 2018-12-27 NOTE — TELEPHONE ENCOUNTER
Spoke with daughter Ree Esposito she is getting better sleep on the Lyrica increased dose at 100 mg at HS will try 5 per day or 2 in the a m  1 midday and 2 at HS if it causes increased sedation  Will back down on the dose  I have sent in for for O some eye 20 mg daily take 1 today 1 tomorrow morning and weigh herself daily if her weight is not back down by 2 lb they should continue it through the weekend    They are to call if any worsening symptoms and daughter will watch her blood pressure daily

## 2018-12-27 NOTE — TELEPHONE ENCOUNTER
Pt daughter is aware she states that the duloxetine has not improved her pain at all  Also pt is retaining water   DD

## 2018-12-28 ENCOUNTER — TELEPHONE (OUTPATIENT)
Dept: FAMILY MEDICINE CLINIC | Facility: HOSPITAL | Age: 83
End: 2018-12-28

## 2019-01-16 ENCOUNTER — TELEPHONE (OUTPATIENT)
Dept: ADMINISTRATIVE | Facility: HOSPITAL | Age: 84
End: 2019-01-16

## 2019-01-16 DIAGNOSIS — I48.0 PAF (PAROXYSMAL ATRIAL FIBRILLATION) (HCC): ICD-10-CM

## 2019-01-16 DIAGNOSIS — I65.23 CAROTID STENOSIS, BILATERAL: Primary | ICD-10-CM

## 2019-01-16 NOTE — TELEPHONE ENCOUNTER
Pt's Daughter Milena Bhatti called requesting samples of Eliquis  Pt's daughter was informed of [facility]'s samples policy and she was informed that she was able to get a one time curtesy of one month supply but, after that we would not be able to supply samples  Milena Bhatti stated she would like to save that one time curtesy to the end of the year in case her mother falls in the [facility] hole   Therefore, a prescription will be sent to [facility] pharmacy for Eliquis 5 mg bid

## 2019-02-10 DIAGNOSIS — B02.29 POST HERPETIC NEURALGIA: ICD-10-CM

## 2019-02-11 RX ORDER — DULOXETIN HYDROCHLORIDE 20 MG/1
CAPSULE, DELAYED RELEASE ORAL
Qty: 30 CAPSULE | Refills: 1 | Status: SHIPPED | OUTPATIENT
Start: 2019-02-11 | End: 2019-02-19 | Stop reason: ALTCHOICE

## 2019-02-19 ENCOUNTER — OFFICE VISIT (OUTPATIENT)
Dept: FAMILY MEDICINE CLINIC | Facility: HOSPITAL | Age: 84
End: 2019-02-19
Payer: MEDICARE

## 2019-02-19 VITALS
SYSTOLIC BLOOD PRESSURE: 140 MMHG | BODY MASS INDEX: 20.23 KG/M2 | HEART RATE: 57 BPM | WEIGHT: 118.5 LBS | TEMPERATURE: 96.8 F | HEIGHT: 64 IN | DIASTOLIC BLOOD PRESSURE: 60 MMHG | OXYGEN SATURATION: 98 %

## 2019-02-19 DIAGNOSIS — E78.5 HYPERLIPIDEMIA, UNSPECIFIED HYPERLIPIDEMIA TYPE: ICD-10-CM

## 2019-02-19 DIAGNOSIS — I10 ESSENTIAL HYPERTENSION: ICD-10-CM

## 2019-02-19 DIAGNOSIS — B02.29 POST HERPETIC NEURALGIA: ICD-10-CM

## 2019-02-19 DIAGNOSIS — R00.1 BRADYCARDIA: Primary | ICD-10-CM

## 2019-02-19 DIAGNOSIS — M46.92 INFLAMMATORY SPONDYLOPATHY OF CERVICAL REGION (HCC): ICD-10-CM

## 2019-02-19 DIAGNOSIS — I48.0 PAF (PAROXYSMAL ATRIAL FIBRILLATION) (HCC): ICD-10-CM

## 2019-02-19 PROCEDURE — 99214 OFFICE O/P EST MOD 30 MIN: CPT | Performed by: INTERNAL MEDICINE

## 2019-02-19 RX ORDER — AMIODARONE HYDROCHLORIDE 200 MG/1
100 TABLET ORAL DAILY
Qty: 45 TABLET | Refills: 3 | Status: SHIPPED | OUTPATIENT
Start: 2019-02-19 | End: 2020-01-13

## 2019-02-19 RX ORDER — ATORVASTATIN CALCIUM 20 MG/1
20 TABLET, FILM COATED ORAL DAILY
Qty: 90 TABLET | Refills: 3 | Status: SHIPPED | OUTPATIENT
Start: 2019-02-19 | End: 2019-04-04 | Stop reason: SDUPTHER

## 2019-02-19 RX ORDER — FUROSEMIDE 20 MG/1
20 TABLET ORAL DAILY PRN
Qty: 30 TABLET | Refills: 5 | Status: SHIPPED | OUTPATIENT
Start: 2019-02-19 | End: 2019-04-02

## 2019-02-19 RX ORDER — AMLODIPINE BESYLATE 5 MG/1
5 TABLET ORAL DAILY
Qty: 90 TABLET | Refills: 3 | Status: SHIPPED | OUTPATIENT
Start: 2019-02-19 | End: 2019-03-25

## 2019-02-19 NOTE — ASSESSMENT & PLAN NOTE
Dr Lizandro Resendiz did injection 2 weeks ago with improving pain in right leg- has weaned down on the lyrica to  50 am 75 pm- off cymbalta and trmadol

## 2019-02-19 NOTE — PROGRESS NOTES
Assessment/Plan:             Problem List Items Addressed This Visit        Cardiovascular and Mediastinum    Essential hypertension    Relevant Medications    amLODIPine (NORVASC) 5 mg tablet    furosemide (LASIX) 20 mg tablet       Nervous and Auditory    Post herpetic neuralgia     Dr Collette Bryant did injection 2 weeks ago with improving pain in right leg- has weaned down on the lyrica to  50 am 75 pm- off cymbalta and trmadol         Relevant Medications    Zoster Vac Recomb Adjuvanted ([facility]) 50 MCG/0 5ML SUSR       Musculoskeletal and Integument    Inflammatory spondylopathy of cervical region [facility])     Neck pain is improved-0 seeing dr Collette Bryant            Other    Hyperlipidemia    Relevant Medications    atorvastatin (LIPITOR) 20 mg tablet    Bradycardia - Primary      Other Visit Diagnoses     PAF (paroxysmal atrial fibrillation) (HCC)        Relevant Medications    amiodarone 200 mg tablet    amLODIPine (NORVASC) 5 mg tablet    apixaban (ELIQUIS) 5 mg            Subjective:      Patient ID: Raquel Chowdhury is a 80 y o  female    1  phn- seeing dr Collette Bryant- will check on amitriptyline dose  2  avr- on eliquis- afib- following      The following portions of the patient's history were reviewed and updated as appropriate: allergies, current medications and problem list      Review of Systems   All other systems reviewed and are negative          Objective:      Current Outpatient Medications:     amiodarone 200 mg tablet, Take 0 5 tablets (100 mg total) by mouth daily, Disp: 45 tablet, Rfl: 3    amLODIPine (NORVASC) 5 mg tablet, Take 1 tablet (5 mg total) by mouth daily, Disp: 90 tablet, Rfl: 3    apixaban (ELIQUIS) 5 mg, Take 1 tablet (5 mg total) by mouth 2 (two) times a day, Disp: 180 tablet, Rfl: 1    atorvastatin (LIPITOR) 20 mg tablet, Take 1 tablet (20 mg total) by mouth daily, Disp: 90 tablet, Rfl: 3    Coenzyme Q10 (COQ10) 100 MG CAPS, Take 1 capsule by mouth Daily, Disp: , Rfl:     conjugated estrogens (PREMARIN) vaginal cream, Apply a pea size amount of the estrogen cream to the opening of the vagina three nights per week , Disp: , Rfl:     Multiple Vitamins-Minerals (OCUVITE EXTRA) TABS, Take 1 tablet by mouth daily, Disp: , Rfl:     Omega-3 1000 MG CAPS, Take by mouth, Disp: , Rfl:     potassium chloride (K-DUR,KLOR-CON) 10 mEq tablet, Take 1 tablet (10 mEq total) by mouth daily, Disp: 30 tablet, Rfl: 5    prednisoLONE acetate (PRED FORTE) 1 % ophthalmic suspension, , Disp: , Rfl:     pregabalin (LYRICA) 50 mg capsule, Take 2 am 1 at midday and 2 at hs- increased 5 per day 12/27/18, Disp: 150 capsule, Rfl: 0    furosemide (LASIX) 20 mg tablet, Take 1 tablet (20 mg total) by mouth daily as needed (edema) for up to 30 days, Disp: 30 tablet, Rfl: 5    Zoster Vac Recomb Adjuvanted (SHINGRIX) 50 MCG/0 5ML SUSR, Inject 2 Doses into a muscle see administration instructions 2 doses 2- 6 months apart, Disp: 2 each, Rfl: 0    Blood pressure 140/60, pulse 57, temperature (!) 96 8 °F (36 °C), height 5' 4" (1 626 m), weight 53 8 kg (118 lb 8 oz), SpO2 98 %, not currently breastfeeding  Physical Exam   Constitutional: She appears well-developed and well-nourished  HENT:   Head: Normocephalic and atraumatic  Excess cerumen in left ear   Eyes: Pupils are equal, round, and reactive to light  EOM are normal  Right eye exhibits no discharge  Left eye exhibits no discharge  Neck: No tracheal deviation present  No thyromegaly present  Cardiovascular: Normal rate and regular rhythm  Exam reveals no friction rub  No murmur heard  Pulmonary/Chest: Effort normal and breath sounds normal  No stridor  No respiratory distress  Abdominal: Soft  Bowel sounds are normal  She exhibits no distension  There is no tenderness  Musculoskeletal: Normal range of motion  She exhibits edema  She exhibits no deformity  Traced bilateral edema   Nursing note and vitals reviewed

## 2019-02-19 NOTE — PATIENT INSTRUCTIONS
1  Continue on current meds for pain   2  Restart on lasix 20 mg twice weekly and take potassium on those days   3  See in 6 weeks for amv  4   Use debrox weekly for both ears

## 2019-03-25 ENCOUNTER — OFFICE VISIT (OUTPATIENT)
Dept: FAMILY MEDICINE CLINIC | Facility: HOSPITAL | Age: 84
End: 2019-03-25
Payer: MEDICARE

## 2019-03-25 VITALS
DIASTOLIC BLOOD PRESSURE: 70 MMHG | HEIGHT: 64 IN | HEART RATE: 52 BPM | WEIGHT: 122 LBS | BODY MASS INDEX: 20.83 KG/M2 | SYSTOLIC BLOOD PRESSURE: 128 MMHG

## 2019-03-25 DIAGNOSIS — B02.29 POST HERPETIC NEURALGIA: ICD-10-CM

## 2019-03-25 DIAGNOSIS — R35.0 URINARY FREQUENCY: ICD-10-CM

## 2019-03-25 DIAGNOSIS — M46.92 INFLAMMATORY SPONDYLOPATHY OF CERVICAL REGION (HCC): ICD-10-CM

## 2019-03-25 DIAGNOSIS — G62.9 PERIPHERAL NEUROPATHY: Primary | ICD-10-CM

## 2019-03-25 DIAGNOSIS — I10 ESSENTIAL HYPERTENSION: ICD-10-CM

## 2019-03-25 DIAGNOSIS — R60.0 LOCALIZED EDEMA: ICD-10-CM

## 2019-03-25 DIAGNOSIS — I48.0 PAROXYSMAL ATRIAL FIBRILLATION (HCC): ICD-10-CM

## 2019-03-25 LAB
BACTERIA UR QL AUTO: ABNORMAL /HPF
BILIRUB UR QL STRIP: NEGATIVE
CLARITY UR: ABNORMAL
COLOR UR: YELLOW
GLUCOSE UR STRIP-MCNC: NEGATIVE MG/DL
HGB UR QL STRIP.AUTO: NEGATIVE
HYALINE CASTS #/AREA URNS LPF: ABNORMAL /LPF
KETONES UR STRIP-MCNC: NEGATIVE MG/DL
LEUKOCYTE ESTERASE UR QL STRIP: ABNORMAL
NITRITE UR QL STRIP: NEGATIVE
NON-SQ EPI CELLS URNS QL MICRO: ABNORMAL /HPF
PH UR STRIP.AUTO: 6 [PH]
PROT UR STRIP-MCNC: NEGATIVE MG/DL
RBC #/AREA URNS AUTO: ABNORMAL /HPF
SP GR UR STRIP.AUTO: 1.01 (ref 1–1.03)
UROBILINOGEN UR QL STRIP.AUTO: 0.2 E.U./DL
WBC #/AREA URNS AUTO: ABNORMAL /HPF

## 2019-03-25 PROCEDURE — 87086 URINE CULTURE/COLONY COUNT: CPT | Performed by: INTERNAL MEDICINE

## 2019-03-25 PROCEDURE — 99214 OFFICE O/P EST MOD 30 MIN: CPT | Performed by: INTERNAL MEDICINE

## 2019-03-25 PROCEDURE — 81001 URINALYSIS AUTO W/SCOPE: CPT | Performed by: INTERNAL MEDICINE

## 2019-03-25 PROCEDURE — 87077 CULTURE AEROBIC IDENTIFY: CPT | Performed by: INTERNAL MEDICINE

## 2019-03-25 PROCEDURE — 87186 SC STD MICRODIL/AGAR DIL: CPT | Performed by: INTERNAL MEDICINE

## 2019-03-25 RX ORDER — AMLODIPINE BESYLATE 2.5 MG/1
2.5 TABLET ORAL DAILY
Qty: 30 TABLET | Refills: 5 | Status: SHIPPED | OUTPATIENT
Start: 2019-03-25 | End: 2019-04-02

## 2019-03-25 RX ORDER — PREGABALIN 50 MG/1
CAPSULE ORAL
Qty: 60 CAPSULE | Refills: 0
Start: 2019-03-25 | End: 2019-04-04 | Stop reason: SDUPTHER

## 2019-03-25 NOTE — ASSESSMENT & PLAN NOTE
Had injections with Dr Candice Cooper- now  Having some recurrent symptoms - now 3 weeks after last injection- still has right upper thigh pain and aching- constant severe pain has improved   Is walking 3 miles regularly

## 2019-03-25 NOTE — PROGRESS NOTES
Assessment/Plan:             Problem List Items Addressed This Visit        Cardiovascular and Mediastinum    Essential hypertension    Relevant Medications    amLODIPine (NORVASC) 2 5 mg tablet    Paroxysmal atrial fibrillation (HCC)    Relevant Medications    amLODIPine (NORVASC) 2 5 mg tablet       Nervous and Auditory    Peripheral neuropathy - Primary    Post herpetic neuralgia     Had injections with Dr Malik Pedersen- now  Having some recurrent symptoms - now 3 weeks after last injection- still has right upper thigh pain and aching- constant severe pain has improved  Is walking 3 miles regularly         Relevant Medications    pregabalin (LYRICA) 50 mg capsule       Musculoskeletal and Integument    Inflammatory spondylopathy of cervical region [facility])      Other Visit Diagnoses     Localized edema        Relevant Orders    CBC and differential    Comprehensive metabolic panel    Lipid Panel with Direct LDL reflex    Urinary frequency        Relevant Orders    UA w Reflex to Microscopic w Reflex to Culture            Subjective:      Patient ID: Rachel Iglesias is a 80 y o  female    1  Leg edema- some increased burning sensation in right lower leg last night leg and felt warmth and then more brusiing- she feels that a small varicose vein may have popped- no trauma or active bleeding but increased  Discoloration of right lower leg this am  No increased warmth in region  No fever or chills  2  Strong smelling urine- feels that she is going more frequently at night , not during day as much  No flank pain      The following portions of the patient's history were reviewed and updated as appropriate: allergies, current medications and problem list      Review of Systems   Genitourinary: Positive for dysuria  Negative for pelvic pain and urgency  All other systems reviewed and are negative          Objective:      Current Outpatient Medications:     amiodarone 200 mg tablet, Take 0 5 tablets (100 mg total) by mouth daily, Disp: 45 tablet, Rfl: 3    amLODIPine (NORVASC) 2 5 mg tablet, Take 1 tablet (2 5 mg total) by mouth daily, Disp: 30 tablet, Rfl: 5    apixaban (ELIQUIS) 5 mg, Take 1 tablet (5 mg total) by mouth 2 (two) times a day, Disp: 180 tablet, Rfl: 1    atorvastatin (LIPITOR) 20 mg tablet, Take 1 tablet (20 mg total) by mouth daily, Disp: 90 tablet, Rfl: 3    Coenzyme Q10 (COQ10) 100 MG CAPS, Take 1 capsule by mouth Daily, Disp: , Rfl:     conjugated estrogens (PREMARIN) vaginal cream, Apply a pea size amount of the estrogen cream to the opening of the vagina three nights per week , Disp: , Rfl:     furosemide (LASIX) 20 mg tablet, Take 1 tablet (20 mg total) by mouth daily as needed (edema) for up to 30 days, Disp: 30 tablet, Rfl: 5    Multiple Vitamins-Minerals (OCUVITE EXTRA) TABS, Take 1 tablet by mouth daily, Disp: , Rfl:     Omega-3 1000 MG CAPS, Take by mouth, Disp: , Rfl:     prednisoLONE acetate (PRED FORTE) 1 % ophthalmic suspension, , Disp: , Rfl:     pregabalin (LYRICA) 50 mg capsule, 1 in am and 1 in pm as of 3/25/19, Disp: 60 capsule, Rfl: 0    potassium chloride (K-DUR,KLOR-CON) 10 mEq tablet, Take 1 tablet (10 mEq total) by mouth daily (Patient not taking: Reported on 3/25/2019), Disp: 30 tablet, Rfl: 5    Blood pressure 128/70, pulse (!) 52, height 5' 4" (1 626 m), weight 55 3 kg (122 lb), not currently breastfeeding  Physical Exam   Constitutional: She appears well-developed and well-nourished  HENT:   Head: Normocephalic and atraumatic  Excess cerumen in left ear   Eyes: Pupils are equal, round, and reactive to light  EOM are normal  Right eye exhibits no discharge  Left eye exhibits no discharge  Neck: No tracheal deviation present  No thyromegaly present  Cardiovascular: Normal rate and regular rhythm  Exam reveals no friction rub  No murmur heard  Pulmonary/Chest: Effort normal and breath sounds normal  No stridor  No respiratory distress  Abdominal: Soft   Bowel sounds are normal  She exhibits no distension  There is no tenderness  No flank tenderness   Musculoskeletal: Normal range of motion  She exhibits edema  She exhibits no deformity  Trace left  Lower leg edema- chronic discoloration of both lower legs with mild right  Leg ecchymosis- no firm  Venous changes  Plus one edema on right   Nursing note and vitals reviewed

## 2019-03-25 NOTE — PATIENT INSTRUCTIONS
1  Compression stockings or ace wraps  2  Decrease amlodipine to 2 5 mg daily - see if this helps decrease the leg edema  3   Take lasix 3x a week- do labs in 2 weeks

## 2019-03-26 ENCOUNTER — TELEPHONE (OUTPATIENT)
Dept: OTHER | Facility: OTHER | Age: 84
End: 2019-03-26

## 2019-03-26 DIAGNOSIS — N30.00 ACUTE CYSTITIS WITHOUT HEMATURIA: Primary | ICD-10-CM

## 2019-03-26 RX ORDER — CEPHALEXIN 500 MG/1
500 CAPSULE ORAL EVERY 8 HOURS SCHEDULED
Qty: 21 CAPSULE | Refills: 0 | Status: SHIPPED | OUTPATIENT
Start: 2019-03-26 | End: 2019-04-02

## 2019-03-26 NOTE — TELEPHONE ENCOUNTER
PT Didn't get her prescription to the Pharmacy  PT stated she will call back tomorrow to the office

## 2019-03-27 LAB — BACTERIA UR CULT: ABNORMAL

## 2019-04-02 ENCOUNTER — OFFICE VISIT (OUTPATIENT)
Dept: FAMILY MEDICINE CLINIC | Facility: HOSPITAL | Age: 84
End: 2019-04-02
Payer: MEDICARE

## 2019-04-02 ENCOUNTER — OFFICE VISIT (OUTPATIENT)
Dept: CARDIOLOGY CLINIC | Facility: CLINIC | Age: 84
End: 2019-04-02
Payer: MEDICARE

## 2019-04-02 VITALS
HEIGHT: 62 IN | DIASTOLIC BLOOD PRESSURE: 68 MMHG | SYSTOLIC BLOOD PRESSURE: 112 MMHG | BODY MASS INDEX: 22.45 KG/M2 | HEART RATE: 55 BPM | WEIGHT: 122 LBS

## 2019-04-02 VITALS
DIASTOLIC BLOOD PRESSURE: 68 MMHG | WEIGHT: 122 LBS | HEART RATE: 52 BPM | BODY MASS INDEX: 22.45 KG/M2 | OXYGEN SATURATION: 98 % | HEIGHT: 62 IN | SYSTOLIC BLOOD PRESSURE: 142 MMHG

## 2019-04-02 DIAGNOSIS — Z00.00 MEDICARE ANNUAL WELLNESS VISIT, SUBSEQUENT: Primary | ICD-10-CM

## 2019-04-02 DIAGNOSIS — Z95.2 HISTORY OF AORTIC VALVE REPLACEMENT: ICD-10-CM

## 2019-04-02 DIAGNOSIS — N30.00 ACUTE CYSTITIS WITHOUT HEMATURIA: ICD-10-CM

## 2019-04-02 DIAGNOSIS — N39.46 MIXED STRESS AND URGE URINARY INCONTINENCE: ICD-10-CM

## 2019-04-02 DIAGNOSIS — I48.0 PAROXYSMAL ATRIAL FIBRILLATION (HCC): ICD-10-CM

## 2019-04-02 DIAGNOSIS — R00.1 BRADYCARDIA: ICD-10-CM

## 2019-04-02 DIAGNOSIS — N90.4 DYSTROPHY OF VULVA: ICD-10-CM

## 2019-04-02 DIAGNOSIS — I10 ESSENTIAL HYPERTENSION: ICD-10-CM

## 2019-04-02 DIAGNOSIS — Z95.1 HX OF CABG: ICD-10-CM

## 2019-04-02 DIAGNOSIS — I25.10 CORONARY ARTERY DISEASE INVOLVING NATIVE CORONARY ARTERY OF NATIVE HEART WITHOUT ANGINA PECTORIS: ICD-10-CM

## 2019-04-02 DIAGNOSIS — I48.0 PAROXYSMAL ATRIAL FIBRILLATION (HCC): Primary | ICD-10-CM

## 2019-04-02 DIAGNOSIS — R60.0 LOWER EXTREMITY EDEMA: ICD-10-CM

## 2019-04-02 PROBLEM — M46.92 INFLAMMATORY SPONDYLOPATHY OF CERVICAL REGION (HCC): Status: RESOLVED | Noted: 2019-02-19 | Resolved: 2019-04-02

## 2019-04-02 PROCEDURE — 99214 OFFICE O/P EST MOD 30 MIN: CPT | Performed by: INTERNAL MEDICINE

## 2019-04-02 PROCEDURE — G0439 PPPS, SUBSEQ VISIT: HCPCS | Performed by: INTERNAL MEDICINE

## 2019-04-02 PROCEDURE — 93000 ELECTROCARDIOGRAM COMPLETE: CPT | Performed by: INTERNAL MEDICINE

## 2019-04-02 RX ORDER — TORSEMIDE 20 MG/1
20 TABLET ORAL DAILY
Qty: 30 TABLET | Refills: 6 | Status: SHIPPED | OUTPATIENT
Start: 2019-04-02 | End: 2020-04-29

## 2019-04-04 ENCOUNTER — HOSPITAL ENCOUNTER (OUTPATIENT)
Dept: NON INVASIVE DIAGNOSTICS | Facility: CLINIC | Age: 84
Discharge: HOME | End: 2019-04-04
Payer: MEDICARE

## 2019-04-04 DIAGNOSIS — I65.23 CAROTID STENOSIS, BILATERAL: ICD-10-CM

## 2019-04-04 DIAGNOSIS — B02.29 POST HERPETIC NEURALGIA: ICD-10-CM

## 2019-04-04 DIAGNOSIS — E78.5 HYPERLIPIDEMIA, UNSPECIFIED HYPERLIPIDEMIA TYPE: ICD-10-CM

## 2019-04-04 PROCEDURE — 93880 EXTRACRANIAL BILAT STUDY: CPT

## 2019-04-04 PROCEDURE — 93880 EXTRACRANIAL BILAT STUDY: CPT | Performed by: INTERNAL MEDICINE

## 2019-04-04 RX ORDER — ATORVASTATIN CALCIUM 20 MG/1
20 TABLET, FILM COATED ORAL DAILY
Qty: 90 TABLET | Refills: 3 | Status: SHIPPED | OUTPATIENT
Start: 2019-04-04 | End: 2020-03-30 | Stop reason: SDUPTHER

## 2019-04-04 RX ORDER — PREGABALIN 50 MG/1
CAPSULE ORAL
Qty: 180 CAPSULE | Refills: 0 | Status: SHIPPED | OUTPATIENT
Start: 2019-04-04 | End: 2019-09-19 | Stop reason: ALTCHOICE

## 2019-04-08 ENCOUNTER — APPOINTMENT (OUTPATIENT)
Dept: LAB | Facility: HOSPITAL | Age: 84
End: 2019-04-08
Attending: INTERNAL MEDICINE
Payer: MEDICARE

## 2019-04-08 DIAGNOSIS — R60.0 LOCALIZED EDEMA: ICD-10-CM

## 2019-04-08 DIAGNOSIS — E87.6 HYPOKALEMIA: Primary | ICD-10-CM

## 2019-04-08 DIAGNOSIS — N39.46 MIXED STRESS AND URGE URINARY INCONTINENCE: Primary | ICD-10-CM

## 2019-04-08 LAB
ALBUMIN SERPL BCP-MCNC: 3.4 G/DL (ref 3.5–5)
ALP SERPL-CCNC: 78 U/L (ref 46–116)
ALT SERPL W P-5'-P-CCNC: 30 U/L (ref 12–78)
ANION GAP SERPL CALCULATED.3IONS-SCNC: 7 MMOL/L (ref 4–13)
AST SERPL W P-5'-P-CCNC: 21 U/L (ref 5–45)
BASOPHILS # BLD AUTO: 0.02 THOUSANDS/ΜL (ref 0–0.1)
BASOPHILS NFR BLD AUTO: 0 % (ref 0–1)
BILIRUB SERPL-MCNC: 0.6 MG/DL (ref 0.2–1)
BILIRUB UR QL STRIP: NEGATIVE
BUN SERPL-MCNC: 18 MG/DL (ref 5–25)
CALCIUM SERPL-MCNC: 8.6 MG/DL (ref 8.3–10.1)
CHLORIDE SERPL-SCNC: 105 MMOL/L (ref 100–108)
CHOLEST SERPL-MCNC: 157 MG/DL (ref 50–200)
CLARITY UR: CLEAR
CO2 SERPL-SCNC: 31 MMOL/L (ref 21–32)
COLOR UR: NORMAL
CREAT SERPL-MCNC: 0.97 MG/DL (ref 0.6–1.3)
EOSINOPHIL # BLD AUTO: 0.07 THOUSAND/ΜL (ref 0–0.61)
EOSINOPHIL NFR BLD AUTO: 1 % (ref 0–6)
ERYTHROCYTE [DISTWIDTH] IN BLOOD BY AUTOMATED COUNT: 13.5 % (ref 11.6–15.1)
GFR SERPL CREATININE-BSD FRML MDRD: 52 ML/MIN/1.73SQ M
GLUCOSE P FAST SERPL-MCNC: 92 MG/DL (ref 65–99)
GLUCOSE UR STRIP-MCNC: NEGATIVE MG/DL
HCT VFR BLD AUTO: 35.5 % (ref 34.8–46.1)
HDLC SERPL-MCNC: 66 MG/DL (ref 40–60)
HGB BLD-MCNC: 11.1 G/DL (ref 11.5–15.4)
HGB UR QL STRIP.AUTO: NEGATIVE
IMM GRANULOCYTES # BLD AUTO: 0.02 THOUSAND/UL (ref 0–0.2)
IMM GRANULOCYTES NFR BLD AUTO: 0 % (ref 0–2)
KETONES UR STRIP-MCNC: NEGATIVE MG/DL
LDLC SERPL CALC-MCNC: 78 MG/DL (ref 0–100)
LEUKOCYTE ESTERASE UR QL STRIP: NEGATIVE
LYMPHOCYTES # BLD AUTO: 2.27 THOUSANDS/ΜL (ref 0.6–4.47)
LYMPHOCYTES NFR BLD AUTO: 33 % (ref 14–44)
MCH RBC QN AUTO: 29.1 PG (ref 26.8–34.3)
MCHC RBC AUTO-ENTMCNC: 31.3 G/DL (ref 31.4–37.4)
MCV RBC AUTO: 93 FL (ref 82–98)
MONOCYTES # BLD AUTO: 0.87 THOUSAND/ΜL (ref 0.17–1.22)
MONOCYTES NFR BLD AUTO: 13 % (ref 4–12)
NEUTROPHILS # BLD AUTO: 3.55 THOUSANDS/ΜL (ref 1.85–7.62)
NEUTS SEG NFR BLD AUTO: 53 % (ref 43–75)
NITRITE UR QL STRIP: NEGATIVE
NRBC BLD AUTO-RTO: 0 /100 WBCS
PH UR STRIP.AUTO: 7 [PH]
PLATELET # BLD AUTO: 157 THOUSANDS/UL (ref 149–390)
PMV BLD AUTO: 11.2 FL (ref 8.9–12.7)
POTASSIUM SERPL-SCNC: 3.2 MMOL/L (ref 3.5–5.3)
PROT SERPL-MCNC: 6.8 G/DL (ref 6.4–8.2)
PROT UR STRIP-MCNC: NEGATIVE MG/DL
RBC # BLD AUTO: 3.82 MILLION/UL (ref 3.81–5.12)
SODIUM SERPL-SCNC: 143 MMOL/L (ref 136–145)
SP GR UR STRIP.AUTO: 1.01 (ref 1–1.03)
TRIGL SERPL-MCNC: 66 MG/DL
UROBILINOGEN UR QL STRIP.AUTO: 0.2 E.U./DL
WBC # BLD AUTO: 6.8 THOUSAND/UL (ref 4.31–10.16)

## 2019-04-08 PROCEDURE — 85025 COMPLETE CBC W/AUTO DIFF WBC: CPT

## 2019-04-08 PROCEDURE — 80061 LIPID PANEL: CPT

## 2019-04-08 PROCEDURE — 80053 COMPREHEN METABOLIC PANEL: CPT

## 2019-04-08 PROCEDURE — 36415 COLL VENOUS BLD VENIPUNCTURE: CPT

## 2019-04-08 PROCEDURE — 81003 URINALYSIS AUTO W/O SCOPE: CPT

## 2019-04-08 RX ORDER — POTASSIUM CHLORIDE 750 MG/1
10 TABLET, EXTENDED RELEASE ORAL 2 TIMES DAILY
Qty: 60 TABLET | Refills: 2 | Status: SHIPPED | OUTPATIENT
Start: 2019-04-08 | End: 2020-03-30 | Stop reason: SDUPTHER

## 2019-04-09 DIAGNOSIS — E87.6 HYPOKALEMIA: Primary | ICD-10-CM

## 2019-04-17 ENCOUNTER — TELEPHONE (OUTPATIENT)
Dept: CARDIOLOGY CLINIC | Facility: CLINIC | Age: 84
End: 2019-04-17

## 2019-04-24 ENCOUNTER — TELEPHONE (OUTPATIENT)
Dept: FAMILY MEDICINE CLINIC | Facility: HOSPITAL | Age: 84
End: 2019-04-24

## 2019-04-24 DIAGNOSIS — M54.12 CHRONIC CERVICAL RADICULOPATHY: Primary | ICD-10-CM

## 2019-04-24 RX ORDER — GABAPENTIN 100 MG/1
100 CAPSULE ORAL 3 TIMES DAILY
Qty: 90 CAPSULE | Refills: 3 | Status: CANCELLED | OUTPATIENT
Start: 2019-04-24

## 2019-06-11 ENCOUNTER — OFFICE VISIT (OUTPATIENT)
Dept: CARDIOLOGY CLINIC | Facility: CLINIC | Age: 84
End: 2019-06-11
Payer: MEDICARE

## 2019-06-11 ENCOUNTER — TELEPHONE (OUTPATIENT)
Dept: CARDIOLOGY CLINIC | Facility: CLINIC | Age: 84
End: 2019-06-11

## 2019-06-11 VITALS
WEIGHT: 122 LBS | HEART RATE: 53 BPM | DIASTOLIC BLOOD PRESSURE: 70 MMHG | BODY MASS INDEX: 22.45 KG/M2 | SYSTOLIC BLOOD PRESSURE: 140 MMHG | HEIGHT: 62 IN

## 2019-06-11 DIAGNOSIS — Z95.2 HISTORY OF AORTIC VALVE REPLACEMENT: ICD-10-CM

## 2019-06-11 DIAGNOSIS — I48.0 PAROXYSMAL ATRIAL FIBRILLATION (HCC): Primary | ICD-10-CM

## 2019-06-11 DIAGNOSIS — I10 ESSENTIAL HYPERTENSION: ICD-10-CM

## 2019-06-11 DIAGNOSIS — I25.10 CORONARY ARTERY DISEASE INVOLVING NATIVE CORONARY ARTERY OF NATIVE HEART WITHOUT ANGINA PECTORIS: ICD-10-CM

## 2019-06-11 DIAGNOSIS — Z95.1 HX OF CABG: ICD-10-CM

## 2019-06-11 DIAGNOSIS — R00.1 BRADYCARDIA: ICD-10-CM

## 2019-06-11 DIAGNOSIS — E78.01 FAMILIAL HYPERCHOLESTEROLEMIA: ICD-10-CM

## 2019-06-11 PROCEDURE — 99214 OFFICE O/P EST MOD 30 MIN: CPT | Performed by: INTERNAL MEDICINE

## 2019-06-11 PROCEDURE — 93000 ELECTROCARDIOGRAM COMPLETE: CPT | Performed by: INTERNAL MEDICINE

## 2019-06-13 ENCOUNTER — APPOINTMENT (OUTPATIENT)
Dept: LAB | Facility: HOSPITAL | Age: 84
End: 2019-06-13
Attending: INTERNAL MEDICINE
Payer: MEDICARE

## 2019-06-13 DIAGNOSIS — I25.10 CORONARY ARTERY DISEASE INVOLVING NATIVE CORONARY ARTERY OF NATIVE HEART WITHOUT ANGINA PECTORIS: ICD-10-CM

## 2019-06-13 LAB
ALBUMIN SERPL BCP-MCNC: 3.6 G/DL (ref 3.5–5)
ALP SERPL-CCNC: 79 U/L (ref 46–116)
ALT SERPL W P-5'-P-CCNC: 26 U/L (ref 12–78)
ANION GAP SERPL CALCULATED.3IONS-SCNC: 7 MMOL/L (ref 4–13)
AST SERPL W P-5'-P-CCNC: 22 U/L (ref 5–45)
BASOPHILS # BLD AUTO: 0.02 THOUSANDS/ΜL (ref 0–0.1)
BASOPHILS NFR BLD AUTO: 0 % (ref 0–1)
BILIRUB SERPL-MCNC: 0.6 MG/DL (ref 0.2–1)
BUN SERPL-MCNC: 21 MG/DL (ref 5–25)
CALCIUM SERPL-MCNC: 9.3 MG/DL (ref 8.3–10.1)
CHLORIDE SERPL-SCNC: 106 MMOL/L (ref 100–108)
CHOLEST SERPL-MCNC: 169 MG/DL (ref 50–200)
CO2 SERPL-SCNC: 31 MMOL/L (ref 21–32)
CREAT SERPL-MCNC: 1.14 MG/DL (ref 0.6–1.3)
EOSINOPHIL # BLD AUTO: 0.15 THOUSAND/ΜL (ref 0–0.61)
EOSINOPHIL NFR BLD AUTO: 3 % (ref 0–6)
ERYTHROCYTE [DISTWIDTH] IN BLOOD BY AUTOMATED COUNT: 14.6 % (ref 11.6–15.1)
GFR SERPL CREATININE-BSD FRML MDRD: 43 ML/MIN/1.73SQ M
GLUCOSE P FAST SERPL-MCNC: 85 MG/DL (ref 65–99)
HCT VFR BLD AUTO: 35.3 % (ref 34.8–46.1)
HDLC SERPL-MCNC: 72 MG/DL (ref 40–60)
HGB BLD-MCNC: 11.1 G/DL (ref 11.5–15.4)
IMM GRANULOCYTES # BLD AUTO: 0.01 THOUSAND/UL (ref 0–0.2)
IMM GRANULOCYTES NFR BLD AUTO: 0 % (ref 0–2)
LDLC SERPL CALC-MCNC: 88 MG/DL (ref 0–100)
LYMPHOCYTES # BLD AUTO: 1.56 THOUSANDS/ΜL (ref 0.6–4.47)
LYMPHOCYTES NFR BLD AUTO: 28 % (ref 14–44)
MCH RBC QN AUTO: 27.8 PG (ref 26.8–34.3)
MCHC RBC AUTO-ENTMCNC: 31.4 G/DL (ref 31.4–37.4)
MCV RBC AUTO: 88 FL (ref 82–98)
MONOCYTES # BLD AUTO: 0.78 THOUSAND/ΜL (ref 0.17–1.22)
MONOCYTES NFR BLD AUTO: 14 % (ref 4–12)
NEUTROPHILS # BLD AUTO: 3.03 THOUSANDS/ΜL (ref 1.85–7.62)
NEUTS SEG NFR BLD AUTO: 55 % (ref 43–75)
NRBC BLD AUTO-RTO: 0 /100 WBCS
PLATELET # BLD AUTO: 157 THOUSANDS/UL (ref 149–390)
PMV BLD AUTO: 12.6 FL (ref 8.9–12.7)
POTASSIUM SERPL-SCNC: 4 MMOL/L (ref 3.5–5.3)
PROT SERPL-MCNC: 7.2 G/DL (ref 6.4–8.2)
RBC # BLD AUTO: 4 MILLION/UL (ref 3.81–5.12)
SODIUM SERPL-SCNC: 144 MMOL/L (ref 136–145)
TRIGL SERPL-MCNC: 44 MG/DL
TSH SERPL DL<=0.05 MIU/L-ACNC: 2.38 UIU/ML (ref 0.36–3.74)
WBC # BLD AUTO: 5.55 THOUSAND/UL (ref 4.31–10.16)

## 2019-06-13 PROCEDURE — 80061 LIPID PANEL: CPT

## 2019-06-13 PROCEDURE — 85025 COMPLETE CBC W/AUTO DIFF WBC: CPT | Performed by: INTERNAL MEDICINE

## 2019-06-13 PROCEDURE — 84443 ASSAY THYROID STIM HORMONE: CPT | Performed by: INTERNAL MEDICINE

## 2019-06-13 PROCEDURE — 80053 COMPREHEN METABOLIC PANEL: CPT | Performed by: INTERNAL MEDICINE

## 2019-06-13 PROCEDURE — 36415 COLL VENOUS BLD VENIPUNCTURE: CPT | Performed by: INTERNAL MEDICINE

## 2019-07-08 ENCOUNTER — HOSPITAL ENCOUNTER (OUTPATIENT)
Dept: NON INVASIVE DIAGNOSTICS | Facility: CLINIC | Age: 84
Discharge: HOME | End: 2019-07-08
Payer: MEDICARE

## 2019-07-08 DIAGNOSIS — Z95.1 HX OF CABG: ICD-10-CM

## 2019-07-08 DIAGNOSIS — I48.0 PAROXYSMAL ATRIAL FIBRILLATION (HCC): ICD-10-CM

## 2019-07-08 PROCEDURE — 93306 TTE W/DOPPLER COMPLETE: CPT | Performed by: INTERNAL MEDICINE

## 2019-07-08 PROCEDURE — 93306 TTE W/DOPPLER COMPLETE: CPT

## 2019-07-10 ENCOUNTER — OFFICE VISIT (OUTPATIENT)
Dept: FAMILY MEDICINE CLINIC | Facility: HOSPITAL | Age: 84
End: 2019-07-10
Payer: MEDICARE

## 2019-07-10 VITALS
WEIGHT: 123 LBS | HEART RATE: 51 BPM | SYSTOLIC BLOOD PRESSURE: 158 MMHG | DIASTOLIC BLOOD PRESSURE: 78 MMHG | OXYGEN SATURATION: 97 % | HEIGHT: 62 IN | BODY MASS INDEX: 22.63 KG/M2

## 2019-07-10 DIAGNOSIS — B02.29 POST HERPETIC NEURALGIA: ICD-10-CM

## 2019-07-10 DIAGNOSIS — I10 ESSENTIAL HYPERTENSION: Primary | ICD-10-CM

## 2019-07-10 DIAGNOSIS — G44.89 OTHER HEADACHE SYNDROME: ICD-10-CM

## 2019-07-10 PROCEDURE — 99214 OFFICE O/P EST MOD 30 MIN: CPT | Performed by: INTERNAL MEDICINE

## 2019-07-10 RX ORDER — GABAPENTIN 100 MG/1
100 CAPSULE ORAL
COMMUNITY
End: 2020-01-02 | Stop reason: SDUPTHER

## 2019-07-10 NOTE — PATIENT INSTRUCTIONS
Increase demadex to 5 days per week   restart amlodipine at 2 5 mg daily- check bp daily for first 2 weeks then once weekly   try stopping gabapentin in am

## 2019-07-10 NOTE — PROGRESS NOTES
Assessment/Plan:             Problem List Items Addressed This Visit     None            Subjective:      Patient ID: Manjinder Graves is a 80 y o  female    1  Headaches- had been stopped on amlodipine and placed n demadex with Dr Corbin Mcgovern in April- started with headaches since then- genernalized later in day  Using prn tylenol  Now bp is elevated  2  whoozy feeling- was on lyrica for post herpetic neuralgia- [place]  - pain is there all day but at night notices it more when shifting in bed  3  Skin tear- on left elbow near where recent skin cancer      The following portions of the patient's history were reviewed and updated as appropriate: allergies, current medications and problem list      Review of Systems   Constitutional: Negative for appetite change and fatigue  HENT: Negative for congestion  Hearing aides- new ones in past 6 months- significant hearing loss   Respiratory: Negative for apnea and chest tightness  Neurological: Positive for dizziness  All other systems reviewed and are negative          Objective:      Current Outpatient Medications:     amiodarone 200 mg tablet, Take 0 5 tablets (100 mg total) by mouth daily, Disp: 45 tablet, Rfl: 3    apixaban (ELIQUIS) 5 mg, Take 1 tablet (5 mg total) by mouth 2 (two) times a day, Disp: 180 tablet, Rfl: 1    atorvastatin (LIPITOR) 20 mg tablet, Take 1 tablet (20 mg total) by mouth daily, Disp: 90 tablet, Rfl: 3    Coenzyme Q10 (COQ10) 100 MG CAPS, Take 1 capsule by mouth Daily, Disp: , Rfl:     conjugated estrogens (PREMARIN) vaginal cream, Apply a pea size amount of the estrogen cream to the opening of the vagina three nights per week , Disp: , Rfl:     gabapentin (NEURONTIN) 100 mg capsule, Take 100 mg by mouth daily, Disp: , Rfl:     Multiple Vitamins-Minerals (OCUVITE EXTRA) TABS, Take 1 tablet by mouth daily, Disp: , Rfl:     Omega-3 1000 MG CAPS, Take by mouth, Disp: , Rfl:     potassium chloride (K-DUR,KLOR-CON) 10 mEq tablet, Take 1 tablet (10 mEq total) by mouth 2 (two) times a day, Disp: 60 tablet, Rfl: 2    pregabalin (LYRICA) 50 mg capsule, 1 in am and 1 in pm as of 3/25/19, Disp: 180 capsule, Rfl: 0    torsemide (DEMADEX) 20 mg tablet, Take 1 tablet (20 mg total) by mouth daily, Disp: 30 tablet, Rfl: 6    Blood pressure 158/78, pulse (!) 51, height 5' 2" (1 575 m), weight 55 8 kg (123 lb), SpO2 97 %, not currently breastfeeding  Physical Exam   Constitutional: She appears well-developed and well-nourished  No distress  HENT:   Head: Normocephalic  Right Ear: External ear normal    Left Ear: External ear normal    Mouth/Throat: Oropharynx is clear and moist    Eyes: Right eye exhibits no discharge  Left eye exhibits no discharge  Neck: No tracheal deviation present  Cardiovascular: Normal rate and normal heart sounds  No murmur heard  Pulmonary/Chest: Effort normal and breath sounds normal  No stridor  No respiratory distress  She has no wheezes  Abdominal: Soft  Bowel sounds are normal  She exhibits no distension  There is no tenderness  Musculoskeletal: She exhibits edema  Right leg plus23 , left plus 1   Skin:   Skin trear with skin lifted on lateral elbow   Nursing note and vitals reviewed

## 2019-07-11 ENCOUNTER — TELEPHONE (OUTPATIENT)
Dept: FAMILY MEDICINE CLINIC | Facility: HOSPITAL | Age: 84
End: 2019-07-11

## 2019-07-24 ENCOUNTER — TELEPHONE (OUTPATIENT)
Dept: CARDIOLOGY CLINIC | Facility: CLINIC | Age: 84
End: 2019-07-24

## 2019-07-24 NOTE — TELEPHONE ENCOUNTER
Venkata Dunnmon, MR gave message that [place], pt's daughter called for samples of Eliquis  Pt and her daughter had discussion with Dr Merlin Coppola about samples  Dr Merlin Coppola informed them both that samples are not to supply pt's with medications but to start pt's on medications to see if they are able to tolerate the medication  Dr Merlin Coppola stated that if the medication is not affordable then other alternative medications can be discussed  Left VM for daughter reiterating this information  Stating pt was provided with courtesy samples 2 times and that they can call back and we can set up something with the coumadin clinic or we can send a message to Dr Merlin Coppola to see if there is something else he would like or we can send in a new prescription for the Eliquis and to  Please give us a call and let us know what she would like us to do

## 2019-08-06 ENCOUNTER — OFFICE VISIT (OUTPATIENT)
Dept: FAMILY MEDICINE CLINIC | Facility: HOSPITAL | Age: 84
End: 2019-08-06
Payer: MEDICARE

## 2019-08-06 VITALS
WEIGHT: 123 LBS | RESPIRATION RATE: 16 BRPM | HEART RATE: 80 BPM | SYSTOLIC BLOOD PRESSURE: 128 MMHG | HEIGHT: 62 IN | BODY MASS INDEX: 22.63 KG/M2 | DIASTOLIC BLOOD PRESSURE: 70 MMHG | TEMPERATURE: 97.8 F

## 2019-08-06 DIAGNOSIS — N89.8 VAGINAL ITCHING: ICD-10-CM

## 2019-08-06 DIAGNOSIS — R60.0 LOWER EXTREMITY EDEMA: ICD-10-CM

## 2019-08-06 DIAGNOSIS — R19.8 ABDOMINAL FULLNESS: ICD-10-CM

## 2019-08-06 DIAGNOSIS — I35.9 AORTIC VALVE DISORDER: ICD-10-CM

## 2019-08-06 DIAGNOSIS — I10 ESSENTIAL HYPERTENSION: ICD-10-CM

## 2019-08-06 DIAGNOSIS — R30.0 DYSURIA: Primary | ICD-10-CM

## 2019-08-06 DIAGNOSIS — R60.0 LOCALIZED EDEMA: ICD-10-CM

## 2019-08-06 PROCEDURE — 99214 OFFICE O/P EST MOD 30 MIN: CPT | Performed by: INTERNAL MEDICINE

## 2019-08-06 RX ORDER — AMLODIPINE BESYLATE 2.5 MG/1
2.5 TABLET ORAL DAILY
Refills: 0
Start: 2019-08-06 | End: 2019-12-26 | Stop reason: SDUPTHER

## 2019-08-06 RX ORDER — FLUCONAZOLE 150 MG/1
150 TABLET ORAL ONCE
Qty: 2 TABLET | Refills: 0 | Status: SHIPPED | OUTPATIENT
Start: 2019-08-06 | End: 2019-08-06

## 2019-08-06 NOTE — PROGRESS NOTES
Assessment/Plan:             Problem List Items Addressed This Visit        Cardiovascular and Mediastinum    Aortic valve disorder    Relevant Medications    amLODIPine (NORVASC) 2 5 mg tablet    Essential hypertension    Relevant Medications    amLODIPine (NORVASC) 2 5 mg tablet       Other    Lower extremity edema      Other Visit Diagnoses     Dysuria    -  Primary    Relevant Orders    UA w Reflex to Microscopic w Reflex to Culture -Lab Collect    Vaginal itching        Relevant Medications    fluconazole (DIFLUCAN) 150 mg tablet    Other Relevant Orders    UA w Reflex to Microscopic w Reflex to Culture -Lab Collect    Localized edema                Subjective:      Patient ID: Mary Jung is a 80 y o  female    1  Voiding issues- about 5 years ago  seen by urogynecology- improved with steroids with itching- then given cream for chronic yeast infection  2  Chronic pain from post herpetic neuralgia- had injections with some decrease- had tried the duloxitene for about 3 months- helped initially-will hold for now but if flares will give another rx for it- has been off it for 2-3 weeks  Is using lyrica at bedtime- will switch to gabapentin when that is completed due to cost issues- in [place]  3   Balance issues- afraid of falling when going for a ball  4  Abdominal distension--- will have ct done - r/o ascites      The following portions of the patient's history were reviewed and updated as appropriate: allergies, current medications and problem list      Review of Systems   Constitutional: Negative for activity change and fatigue  HENT: Negative for congestion and sinus pressure  Musculoskeletal: Positive for arthralgias  Negative for back pain  Some chronic arthralgias   Skin:        Skin lesion on left leg- to have mohs procedure   Neurological: Positive for dizziness and headaches  Frontal region headache- had restarted amlodipine at 2 5 mg  Improved bp control     Took some tylenol with improvement  Develops by 10 am     All other systems reviewed and are negative  Objective:      Current Outpatient Medications:     amiodarone 200 mg tablet, Take 0 5 tablets (100 mg total) by mouth daily, Disp: 45 tablet, Rfl: 3    apixaban (ELIQUIS) 5 mg, Take 1 tablet (5 mg total) by mouth 2 (two) times a day, Disp: 180 tablet, Rfl: 1    atorvastatin (LIPITOR) 20 mg tablet, Take 1 tablet (20 mg total) by mouth daily, Disp: 90 tablet, Rfl: 3    Coenzyme Q10 (COQ10) 100 MG CAPS, Take 1 capsule by mouth Daily, Disp: , Rfl:     conjugated estrogens (PREMARIN) vaginal cream, Apply a pea size amount of the estrogen cream to the opening of the vagina three nights per week , Disp: , Rfl:     gabapentin (NEURONTIN) 100 mg capsule, Take 100 mg by mouth daily, Disp: , Rfl:     Multiple Vitamins-Minerals (OCUVITE EXTRA) TABS, Take 1 tablet by mouth daily, Disp: , Rfl:     Omega-3 1000 MG CAPS, Take by mouth, Disp: , Rfl:     potassium chloride (K-DUR,KLOR-CON) 10 mEq tablet, Take 1 tablet (10 mEq total) by mouth 2 (two) times a day, Disp: 60 tablet, Rfl: 2    pregabalin (LYRICA) 50 mg capsule, 1 in am and 1 in pm as of 3/25/19, Disp: 180 capsule, Rfl: 0    torsemide (DEMADEX) 20 mg tablet, Take 1 tablet (20 mg total) by mouth daily, Disp: 30 tablet, Rfl: 6    amLODIPine (NORVASC) 2 5 mg tablet, Take 1 tablet (2 5 mg total) by mouth daily, Disp: , Rfl: 0    fluconazole (DIFLUCAN) 150 mg tablet, Take 1 tablet (150 mg total) by mouth once for 1 dose, Disp: 2 tablet, Rfl: 0    Blood pressure 128/70, pulse 80, temperature 97 8 °F (36 6 °C), temperature source Tympanic, resp  rate 16, height 5' 2" (1 575 m), weight 55 8 kg (123 lb), not currently breastfeeding  Physical Exam   Constitutional: She is oriented to person, place, and time  She appears well-developed and well-nourished  No distress  HENT:   Head: Normocephalic     Right Ear: External ear normal    Left Ear: External ear normal  Mouth/Throat: Oropharynx is clear and moist    Eyes: Right eye exhibits no discharge  Left eye exhibits no discharge  Neck: No tracheal deviation present  Cardiovascular: Normal rate and normal heart sounds  No murmur heard  Pulmonary/Chest: Effort normal and breath sounds normal  No stridor  No respiratory distress  She has no wheezes  Abdominal: Soft  Bowel sounds are normal  She exhibits no distension  There is no tenderness  Musculoskeletal: She exhibits edema  Right leg plus1 , left trace    Neurological: She is alert and oriented to person, place, and time  She displays normal reflexes  Skin: No erythema  Skin trear with skin lifted on lateral elbow   Nursing note and vitals reviewed

## 2019-08-07 ENCOUNTER — APPOINTMENT (OUTPATIENT)
Dept: LAB | Facility: HOSPITAL | Age: 84
End: 2019-08-07
Attending: INTERNAL MEDICINE
Payer: MEDICARE

## 2019-08-07 LAB
BACTERIA UR QL AUTO: ABNORMAL /HPF
BILIRUB UR QL STRIP: NEGATIVE
CLARITY UR: CLEAR
COLOR UR: YELLOW
GLUCOSE UR STRIP-MCNC: NEGATIVE MG/DL
HGB UR QL STRIP.AUTO: ABNORMAL
KETONES UR STRIP-MCNC: NEGATIVE MG/DL
LEUKOCYTE ESTERASE UR QL STRIP: ABNORMAL
NITRITE UR QL STRIP: NEGATIVE
NON-SQ EPI CELLS URNS QL MICRO: ABNORMAL /HPF
PH UR STRIP.AUTO: 6.5 [PH]
PROT UR STRIP-MCNC: NEGATIVE MG/DL
RBC #/AREA URNS AUTO: ABNORMAL /HPF
SP GR UR STRIP.AUTO: <=1.005 (ref 1–1.03)
UROBILINOGEN UR QL STRIP.AUTO: 0.2 E.U./DL
WBC #/AREA URNS AUTO: ABNORMAL /HPF

## 2019-08-07 PROCEDURE — 87077 CULTURE AEROBIC IDENTIFY: CPT | Performed by: INTERNAL MEDICINE

## 2019-08-07 PROCEDURE — 87086 URINE CULTURE/COLONY COUNT: CPT | Performed by: INTERNAL MEDICINE

## 2019-08-07 PROCEDURE — 81001 URINALYSIS AUTO W/SCOPE: CPT | Performed by: INTERNAL MEDICINE

## 2019-08-07 PROCEDURE — 87186 SC STD MICRODIL/AGAR DIL: CPT | Performed by: INTERNAL MEDICINE

## 2019-08-09 DIAGNOSIS — N39.0 PSEUDOMONAS URINARY TRACT INFECTION: Primary | ICD-10-CM

## 2019-08-09 DIAGNOSIS — B96.5 PSEUDOMONAS URINARY TRACT INFECTION: Primary | ICD-10-CM

## 2019-08-09 RX ORDER — CIPROFLOXACIN 250 MG/1
250 TABLET, FILM COATED ORAL EVERY 12 HOURS SCHEDULED
Qty: 14 TABLET | Refills: 0 | Status: SHIPPED | OUTPATIENT
Start: 2019-08-09 | End: 2019-08-16

## 2019-08-11 LAB
BACTERIA UR CULT: ABNORMAL

## 2019-08-21 ENCOUNTER — HOSPITAL ENCOUNTER (OUTPATIENT)
Dept: CT IMAGING | Facility: HOSPITAL | Age: 84
Discharge: HOME | End: 2019-08-21
Attending: INTERNAL MEDICINE
Payer: MEDICARE

## 2019-08-21 DIAGNOSIS — R19.8 ABDOMINAL FULLNESS: ICD-10-CM

## 2019-08-21 PROCEDURE — 74176 CT ABD & PELVIS W/O CONTRAST: CPT

## 2019-08-23 ENCOUNTER — OFFICE VISIT (OUTPATIENT)
Dept: FAMILY MEDICINE CLINIC | Facility: HOSPITAL | Age: 84
End: 2019-08-23
Payer: MEDICARE

## 2019-08-23 VITALS
WEIGHT: 122.6 LBS | HEART RATE: 55 BPM | SYSTOLIC BLOOD PRESSURE: 116 MMHG | BODY MASS INDEX: 22.56 KG/M2 | HEIGHT: 62 IN | DIASTOLIC BLOOD PRESSURE: 64 MMHG

## 2019-08-23 DIAGNOSIS — L03.114 CELLULITIS OF LEFT ARM: ICD-10-CM

## 2019-08-23 DIAGNOSIS — R60.0 LOWER EXTREMITY EDEMA: ICD-10-CM

## 2019-08-23 DIAGNOSIS — R35.1 NOCTURIA: ICD-10-CM

## 2019-08-23 DIAGNOSIS — R35.0 URINARY FREQUENCY: Primary | ICD-10-CM

## 2019-08-23 DIAGNOSIS — N94.9 VAGINAL BURNING: ICD-10-CM

## 2019-08-23 DIAGNOSIS — B02.29 POST HERPETIC NEURALGIA: ICD-10-CM

## 2019-08-23 DIAGNOSIS — I48.0 PAROXYSMAL ATRIAL FIBRILLATION (HCC): ICD-10-CM

## 2019-08-23 LAB
SL AMB  POCT GLUCOSE, UA: NORMAL
SL AMB LEUKOCYTE ESTERASE,UA: NORMAL
SL AMB POCT BILIRUBIN,UA: NORMAL
SL AMB POCT BLOOD,UA: NORMAL
SL AMB POCT CLARITY,UA: CLEAR
SL AMB POCT COLOR,UA: NORMAL
SL AMB POCT KETONES,UA: NORMAL
SL AMB POCT NITRITE,UA: NORMAL
SL AMB POCT PH,UA: 7
SL AMB POCT SPECIFIC GRAVITY,UA: 1
SL AMB POCT URINE PROTEIN: NORMAL
SL AMB POCT UROBILINOGEN: 3.5

## 2019-08-23 PROCEDURE — 81002 URINALYSIS NONAUTO W/O SCOPE: CPT | Performed by: INTERNAL MEDICINE

## 2019-08-23 PROCEDURE — 99213 OFFICE O/P EST LOW 20 MIN: CPT | Performed by: INTERNAL MEDICINE

## 2019-08-23 RX ORDER — CEPHALEXIN 250 MG/1
500 CAPSULE ORAL EVERY 8 HOURS SCHEDULED
Qty: 30 CAPSULE | Refills: 0 | Status: SHIPPED | OUTPATIENT
Start: 2019-08-23 | End: 2019-08-28

## 2019-08-23 RX ORDER — AMOXICILLIN 500 MG/1
CAPSULE ORAL
COMMUNITY
Start: 2019-08-16 | End: 2019-08-23 | Stop reason: ALTCHOICE

## 2019-08-23 RX ORDER — CLOTRIMAZOLE AND BETAMETHASONE DIPROPIONATE 10; .64 MG/G; MG/G
CREAM TOPICAL 2 TIMES DAILY
Qty: 45 G | Refills: 1 | Status: SHIPPED | OUTPATIENT
Start: 2019-08-23 | End: 2020-03-25 | Stop reason: HOSPADM

## 2019-08-23 RX ORDER — FLUCONAZOLE 150 MG/1
TABLET ORAL
COMMUNITY
Start: 2019-08-06 | End: 2019-08-23 | Stop reason: ALTCHOICE

## 2019-08-23 NOTE — PROGRESS NOTES
Assessment/Plan:             Problem List Items Addressed This Visit        Cardiovascular and Mediastinum    Paroxysmal atrial fibrillation (HCC)     No episodes of heart racing- no detected palpitiations- hr 55            Nervous and Auditory    Post herpetic neuralgia     Chronic pain            Other    Lower extremity edema     Using diuretic every other day- slightly less edema - some  lightheaded ness at times  Other Visit Diagnoses     Urinary frequency    -  Primary    Relevant Orders    POCT urine dip    Ambulatory referral to Urology    Nocturia        Relevant Orders    Ambulatory referral to Urology    Vaginal burning        Relevant Medications    clotrimazole-betamethasone (LOTRISONE) 1-0 05 % cream            Subjective:      Patient ID: Anna Cherry is a 80 y o  female    HPI    The following portions of the patient's history were reviewed and updated as appropriate: allergies, current medications and problem list      Review of Systems   Constitutional: Negative for chills and fever  Genitourinary: Positive for difficulty urinating           Objective:      Current Outpatient Medications:     amiodarone 200 mg tablet, Take 0 5 tablets (100 mg total) by mouth daily, Disp: 45 tablet, Rfl: 3    amLODIPine (NORVASC) 2 5 mg tablet, Take 1 tablet (2 5 mg total) by mouth daily, Disp: , Rfl: 0    apixaban (ELIQUIS) 5 mg, Take 1 tablet (5 mg total) by mouth 2 (two) times a day, Disp: 180 tablet, Rfl: 1    atorvastatin (LIPITOR) 20 mg tablet, Take 1 tablet (20 mg total) by mouth daily, Disp: 90 tablet, Rfl: 3    Coenzyme Q10 (COQ10) 100 MG CAPS, Take 1 capsule by mouth Daily, Disp: , Rfl:     conjugated estrogens (PREMARIN) vaginal cream, Apply a pea size amount of the estrogen cream to the opening of the vagina three nights per week , Disp: , Rfl:     gabapentin (NEURONTIN) 100 mg capsule, Take 100 mg by mouth daily, Disp: , Rfl:     Multiple Vitamins-Minerals (OCUVITE EXTRA) TABS, Take 1 tablet by mouth daily, Disp: , Rfl:     Omega-3 1000 MG CAPS, Take by mouth, Disp: , Rfl:     potassium chloride (K-DUR,KLOR-CON) 10 mEq tablet, Take 1 tablet (10 mEq total) by mouth 2 (two) times a day, Disp: 60 tablet, Rfl: 2    pregabalin (LYRICA) 50 mg capsule, 1 in am and 1 in pm as of 3/25/19, Disp: 180 capsule, Rfl: 0    torsemide (DEMADEX) 20 mg tablet, Take 1 tablet (20 mg total) by mouth daily, Disp: 30 tablet, Rfl: 6    clotrimazole-betamethasone (LOTRISONE) 1-0 05 % cream, Apply topically 2 (two) times a day, Disp: 45 g, Rfl: 1    Blood pressure 116/64, pulse 55, height 5' 2" (1 575 m), weight 55 6 kg (122 lb 9 6 oz), not currently breastfeeding  Physical Exam   Constitutional: She appears well-developed and well-nourished  No distress  HENT:   Head: Normocephalic  Right Ear: External ear normal    Left Ear: External ear normal    Eyes: Right eye exhibits no discharge  Left eye exhibits no discharge  No scleral icterus  Cardiovascular: Normal rate and regular rhythm  Murmur heard  [place] valve sounds   Pulmonary/Chest: Effort normal and breath sounds normal  No respiratory distress  She has no wheezes  Abdominal: Soft  Bowel sounds are normal  She exhibits no distension  There is no tenderness  Lymphadenopathy:     She has no cervical adenopathy  Skin: There is erythema  Left forearm  Lesion removed recently by dermatology-now some redness in surrounding    Nursing note and vitals reviewed

## 2019-09-03 ENCOUNTER — TELEPHONE (OUTPATIENT)
Dept: FAMILY MEDICINE CLINIC | Facility: HOSPITAL | Age: 84
End: 2019-09-03

## 2019-09-03 DIAGNOSIS — M25.562 ACUTE PAIN OF LEFT KNEE: Primary | ICD-10-CM

## 2019-09-03 NOTE — TELEPHONE ENCOUNTER
PT called -she has an appt with Ortho on Wed  - will wait and see if they order x-rays - if not will have this one done

## 2019-09-04 ENCOUNTER — OFFICE VISIT (OUTPATIENT)
Dept: OBGYN CLINIC | Facility: CLINIC | Age: 84
End: 2019-09-04
Payer: MEDICARE

## 2019-09-04 ENCOUNTER — APPOINTMENT (OUTPATIENT)
Dept: RADIOLOGY | Facility: CLINIC | Age: 84
End: 2019-09-04
Payer: MEDICARE

## 2019-09-04 VITALS
DIASTOLIC BLOOD PRESSURE: 78 MMHG | BODY MASS INDEX: 22.45 KG/M2 | WEIGHT: 122 LBS | HEIGHT: 62 IN | HEART RATE: 72 BPM | SYSTOLIC BLOOD PRESSURE: 140 MMHG

## 2019-09-04 DIAGNOSIS — G89.29 CHRONIC PAIN OF LEFT KNEE: ICD-10-CM

## 2019-09-04 DIAGNOSIS — M25.562 CHRONIC PAIN OF LEFT KNEE: ICD-10-CM

## 2019-09-04 DIAGNOSIS — M70.52 PES ANSERINUS BURSITIS OF LEFT KNEE: Primary | ICD-10-CM

## 2019-09-04 PROCEDURE — 99203 OFFICE O/P NEW LOW 30 MIN: CPT | Performed by: ORTHOPAEDIC SURGERY

## 2019-09-04 PROCEDURE — 73564 X-RAY EXAM KNEE 4 OR MORE: CPT

## 2019-09-04 PROCEDURE — 20610 DRAIN/INJ JOINT/BURSA W/O US: CPT | Performed by: ORTHOPAEDIC SURGERY

## 2019-09-04 RX ORDER — BETAMETHASONE SODIUM PHOSPHATE AND BETAMETHASONE ACETATE 3; 3 MG/ML; MG/ML
12 INJECTION, SUSPENSION INTRA-ARTICULAR; INTRALESIONAL; INTRAMUSCULAR; SOFT TISSUE
Status: COMPLETED | OUTPATIENT
Start: 2019-09-04 | End: 2019-09-04

## 2019-09-04 RX ORDER — LIDOCAINE HYDROCHLORIDE 10 MG/ML
1 INJECTION, SOLUTION EPIDURAL; INFILTRATION; INTRACAUDAL; PERINEURAL
Status: COMPLETED | OUTPATIENT
Start: 2019-09-04 | End: 2019-09-04

## 2019-09-04 RX ORDER — LIDOCAINE HYDROCHLORIDE 10 MG/ML
7 INJECTION, SOLUTION EPIDURAL; INFILTRATION; INTRACAUDAL; PERINEURAL
Status: COMPLETED | OUTPATIENT
Start: 2019-09-04 | End: 2019-09-04

## 2019-09-04 RX ORDER — BETAMETHASONE SODIUM PHOSPHATE AND BETAMETHASONE ACETATE 3; 3 MG/ML; MG/ML
6 INJECTION, SUSPENSION INTRA-ARTICULAR; INTRALESIONAL; INTRAMUSCULAR; SOFT TISSUE
Status: COMPLETED | OUTPATIENT
Start: 2019-09-04 | End: 2019-09-04

## 2019-09-04 RX ADMIN — LIDOCAINE HYDROCHLORIDE 1 ML: 10 INJECTION, SOLUTION EPIDURAL; INFILTRATION; INTRACAUDAL; PERINEURAL at 12:34

## 2019-09-04 RX ADMIN — BETAMETHASONE SODIUM PHOSPHATE AND BETAMETHASONE ACETATE 6 MG: 3; 3 INJECTION, SUSPENSION INTRA-ARTICULAR; INTRALESIONAL; INTRAMUSCULAR; SOFT TISSUE at 12:33

## 2019-09-04 RX ADMIN — BETAMETHASONE SODIUM PHOSPHATE AND BETAMETHASONE ACETATE 12 MG: 3; 3 INJECTION, SUSPENSION INTRA-ARTICULAR; INTRALESIONAL; INTRAMUSCULAR; SOFT TISSUE at 12:34

## 2019-09-04 RX ADMIN — LIDOCAINE HYDROCHLORIDE 7 ML: 10 INJECTION, SOLUTION EPIDURAL; INFILTRATION; INTRACAUDAL; PERINEURAL at 12:33

## 2019-09-04 NOTE — ASSESSMENT & PLAN NOTE
80 y o  female with left knee pain and pes anserinus bursitis, possible medial meniscus tear  A left knee pes anserinus was provided today as well as a joint CSI  Jacky Ribeiro elected to proceed  Both CSI were performed in the office today, without complication  Patient's pain appeared to be improved after the injection  She should use a cold compress tonight for aprox  10-15 minutes  Activities as tolerated  She was instructed to work on stretching exercises at home  Some exercises were shown in the office today  Discussed with Jacky Ribeiro that she may have a medial meniscus tear, but she denies any trama  If her symptoms do not improve with regards to the catching sensation a left knee MRI may be ordered to evaluate her meniscus  If she does have a meniscus tear and continued to have mechanical symptoms, surgical intervention may be discussed  She would have to stop her Eliques prior to surgical intervention  Maritzadebbie Ribeiro may use Aspercreme over the medial aspect of her knee  The CSI may be repeated every 3 months as needed  All patient's questions were answered to their satisfaction  This note is created using dictation transcription  It may contain typographical errors, grammatical errors, improperly dictated words, background noise and other errors

## 2019-09-04 NOTE — PROGRESS NOTES
Assessment:     1  Pes anserinus bursitis of left knee    2  Chronic pain of left knee        Plan:     Problem List Items Addressed This Visit        Musculoskeletal and Integument    Pes anserinus bursitis of left knee - Primary     80 y o  female with left knee pain and pes anserinus bursitis, possible medial meniscus tear  A left knee pes anserinus was provided today as well as a joint CSI  Ayannarod Ignacio elected to proceed  Both CSI were performed in the office today, without complication  Patient's pain appeared to be improved after the injection  She should use a cold compress tonight for aprox  10-15 minutes  Activities as tolerated  She was instructed to work on stretching exercises at home  Some exercises were shown in the office today  Discussed with Ayanna Half that she may have a medial meniscus tear, but she denies any trama  If her symptoms do not improve with regards to the catching sensation a left knee MRI may be ordered to evaluate her meniscus  If she does have a meniscus tear and continued to have mechanical symptoms, surgical intervention may be discussed  She would have to stop her Eliques prior to surgical intervention  Ayanna Half may use Aspercreme over the medial aspect of her knee  The CSI may be repeated every 3 months as needed  All patient's questions were answered to their satisfaction  This note is created using dictation transcription  It may contain typographical errors, grammatical errors, improperly dictated words, background noise and other errors           Relevant Medications    lidocaine (PF) (XYLOCAINE-MPF) 1 % injection 1 mL (Completed)    betamethasone acetate-betamethasone sodium phosphate (CELESTONE) injection 12 mg (Completed)    Other Relevant Orders    Large joint arthrocentesis: L pes anserine bursa (Completed)      Other Visit Diagnoses     Chronic pain of left knee        Relevant Medications    lidocaine (PF) (XYLOCAINE-MPF) 1 % injection 7 mL (Completed)    betamethasone acetate-betamethasone sodium phosphate (CELESTONE) injection 6 mg (Completed)    Other Relevant Orders    XR knee 4+ vw left injury    Large joint arthrocentesis: L knee (Completed)           Subjective:     Patient ID: Tamie Fortune is a 80 y o  female  Chief Complaint:  80 y o  female presents to the office today for left knee pain  Gayatri Grajeda states that she has been experiencing medial left knee pain for aprox  1 week  She denies any injury or trauma  She states her knee pain is worse with WB activities and is better at rest   She also has increased pain with climbing  Pain is sharp at times along the inner aspect of her knee  She denies locking but has sensation of giving out  Patient cannot take NSAIDs  She is taking Tylenol as needed for pain control  Information on patient's intake form was reviewed      Allergy:  Allergies   Allergen Reactions    Heparin      Annotation - [id number]: LOW PLATELETS    Phenazopyridine      As per daughter "Not appropriate for pt due to heart condition"     Medications:  all current active meds have been reviewed  Past Medical History:  Past Medical History:   Diagnosis Date    [facility] [address] [facility]     Atrial fibrillation ([facility] [address] [facility]     CAD (coronary artery disease)     Cellulitis      AND ABSCESS    Cholecystitis     Closed Colles' fracture     OF THE LEFT WRIST; LAST ASSESSED: 5/2/14    Closed fracture of radius     LAST ASSESSED: 2/19/14    Dyspnea     Hair loss disorder     Hematuria     History of being hospitalized     1/5/12-1/14/12 MARCE ROSARIO  [facility] [place] PROCEDURES: 1) REDO STERNOTOMY AND AORTIC VALVE REPLACEMENT WITH A 19-MM REBA-PETERS BOVINIE PERICARDIAL MAGNA EASE VALVE 2) TRANSESOPHAGEAL ECHOCARDIOGRAM     Hypercholesterolemia     Hypertension     Incomplete bladder emptying     Leg wound, left     Malaise and fatigue     Sick sinus syndrome (HCC)     Urinary tract infection      Past Surgical History:  Past Surgical History:   Procedure Laterality Date    AORTIC VALVE REPLACEMENT  01/2012    HEART VALVE    CARDIAC SURGERY      CATARACT EXTRACTION W/  INTRAOCULAR LENS IMPLANT  08/26/2010    Luann Cormier MD; PHACOEMULISIFICATION; INSERTION INTRAOCULAR LENS OD    CORONARY ARTERY BYPASS GRAFT  01/2005     Family History:  Family History   Problem Relation Age of Onset    Diabetes Mother         MELLITUS    Hypertension Mother     Heart disease Mother     Prostate cancer Father     Dementia Brother     Heart disease Maternal Grandmother     Hypertension Maternal Grandmother     Heart disease Maternal Grandfather     Hypertension Family     Osteoporosis Family     Substance Abuse Neg Hx     Mental illness Neg Hx      Social History:  Social History     Substance and Sexual Activity   Alcohol Use Yes    Comment: social     Social History     Substance and Sexual Activity   Drug Use No     Social History     Tobacco Use   Smoking Status Former Smoker   Smokeless Tobacco Never Used   Tobacco Comment    QUIT 40 YEARS AGO ALSO NEVER A SMOKER AS PER [facility]     Review of Systems   Constitutional: Negative for chills, fever and unexpected weight change  HENT: Negative for hearing loss, nosebleeds and sore throat  Eyes: Negative for pain, redness and visual disturbance  Respiratory: Negative for cough, shortness of breath and wheezing  Cardiovascular: Negative for chest pain, palpitations and leg swelling  Gastrointestinal: Negative for abdominal pain, nausea and vomiting  Endocrine: Negative for polydipsia and polyuria  Genitourinary: Negative for difficulty urinating and hematuria  Musculoskeletal: Positive for arthralgias (Left knee)  Negative for joint swelling and myalgias  Skin: Negative for rash and wound  Neurological: Negative for dizziness, numbness and headaches  Hematological: Bruises/bleeds easily  Psychiatric/Behavioral: Negative for decreased concentration, dysphoric mood and suicidal ideas   The patient is not nervous/anxious  Objective:  BP Readings from Last 1 Encounters:   09/04/19 140/78      Wt Readings from Last 1 Encounters:   09/04/19 55 3 kg (122 lb)      BMI:   Estimated body mass index is 22 31 kg/m² as calculated from the following:    Height as of this encounter: 5' 2" (1 575 m)  Weight as of this encounter: 55 3 kg (122 lb)  BSA:   Estimated body surface area is 1 55 meters squared as calculated from the following:    Height as of this encounter: 5' 2" (1 575 m)  Weight as of this encounter: 55 3 kg (122 lb)  Physical Exam   Constitutional: She is oriented to person, place, and time  She appears well-developed and well-nourished  HENT:   Head: Normocephalic and atraumatic  Eyes: Conjunctivae and EOM are normal    Neck: Neck supple  Pulmonary/Chest: Effort normal    Musculoskeletal:        Left knee: She exhibits no effusion  Neurological: She is alert and oriented to person, place, and time  Skin: Skin is warm and dry  Psychiatric: She has a normal mood and affect  Nursing note and vitals reviewed  Left Knee Exam     Muscle Strength   The patient has normal left knee strength  Tenderness   The patient is experiencing tenderness in the pes anserinus and medial joint line      Range of Motion   Extension: normal   Flexion: normal     Tests   Pasquale:  Medial - positive Lateral - negative  Varus: negative Valgus: negative  Patellar apprehension: negative    Other   Erythema: absent  Scars: present  Sensation: normal  Pulse: present  Swelling: none  Effusion: no effusion present              Large joint arthrocentesis: L knee  Date/Time: 9/4/2019 12:33 PM  Consent given by: patient  Site marked: site marked  Timeout: Immediately prior to procedure a time out was called to verify the correct patient, procedure, equipment, support staff and site/side marked as required   Supporting Documentation  Indications: pain   Procedure Details  Location: knee - L knee  Needle size: 22 G  Ultrasound guidance: no  Approach: anterolateral  Medications administered: 7 mL lidocaine (PF) 1 %; 6 mg betamethasone acetate-betamethasone sodium phosphate 6 (3-3) mg/mL    Patient tolerance: patient tolerated the procedure well with no immediate complications  Dressing:  Sterile dressing applied    Large joint arthrocentesis: L pes anserine bursa  Date/Time: 9/4/2019 12:34 PM  Consent given by: patient  Site marked: site marked  Timeout: Immediately prior to procedure a time out was called to verify the correct patient, procedure, equipment, support staff and site/side marked as required   Supporting Documentation  Indications: pain   Procedure Details  Location: knee - L pes anserine bursa  Preparation: Patient was prepped and draped in the usual sterile fashion  Needle size: 25 G  Ultrasound guidance: no  Medications administered: 1 mL lidocaine (PF) 1 %; 12 mg betamethasone acetate-betamethasone sodium phosphate 6 (3-3) mg/mL    Patient tolerance: patient tolerated the procedure well with no immediate complications  Dressing:  Sterile dressing applied        I have personally reviewed pertinent films in PACS and my interpretation is no fracture or dislocation, no osteoarthritis      Scribe Attestation    I,:   [place] am acting as a scribe while in the presence of the attending physician :        I,:   Rolnada Robertson MD personally performed the services described in this documentation    as scribed in my presence :

## 2019-09-06 ENCOUNTER — TELEPHONE (OUTPATIENT)
Dept: FAMILY MEDICINE CLINIC | Facility: HOSPITAL | Age: 84
End: 2019-09-06

## 2019-09-09 ENCOUNTER — TELEPHONE (OUTPATIENT)
Dept: OTHER | Facility: OTHER | Age: 84
End: 2019-09-09

## 2019-09-09 ENCOUNTER — TELEPHONE (OUTPATIENT)
Dept: OBGYN CLINIC | Facility: HOSPITAL | Age: 84
End: 2019-09-09

## 2019-09-09 ENCOUNTER — HOSPITAL ENCOUNTER (EMERGENCY)
Facility: HOSPITAL | Age: 84
Discharge: HOME | End: 2019-09-10
Attending: EMERGENCY MEDICINE | Admitting: EMERGENCY MEDICINE
Payer: MEDICARE

## 2019-09-09 VITALS
BODY MASS INDEX: 20.55 KG/M2 | TEMPERATURE: 99.1 F | SYSTOLIC BLOOD PRESSURE: 178 MMHG | HEIGHT: 63 IN | WEIGHT: 116 LBS | HEART RATE: 65 BPM | DIASTOLIC BLOOD PRESSURE: 97 MMHG | OXYGEN SATURATION: 98 % | RESPIRATION RATE: 16 BRPM

## 2019-09-09 DIAGNOSIS — M25.562 ACUTE PAIN OF LEFT KNEE: Primary | ICD-10-CM

## 2019-09-09 DIAGNOSIS — G89.29 CHRONIC PAIN OF LEFT KNEE: Primary | ICD-10-CM

## 2019-09-09 DIAGNOSIS — M25.562 CHRONIC PAIN OF LEFT KNEE: Primary | ICD-10-CM

## 2019-09-09 PROCEDURE — 99284 EMERGENCY DEPT VISIT MOD MDM: CPT | Performed by: EMERGENCY MEDICINE

## 2019-09-09 PROCEDURE — 99283 EMERGENCY DEPT VISIT LOW MDM: CPT

## 2019-09-09 RX ORDER — OXYCODONE HYDROCHLORIDE AND ACETAMINOPHEN 5; 325 MG/1; MG/1
0.5 TABLET ORAL ONCE
Status: COMPLETED | OUTPATIENT
Start: 2019-09-09 | End: 2019-09-09

## 2019-09-09 RX ORDER — TRAMADOL HYDROCHLORIDE 300 MG/1
325 TABLET, EXTENDED RELEASE ORAL DAILY
COMMUNITY
End: 2019-09-10

## 2019-09-09 RX ORDER — LIDOCAINE 50 MG/G
1 PATCH TOPICAL ONCE
Status: DISCONTINUED | OUTPATIENT
Start: 2019-09-09 | End: 2019-09-10 | Stop reason: HOSPADM

## 2019-09-09 RX ADMIN — OXYCODONE HYDROCHLORIDE AND ACETAMINOPHEN 0.5 TABLET: 5; 325 TABLET ORAL at 23:16

## 2019-09-09 RX ADMIN — LIDOCAINE 1 PATCH: 50 PATCH CUTANEOUS at 22:12

## 2019-09-09 NOTE — TELEPHONE ENCOUNTER
[place]  [phone]    Dr Charito Cobos    Patient's daughter reports pain level 9 since injection  Scheduled appt for tomorrow with you and is asking for RX for MRI that was previously discussed

## 2019-09-09 NOTE — TELEPHONE ENCOUNTER
MRI left knee ordered  Please schedule for her  Her appointment can be rescheduled until MRI is complete  Thanks

## 2019-09-10 ENCOUNTER — HOSPITAL ENCOUNTER (OUTPATIENT)
Dept: MRI IMAGING | Facility: HOSPITAL | Age: 84
Discharge: HOME | End: 2019-09-10
Payer: MEDICARE

## 2019-09-10 ENCOUNTER — OFFICE VISIT (OUTPATIENT)
Dept: OBGYN CLINIC | Facility: CLINIC | Age: 84
End: 2019-09-10
Payer: MEDICARE

## 2019-09-10 VITALS
SYSTOLIC BLOOD PRESSURE: 152 MMHG | BODY MASS INDEX: 21.53 KG/M2 | HEIGHT: 62 IN | WEIGHT: 117 LBS | HEART RATE: 78 BPM | DIASTOLIC BLOOD PRESSURE: 82 MMHG

## 2019-09-10 DIAGNOSIS — M25.562 ACUTE PAIN OF LEFT KNEE: Primary | ICD-10-CM

## 2019-09-10 DIAGNOSIS — M25.562 ACUTE PAIN OF LEFT KNEE: ICD-10-CM

## 2019-09-10 PROCEDURE — 73721 MRI JNT OF LWR EXTRE W/O DYE: CPT

## 2019-09-10 PROCEDURE — 99213 OFFICE O/P EST LOW 20 MIN: CPT | Performed by: ORTHOPAEDIC SURGERY

## 2019-09-10 RX ORDER — OXYCODONE HYDROCHLORIDE AND ACETAMINOPHEN 5; 325 MG/1; MG/1
0.5 TABLET ORAL EVERY 4 HOURS PRN
Qty: 10 TABLET | Refills: 0 | Status: SHIPPED | OUTPATIENT
Start: 2019-09-10 | End: 2019-09-20

## 2019-09-10 NOTE — TELEPHONE ENCOUNTER
Coleman Rabagomorgan 7/30/1929  Call Id: [id number]  [employer] Name: Riya Marquis  Relationship To  Patient: Daughter  Return Phone Number: [phone] (Home)  Presenting Problem: "My mother is having intense pain in her knee "  Protocol Title Nurse Date/Time  Disp  Time Disposition Final User  9/9/2019 9:30:24 PM Close Yes JUAN R Ferraro, Vitaliy Keating  Comments  User: Karlos Vega RN Date/Time: 9/9/2019 9:30:17 PM  I received verbal permission from the patient to speak with Yoavdiana Nasra (daughter)  Gino Hammonds stated that  they are getting ready to go to the ER  I offered to triage the patient, but informed her that the on call physician will not be able to prescribed anything stronger for the pain than what the patient takes at home or over the counter medications  Riya Marquis declined  the triage

## 2019-09-10 NOTE — ED PROVIDER NOTES
History  Chief Complaint   Patient presents with    Knee Pain     patient presents to the ED with c/o left knee pain  patient recently had an injection in her knee with no relief of pain      81 yo F with PMH of CAD, afib, HTN, HLD presents from home with her daughters for evaluation of left knee pain  Has been an ongoing and gradually worsening issue  Is following w/ ortho for this, had a knee injection a few days ago which did not help  Pain is not acutely worse since injection  No fevers/chills  No numbness/tingling  No injury  Has an MRI ordered outpt, and has f/u scheduled with ortho tmrw AM        History provided by:  Patient   used: No    Knee Pain   Location:  Knee  Injury: no    Knee location:  L knee  Pain details:     Quality:  Aching    Radiates to:  Does not radiate    Severity:  Severe    Onset quality:  Gradual    Timing:  Constant    Progression:  Worsening  Chronicity:  New  Dislocation: no    Foreign body present:  No foreign bodies  Prior injury to area:  No  Relieved by:  Nothing  Ineffective treatments: tramadol, ice, rest, gabapentin  Associated symptoms: no back pain, no fatigue, no fever and no neck pain    Risk factors: no concern for non-accidental trauma, no frequent fractures, no known bone disorder, no obesity and no recent illness        Prior to Admission Medications   Prescriptions Last Dose Informant Patient Reported? Taking?    Coenzyme Q10 (COQ10) 100 MG CAPS  Self Yes No   Sig: Take 1 capsule by mouth Daily   Multiple Vitamins-Minerals (OCUVITE EXTRA) TABS  Self Yes No   Sig: Take 1 tablet by mouth daily   Omega-3 1000 MG CAPS  Self Yes No   Sig: Take by mouth   amLODIPine (NORVASC) 2 5 mg tablet  Self No No   Sig: Take 1 tablet (2 5 mg total) by mouth daily   amiodarone 200 mg tablet  Self No No   Sig: Take 0 5 tablets (100 mg total) by mouth daily   apixaban (ELIQUIS) 5 mg  Self No No   Sig: Take 1 tablet (5 mg total) by mouth 2 (two) times a day atorvastatin (LIPITOR) 20 mg tablet  Self No No   Sig: Take 1 tablet (20 mg total) by mouth daily   clotrimazole-betamethasone (LOTRISONE) 1-0 05 % cream  Self No No   Sig: Apply topically 2 (two) times a day   conjugated estrogens (PREMARIN) vaginal cream  Self Yes No   Sig: Apply a pea size amount of the estrogen cream to the opening of the vagina three nights per week    gabapentin (NEURONTIN) 100 mg capsule  Self Yes No   Sig: Take 100 mg by mouth daily   potassium chloride (K-DUR,KLOR-CON) 10 mEq tablet  Self No No   Sig: Take 1 tablet (10 mEq total) by mouth 2 (two) times a day   pregabalin (LYRICA) 50 mg capsule  Self No No   Si in am and 1 in pm as of 3/25/19   torsemide (DEMADEX) 20 mg tablet  Self No No   Sig: Take 1 tablet (20 mg total) by mouth daily   traMADol (ULTRAM-ER) 300 MG 24 hr tablet   Yes Yes   Sig: Take 325 mg by mouth daily      Facility-Administered Medications: None       Past Medical History:   Diagnosis Date    [facility] infarction [facility] [address] )     Atrial fibrillation ([facility] [address] [facility]     CAD (coronary artery disease)     Cellulitis      AND ABSCESS    Cholecystitis     Closed Colles' fracture     OF THE LEFT WRIST; LAST ASSESSED: 14    Closed fracture of radius     LAST ASSESSED: 14    Dyspnea     Hair loss disorder     Hematuria     History of being hospitalized     12-12 MARCE ROSARIO  [facility] PROCEDURES: 1) REDO STERNOTOMY AND AORTIC VALVE REPLACEMENT WITH A 19-MM REBA-PETERS BOVINIE PERICARDIAL MAGNA EASE VALVE 2) TRANSESOPHAGEAL ECHOCARDIOGRAM     Hypercholesterolemia     Hypertension     Incomplete bladder emptying     Leg wound, left     Malaise and fatigue     Sick sinus syndrome (HCC)     Urinary tract infection        Past Surgical History:   Procedure Laterality Date    AORTIC VALVE REPLACEMENT  2012    HEART VALVE    CARDIAC SURGERY      CATARACT EXTRACTION W/  INTRAOCULAR LENS IMPLANT  2010    Krishna Dent MD; PHACOEMULISIFICATION; INSERTION INTRAOCULAR LENS OD    CORONARY ARTERY BYPASS GRAFT  01/2005    LEG SURGERY         Family History   Problem Relation Age of Onset    Diabetes Mother         MELLITUS    Hypertension Mother     Heart disease Mother     Prostate cancer Father     Dementia Brother     Heart disease Maternal Grandmother     Hypertension Maternal Grandmother     Heart disease Maternal Grandfather     Hypertension Family     Osteoporosis Family     Substance Abuse Neg Hx     Mental illness Neg Hx      I have reviewed and agree with the history as documented  Social History     Tobacco Use    Smoking status: Former Smoker    Smokeless tobacco: Never Used    Tobacco comment: QUIT 40 YEARS AGO ALSO [facility]   Substance Use Topics    Alcohol use: Yes     Frequency: Never     Comment: social    Drug use: No        Review of Systems   Constitutional: Negative for appetite change, chills, fatigue and fever  HENT: Negative for congestion, ear pain, rhinorrhea, sore throat, trouble swallowing and voice change  Eyes: Negative for pain and visual disturbance  Respiratory: Negative for cough, chest tightness and shortness of breath  Cardiovascular: Negative for chest pain, palpitations and leg swelling  Gastrointestinal: Negative for abdominal pain, blood in stool, constipation, diarrhea, nausea and vomiting  Genitourinary: Negative for difficulty urinating and hematuria  Musculoskeletal: Positive for joint swelling  Negative for back pain, neck pain and neck stiffness  Skin: Negative for rash  Neurological: Negative for dizziness, syncope, speech difficulty, light-headedness and headaches  Psychiatric/Behavioral: Negative for confusion and suicidal ideas  Physical Exam  Physical Exam   Constitutional: She is oriented to person, place, and time  She appears well-developed and well-nourished  No distress  HENT:   Head: Normocephalic and atraumatic     Right Ear: External ear normal    Left Ear: External ear normal    Nose: Nose normal    Mouth/Throat: Oropharynx is clear and moist    Eyes: Pupils are equal, round, and reactive to light  Conjunctivae and EOM are normal  Right eye exhibits no discharge  Left eye exhibits no discharge  No scleral icterus  Neck: Normal range of motion  Neck supple  No tracheal deviation present  Cardiovascular: Normal rate, regular rhythm, normal heart sounds and intact distal pulses  Exam reveals no gallop and no friction rub  No murmur heard  Pulmonary/Chest: Effort normal and breath sounds normal  No stridor  No respiratory distress  She exhibits no tenderness  Abdominal: Soft  Bowel sounds are normal  There is no tenderness  There is no rebound and no guarding  Musculoskeletal: She exhibits no edema or deformity  Left knee: She exhibits decreased range of motion, swelling and effusion  She exhibits no ecchymosis, no deformity, no laceration, no erythema, normal alignment, no LCL laxity, normal patellar mobility and no MCL laxity  Tenderness found  Lateral joint line tenderness noted  No erythema or warmth to suggest infection   Lymphadenopathy:     She has no cervical adenopathy  Neurological: She is alert and oriented to person, place, and time  No cranial nerve deficit or sensory deficit  Coordination normal    Skin: Skin is warm and dry  No rash noted  She is not diaphoretic  Psychiatric: She has a normal mood and affect  Her behavior is normal    Nursing note and vitals reviewed        Vital Signs  ED Triage Vitals [09/09/19 2144]   Temperature Pulse Respirations Blood Pressure SpO2   99 1 °F (37 3 °C) 94 20 142/78 100 %      Temp Source Heart Rate Source Patient Position - Orthostatic VS BP Location FiO2 (%)   Temporal Monitor Sitting Left arm --      Pain Score       9           Vitals:    09/09/19 2215 09/09/19 2230 09/09/19 2245 09/09/19 2315   BP: 148/77 144/82 155/83 (!) 178/97   Pulse: 79 87 82 65   Patient Position - Orthostatic VS:             Visual Acuity      ED Medications  Medications   lidocaine (LIDODERM) 5 % patch 1 patch (1 patch Topical Medication Applied 9/9/19 2212)   oxyCODONE-acetaminophen (PERCOCET) 5-325 mg per tablet 0 5 tablet (0 5 tablets Oral Given 9/9/19 2316)       Diagnostic Studies  Results Reviewed     None                 No orders to display              Procedures  Procedures       ED Course  ED Course as of Sep 10 0021   Mon Sep 09, 2019   2249 Pt with no relief from lidocaine patch  Will give percocet  Discussed my concerns with patient and her daughters about narcotics in someone her age, they will stop the tramadol as it is not helping, and will stay w/ her tonight  She has f/u already scheduled with ortho in am        Tue Sep 10, 2019   0010 Pt feeling somewhat improved, 6-8/10 after percocet  Pt's daughters are pushing for admission, and inpt MRI  I discussed that the MRI is very unlikely to be done inpatient  If pt is unable to ambulate, will discuss for observation                                     MDM  Number of Diagnoses or Management Options  Chronic pain of left knee: established and worsening     Amount and/or Complexity of Data Reviewed  Tests in the radiology section of CPT®: reviewed  Review and summarize past medical records: yes    Risk of Complications, Morbidity, and/or Mortality  Presenting problems: low  Diagnostic procedures: low  Management options: low    Patient Progress  Patient progress: improved      Disposition  Final diagnoses:   Chronic pain of left knee     Time reflects when diagnosis was documented in both MDM as applicable and the Disposition within this note     Time User Action Codes Description Comment    9/10/2019 12:17 AM Britany Vazquez Add [I07 [id number],  G89 29] Chronic pain of left knee       ED Disposition     ED Disposition Condition Date/Time Comment    Discharge Stable Tue Sep 10, 2019 12:17 AM Naren Soria discharge to home/self care             Follow-up Information     Follow up With Specialties Details Why Contact Info Additional [address] Jarocho Lopez, DO Internal Medicine Schedule an appointment as soon as possible for a visit   YENNY Adrian [address] Emergency Department Emergency Medicine  If symptoms worsen [address] [place] [address] [facility] [address], [place] [address], [place], [place], [address]    Your orthopedic doctor    Go to appointment tomorrow as scheduled  Patient's Medications   Discharge Prescriptions    OXYCODONE-ACETAMINOPHEN (PERCOCET) 5-325 MG PER TABLET    Take 0 5 tablets by mouth every 4 (four) hours as needed for moderate pain for up to 10 daysMax Daily Amount: 3 tablets       Start Date: 9/10/2019 End Date: 9/20/2019       Order Dose: 0 5 tablets       Quantity: 10 tablet    Refills: 0     No discharge procedures on file      ED Provider  Electronically Signed by           Sabine Al MD  09/10/19 0420

## 2019-09-10 NOTE — ED NOTES
Family came out and stated pt is having spasms and in much pain  Went in to room and repositioned pt with assist of Eulogio Baker, RN  Also noted that family had placed two pillows under left knee  I asked pt if she thinks the pillows are helping and does she want to keep them there  Pt stated they are not helping  Daughter stated, you did not see the pain she was in before I put them there  I explained I simply was asking the pt what her thoughts were and then asked the daughter what she would like me to do  Daughter then stated it was up to the pt  I again told the daughters the doctor will be in to speak with them and the pt  I asked the daughters if they were angry with me because I was unsure how I could help them  They stated they are not angry with me and should not take it personally, however, they are angry that she is not being admitted  I again told her it is not up to me and that the doctor will be in to speak with them  The one daughter then stated, "this is hard on her heart and her heart rate is too fast and she is probably in afib " HR currently 86 and I explained that I felt this heart rate was acceptable given the pt has pain  Also asked to give the percoecet some time to be effective       Fanny Goldstein RN  09/09/19 3364

## 2019-09-10 NOTE — PROGRESS NOTES
Assessment:     1  Acute pain of left knee        Plan:  The patient was seen and examined by Dr Nicholas Valentino and myself  Problem List Items Addressed This Visit        Other    Acute pain of left knee - Primary     Findings consistent with acute left knee pain, possible medial meniscus tear  Findings and treatment options were discussed with the patient and her daughter  Patient did not have any improvement with the cortisone injections given to the left knee joint and pes bursa last visit  She is scheduled to have an MRI of the left knee joint today  Continue ice, Tylenol and Aspercreme to left knee  She is unable to take NSAIDs  She is to only take the Percocet for severe pain  Follow-up with MRI results and Dr Nicholas Valentino will make further treatment recommendations at that time  All questions were answered to their satisfaction  Subjective:     Patient ID: Jennifer Thompson is a 80 y o  female  Chief Complaint:  80 y o  female following up for left knee pain  She was seen in the office 1 week ago and given cortisone injections to the left knee joint and pes bursa  She states she had no relief with the injections  Her pain became very severe yesterday she  She was seen emergency room and was prescribed Percocet that provides minimal relief  She states the pain is diffuse around her knee  It is aching and sharp  The sharp pain is localized mostly around the medial aspect of the knee  She has increased pain with weight-bearing  She has an MRI of the left knee scheduled today  She is unable to take NSAIDs since she is on a blood thinner      Allergy:  Allergies   Allergen Reactions    Heparin      Annotation - [id number]: LOW PLATELETS    Phenazopyridine      As per daughter "Not appropriate for pt due to heart condition"     Medications:  all current active meds have been reviewed  Past Medical History:  Past Medical History:   Diagnosis Date    Acute myocardial infarction [facility]     Atrial fibrillation ([address] )     CAD (coronary artery disease)     Cellulitis      AND ABSCESS    Cholecystitis     Closed Colles' fracture     OF THE LEFT WRIST; LAST ASSESSED: 5/2/14    Closed fracture of radius     LAST ASSESSED: 2/19/14    Dyspnea     Hair loss disorder     Hematuria     History of being hospitalized     1/5/12-1/14/12 [facility] ROSARIO  [facility] PROCEDURES: 1) REDO STERNOTOMY AND AORTIC VALVE REPLACEMENT WITH A 19-MM REBA-PETERS BOVINIE PERICARDIAL MAGNA EASE VALVE 2) TRANSESOPHAGEAL ECHOCARDIOGRAM     Hypercholesterolemia     Hypertension     Incomplete bladder emptying     Leg wound, left     Malaise and fatigue     Sick sinus syndrome (HCC)     Urinary tract infection      Past Surgical History:  Past Surgical History:   Procedure Laterality Date    AORTIC VALVE REPLACEMENT  01/2012    HEART VALVE    CARDIAC SURGERY      CATARACT EXTRACTION W/  INTRAOCULAR LENS IMPLANT  08/26/2010    Sanna Resendiz MD; PHACOEMULISIFICATION; INSERTION INTRAOCULAR LENS OD    CORONARY ARTERY BYPASS GRAFT  01/2005    LEG SURGERY       Family History:  Family History   Problem Relation Age of Onset    Diabetes Mother         MELLITUS    Hypertension Mother     Heart disease Mother     Prostate cancer Father     Dementia Brother     Heart disease Maternal Grandmother     Hypertension Maternal Grandmother     Heart disease Maternal Grandfather     Hypertension Family     Osteoporosis Family     Substance Abuse Neg Hx     Mental illness Neg Hx      Social History:  Social History     Substance and Sexual Activity   Alcohol Use Yes    Frequency: Never    Comment: social     Social History     Substance and Sexual Activity   Drug Use No     Social History     Tobacco Use   Smoking Status Former Smoker   Smokeless Tobacco Never Used   Tobacco Comment    QUIT 40 YEARS AGO ALSO NEVER A SMOKER AS PER [facility]     Review of Systems   Constitutional: Negative for chills, fever and unexpected weight change  HENT: Negative for hearing loss, nosebleeds and sore throat  Eyes: Negative for pain, redness and visual disturbance  Respiratory: Negative for cough, shortness of breath and wheezing  Cardiovascular: Negative for chest pain, palpitations and leg swelling  Gastrointestinal: Negative for abdominal pain, nausea and vomiting  Endocrine: Negative for polydipsia and polyuria  Genitourinary: Negative for difficulty urinating and hematuria  Musculoskeletal: Positive for arthralgias (Left knee)  Negative for joint swelling and myalgias  Skin: Negative for rash and wound  Neurological: Negative for dizziness, numbness and headaches  Hematological: Bruises/bleeds easily  Psychiatric/Behavioral: Negative for decreased concentration, dysphoric mood and suicidal ideas  The patient is not nervous/anxious  Objective:  BP Readings from Last 1 Encounters:   09/10/19 152/82      Wt Readings from Last 1 Encounters:   09/10/19 53 1 kg (117 lb)      BMI:   Estimated body mass index is 21 4 kg/m² as calculated from the following:    Height as of this encounter: 5' 2" (1 575 m)  Weight as of this encounter: 53 1 kg (117 lb)  BSA:   Estimated body surface area is 1 52 meters squared as calculated from the following:    Height as of this encounter: 5' 2" (1 575 m)  Weight as of this encounter: 53 1 kg (117 lb)  Physical Exam   Constitutional: She is oriented to person, place, and time  She appears well-developed and well-nourished  HENT:   Head: Normocephalic and atraumatic  Eyes: Conjunctivae and EOM are normal    Neck: Neck supple  Pulmonary/Chest: Effort normal    Musculoskeletal:        Left knee: She exhibits no effusion  Neurological: She is alert and oriented to person, place, and time  Skin: Skin is warm and dry  Psychiatric: She has a normal mood and affect  Nursing note and vitals reviewed        Left Knee Exam     Muscle Strength   The patient has normal left knee strength  Tenderness   The patient is experiencing tenderness in the medial joint line  Range of Motion   Extension: -30   Flexion: 100     Tests   Pasquale:  Medial - positive Lateral - negative  Varus: negative Valgus: negative  Patellar apprehension: negative    Other   Erythema: absent  Scars: present  Sensation: normal  Pulse: present  Swelling: none  Effusion: no effusion present              Procedures  I have personally reviewed pertinent films in PACS and my interpretation is no fracture or dislocation, no osteoarthritis

## 2019-09-10 NOTE — ED NOTES
Pt uses walker at home  Pt ambulated here with walker to bathroom without any difficulty; daughters present and witnessed         Ela Gaytan RN  09/10/19 2830

## 2019-09-10 NOTE — ASSESSMENT & PLAN NOTE
Findings consistent with acute left knee pain, possible medial meniscus tear  Findings and treatment options were discussed with the patient and her daughter  Patient did not have any improvement with the cortisone injections given to the left knee joint and pes bursa last visit  She is scheduled to have an MRI of the left knee joint today  Continue ice, Tylenol and Aspercreme to left knee  She is unable to take NSAIDs  She is to only take the Percocet for severe pain  Follow-up with MRI results and Dr Neeraj Kemp will make further treatment recommendations at that time  All questions were answered to their satisfaction

## 2019-09-10 NOTE — ED NOTES
Daughters apologetic that they were upset; witnessed pt ambulating; pt and family agreeable to take pt home  Pt placed in wheelchair and assisted out to car       Zackary Rodriguez RN  09/10/19 7434

## 2019-09-10 NOTE — ED NOTES
Went in to medicate pt and introduce myself to pt and family; explained medication I am giving her; daughters expressed anger and stated they will not be taking her home; stated we cannot send a 80year old home with knee pain; one daughter stated her HR is 80 and her normal resting heart rate is in the 50's; I explained that I would have the doctor come in and speak with them  They again expressed desire for her to be admitted and I again explained that I did not make the decision but would have the doctor come in and speak with them  Dr Aminah Le made aware of situation       Polina Rodriguez RN  09/09/19 [id number]

## 2019-09-10 NOTE — ED NOTES
Daughter came out and stated pt's muscles are cramping  Went in to see pt (with supervisor, Meghan Peng)  Pt currently resting in bed, pt agrees that currently the muscles are soft and not cramping and stated that maybe the percocet is helping  Daughter expressed concern that she needs a muscle relaxant  Explained the doctor will be in to discuss that with them  Other daughter expressed concern that pt might need IV fluids  Again, explained that the doctor will be in to discuss that  Pt again agreed that muscles are currently soft and maybe the percocet is helping       Graham Hawk RN  09/10/19 0002

## 2019-09-11 DIAGNOSIS — Z71.89 COMPLEX CARE COORDINATION: Primary | ICD-10-CM

## 2019-09-12 ENCOUNTER — PATIENT OUTREACH (OUTPATIENT)
Dept: FAMILY MEDICINE CLINIC | Facility: HOSPITAL | Age: 84
End: 2019-09-12

## 2019-09-12 ENCOUNTER — TELEPHONE (OUTPATIENT)
Dept: FAMILY MEDICINE CLINIC | Facility: HOSPITAL | Age: 84
End: 2019-09-12

## 2019-09-12 ENCOUNTER — OFFICE VISIT (OUTPATIENT)
Dept: OBGYN CLINIC | Facility: CLINIC | Age: 84
End: 2019-09-12
Payer: MEDICARE

## 2019-09-12 VITALS — SYSTOLIC BLOOD PRESSURE: 128 MMHG | DIASTOLIC BLOOD PRESSURE: 80 MMHG | HEART RATE: 82 BPM

## 2019-09-12 DIAGNOSIS — S83.242A ACUTE MEDIAL MENISCUS TEAR OF LEFT KNEE, INITIAL ENCOUNTER: Primary | ICD-10-CM

## 2019-09-12 PROCEDURE — 1123F ACP DISCUSS/DSCN MKR DOCD: CPT | Performed by: PHYSICIAN ASSISTANT

## 2019-09-12 PROCEDURE — 99214 OFFICE O/P EST MOD 30 MIN: CPT | Performed by: ORTHOPAEDIC SURGERY

## 2019-09-12 RX ORDER — CEFAZOLIN SODIUM 1 G/50ML
1000 SOLUTION INTRAVENOUS ONCE
Status: CANCELLED | OUTPATIENT
Start: 2019-09-23 | End: 2019-09-12

## 2019-09-12 RX ORDER — SODIUM CHLORIDE, SODIUM LACTATE, POTASSIUM CHLORIDE, CALCIUM CHLORIDE 600; 310; 30; 20 MG/100ML; MG/100ML; MG/100ML; MG/100ML
60 INJECTION, SOLUTION INTRAVENOUS CONTINUOUS
Status: CANCELLED | OUTPATIENT
Start: 2019-09-23

## 2019-09-12 RX ORDER — CHLORHEXIDINE GLUCONATE 4 G/100ML
SOLUTION TOPICAL DAILY PRN
Status: CANCELLED | OUTPATIENT
Start: 2019-09-23

## 2019-09-12 NOTE — ASSESSMENT & PLAN NOTE
Findings consistent with left knee medial meniscus tear  Discussed findings and treatment options with the patient  I reviewed patient's left knee MRI with her and her daughter  I discussed prognosis of her knee condition  I recommend patient surgical intervention with left knee arthroscopy partial medial meniscectomy, which patient agreed to proceed  We will schedule patient as outpatient procedure  All patient's questions were answered to their satisfaction  This note is created using dictation transcription  It may contain typographical errors, grammatical errors, improperly dictated words, background noise and other errors      Discussed with patient surgical risks and complications including but not limited to infection, persistent pain, nerve and vessel injury, complications associated with anesthesia, DVT, etc

## 2019-09-12 NOTE — H&P (VIEW-ONLY)
Assessment:     1  Acute medial meniscus tear of left knee, initial encounter        Plan:     Problem List Items Addressed This Visit        Musculoskeletal and Integument    Acute medial meniscus tear of left knee - Primary     Findings consistent with left knee medial meniscus tear  Discussed findings and treatment options with the patient  I reviewed patient's left knee MRI with her and her daughter  I discussed prognosis of her knee condition  I recommend patient surgical intervention with left knee arthroscopy partial medial meniscectomy, which patient agreed to proceed  We will schedule patient as outpatient procedure  All patient's questions were answered to their satisfaction  This note is created using dictation transcription  It may contain typographical errors, grammatical errors, improperly dictated words, background noise and other errors  Discussed with patient surgical risks and complications including but not limited to infection, persistent pain, nerve and vessel injury, complications associated with anesthesia, DVT, etc          Relevant Orders    Walker    Case request operating room: ARTHROSCOPY KNEE, LEFT PARTIAL MEDIAL MENISCECTOMY (Completed)    Ambulatory referral to Family Practice    CBC and differential    Basic metabolic panel    APTT    Protime-INR    EKG 12 lead         Subjective:     Patient ID: Driss Burgess is a 80 y o  female  Chief Complaint:  60-year-old female follow-up left knee pain  Patient continued to have sharp pain along the inner aspect of her knee when she tried ambulate  She also has sensation of giving out when the pain occurs  She is having difficulty walking due to pain  She returns with her daughter today to review her left knee MRI      Allergy:  Allergies   Allergen Reactions    Heparin      Annotation - [id number]: LOW PLATELETS    Phenazopyridine      As per daughter "Not appropriate for pt due to heart condition"     Medications:  all current active meds have been reviewed  Past Medical History:  Past Medical History:   Diagnosis Date    Acute [facility] infarction [address] [address]     Atrial fibrillation [address] [address]     CAD (coronary artery disease)     Cellulitis      AND ABSCESS    Cholecystitis     Closed Colles' fracture     OF THE LEFT WRIST; LAST ASSESSED: 5/2/14    Closed fracture of radius     LAST ASSESSED: 2/19/14    Dyspnea     Hair loss disorder     Hematuria     History of being hospitalized     1/5/12-1/14/12 [facility] [place] [facility] PROCEDURES: 1) REDO STERNOTOMY AND AORTIC VALVE REPLACEMENT WITH A 19-MM REBA-PETERS BOVINIE PERICARDIAL MAGNA EASE VALVE 2) TRANSESOPHAGEAL ECHOCARDIOGRAM     Hypercholesterolemia     Hypertension     Incomplete bladder emptying     Leg wound, left     Malaise and fatigue     Sick sinus syndrome (HCC)     Urinary tract infection      Past Surgical History:  Past Surgical History:   Procedure Laterality Date    AORTIC VALVE REPLACEMENT  01/2012    HEART VALVE    CARDIAC SURGERY      CATARACT EXTRACTION W/  INTRAOCULAR LENS IMPLANT  08/26/2010    Trent Ortiz MD; PHACOEMULISIFICATION; INSERTION INTRAOCULAR LENS OD    CORONARY ARTERY BYPASS GRAFT  01/2005    LEG SURGERY       Family History:  Family History   Problem Relation Age of Onset    Diabetes Mother         MELLITUS    Hypertension Mother     Heart disease Mother     Prostate cancer Father     Dementia Brother     Heart disease Maternal Grandmother     Hypertension Maternal Grandmother     Heart disease Maternal Grandfather     Hypertension Family     Osteoporosis Family     Substance Abuse Neg Hx     Mental illness Neg Hx      Social History:  Social History     Substance and Sexual Activity   Alcohol Use Yes    Frequency: Never    Comment: social     Social History     Substance and Sexual Activity   Drug Use No     Social History     Tobacco Use   Smoking Status Former Smoker   Smokeless Tobacco Never Used   Tobacco Comment    QUIT 36 YEARS AGO ALSO NEVER A SMOKER AS PER [facility]     Review of Systems   Constitutional: Negative  HENT: Negative  Eyes: Negative  Respiratory: Negative  Cardiovascular: Negative  Gastrointestinal: Negative  Endocrine: Negative  Genitourinary: Negative  Musculoskeletal: Positive for arthralgias (Left knee), gait problem (Arriving wheelchair) and joint swelling (Left knee)  Skin: Negative  Allergic/Immunologic: Negative  Hematological: Negative  Psychiatric/Behavioral: Negative  Objective:  BP Readings from Last 1 Encounters:   09/12/19 128/80      Wt Readings from Last 1 Encounters:   09/10/19 53 1 kg (117 lb)      BMI:   Estimated body mass index is 21 4 kg/m² as calculated from the following:    Height as of 9/10/19: 5' 2" (1 575 m)  Weight as of 9/10/19: 53 1 kg (117 lb)  BSA:   Estimated body surface area is 1 52 meters squared as calculated from the following:    Height as of 9/10/19: 5' 2" (1 575 m)  Weight as of 9/10/19: 53 1 kg (117 lb)  Physical Exam   Constitutional: She is oriented to person, place, and time  She appears well-developed  HENT:   Head: Normocephalic and atraumatic  Eyes: Pupils are equal, round, and reactive to light  Conjunctivae and EOM are normal    Neck: Normal range of motion  Neck supple  Cardiovascular: Normal rate, normal heart sounds and intact distal pulses  Exam reveals no gallop  No murmur heard  Pulmonary/Chest: Breath sounds normal  She has no wheezes  She has no rales  Abdominal: Soft  Musculoskeletal:        Left knee: She exhibits effusion (Grade 1)  Neurological: She is alert and oriented to person, place, and time  Skin: Skin is warm  Psychiatric: She has a normal mood and affect  Nursing note and vitals reviewed  Left Knee Exam     Muscle Strength   The patient has normal left knee strength  Tenderness   The patient is experiencing tenderness in the medial joint line      Range of Motion Extension:  -15 (Pain) abnormal   Flexion: normal Left knee flexion: Pain  Tests   Pasquale:  Medial - positive Lateral - negative  Varus: negative Valgus: negative  Lachman:  Anterior - negative    Posterior - negative  Drawer:  Anterior - negative     Posterior - negative  Pivot shift: negative  Patellar apprehension: negative    Other   Erythema: absent  Sensation: normal  Pulse: present  Swelling: none  Effusion: effusion (Grade 1) present            I have personally reviewed pertinent films in PACS and my interpretation is Left knee MRI show medial meniscus tear in the posterior horn

## 2019-09-12 NOTE — PROGRESS NOTES
Assessment:     1  Acute medial meniscus tear of left knee, initial encounter        Plan:     Problem List Items Addressed This Visit        Musculoskeletal and Integument    Acute medial meniscus tear of left knee - Primary     Findings consistent with left knee medial meniscus tear  Discussed findings and treatment options with the patient  I reviewed patient's left knee MRI with her and her daughter  I discussed prognosis of her knee condition  I recommend patient surgical intervention with left knee arthroscopy partial medial meniscectomy, which patient agreed to proceed  We will schedule patient as outpatient procedure  All patient's questions were answered to their satisfaction  This note is created using dictation transcription  It may contain typographical errors, grammatical errors, improperly dictated words, background noise and other errors  Discussed with patient surgical risks and complications including but not limited to infection, persistent pain, nerve and vessel injury, complications associated with anesthesia, DVT, etc          Relevant Orders    Walker    Case request operating room: ARTHROSCOPY KNEE, LEFT PARTIAL MEDIAL MENISCECTOMY (Completed)    Ambulatory referral to Family Practice    CBC and differential    Basic metabolic panel    APTT    Protime-INR    EKG 12 lead         Subjective:     Patient ID: Manjinder Graves is a 80 y o  female  Chief Complaint:  27-year-old female follow-up left knee pain  Patient continued to have sharp pain along the inner aspect of her knee when she tried ambulate  She also has sensation of giving out when the pain occurs  She is having difficulty walking due to pain  She returns with her daughter today to review her left knee MRI      Allergy:  Allergies   Allergen Reactions    Heparin      Annotation - [id number]: LOW PLATELETS    Phenazopyridine      As per daughter "Not appropriate for pt due to heart condition"     Medications:  all current active meds have been reviewed  Past Medical History:  Past Medical History:   Diagnosis Date    Acute myocardial infarction ([facility] [address] )     Atrial fibrillation ([facility] [address] [facility]     CAD (coronary artery disease)     Cellulitis      AND ABSCESS    Cholecystitis     Closed Colles' fracture     OF THE LEFT WRIST; LAST ASSESSED: 5/2/14    Closed fracture of radius     LAST ASSESSED: 2/19/14    Dyspnea     Hair loss disorder     Hematuria     History of being hospitalized     1/5/12-1/14/12 [facility] [place] [facility] PROCEDURES: 1) REDO STERNOTOMY AND AORTIC VALVE REPLACEMENT WITH A 19-MM REBA-PETERS BOVINIE PERICARDIAL MAGNA EASE VALVE 2) TRANSESOPHAGEAL ECHOCARDIOGRAM     Hypercholesterolemia     Hypertension     Incomplete bladder emptying     Leg wound, left     Malaise and fatigue     Sick sinus syndrome (HCC)     Urinary tract infection      Past Surgical History:  Past Surgical History:   Procedure Laterality Date    AORTIC VALVE REPLACEMENT  01/2012    HEART VALVE    CARDIAC SURGERY      CATARACT EXTRACTION W/  INTRAOCULAR LENS IMPLANT  08/26/2010    Luann Cormier MD; PHACOEMULISIFICATION; INSERTION INTRAOCULAR LENS OD    CORONARY ARTERY BYPASS GRAFT  01/2005    LEG SURGERY       Family History:  Family History   Problem Relation Age of Onset    Diabetes Mother         MELLITUS    Hypertension Mother     Heart disease Mother     Prostate cancer Father     Dementia Brother     Heart disease Maternal Grandmother     Hypertension Maternal Grandmother     Heart disease Maternal Grandfather     Hypertension Family     Osteoporosis Family     Substance Abuse Neg Hx     Mental illness Neg Hx      Social History:  Social History     Substance and Sexual Activity   Alcohol Use Yes    Frequency: Never    Comment: social     Social History     Substance and Sexual Activity   Drug Use No     Social History     Tobacco Use   Smoking Status Former Smoker   Smokeless Tobacco Never Used   Tobacco Comment    QUIT 36 YEARS AGO ALSO NEVER A SMOKER AS PER [facility] [place] [facility]\""     Review of Systems   Constitutional: Negative  HENT: Negative  Eyes: Negative  Respiratory: Negative  Cardiovascular: Negative  Gastrointestinal: Negative  Endocrine: Negative  Genitourinary: Negative  Musculoskeletal: Positive for arthralgias (Left knee), gait problem (Arriving wheelchair) and joint swelling (Left knee)  Skin: Negative  Allergic/Immunologic: Negative  Hematological: Negative  Psychiatric/Behavioral: Negative  Objective:  BP Readings from Last 1 Encounters:   09/12/19 128/80      Wt Readings from Last 1 Encounters:   09/10/19 53 1 kg (117 lb)      BMI:   Estimated body mass index is 21 4 kg/m² as calculated from the following:    Height as of 9/10/19: 5' 2" (1 575 m)  Weight as of 9/10/19: 53 1 kg (117 lb)  BSA:   Estimated body surface area is 1 52 meters squared as calculated from the following:    Height as of 9/10/19: 5' 2" (1 575 m)  Weight as of 9/10/19: 53 1 kg (117 lb)  Physical Exam   Constitutional: She is oriented to person, place, and time  She appears well-developed  HENT:   Head: Normocephalic and atraumatic  Eyes: Pupils are equal, round, and reactive to light  Conjunctivae and EOM are normal    Neck: Normal range of motion  Neck supple  Cardiovascular: Normal rate, normal heart sounds and intact distal pulses  Exam reveals no gallop  No murmur heard  Pulmonary/Chest: Breath sounds normal  She has no wheezes  She has no rales  Abdominal: Soft  Musculoskeletal:        Left knee: She exhibits effusion (Grade 1)  Neurological: She is alert and oriented to person, place, and time  Skin: Skin is warm  Psychiatric: She has a normal mood and affect  Nursing note and vitals reviewed  Left Knee Exam     Muscle Strength   The patient has normal left knee strength  Tenderness   The patient is experiencing tenderness in the medial joint line      Range of Motion Extension:  -15 (Pain) abnormal   Flexion: normal Left knee flexion: Pain  Tests   Pasquale:  Medial - positive Lateral - negative  Varus: negative Valgus: negative  Lachman:  Anterior - negative    Posterior - negative  Drawer:  Anterior - negative     Posterior - negative  Pivot shift: negative  Patellar apprehension: negative    Other   Erythema: absent  Sensation: normal  Pulse: present  Swelling: none  Effusion: effusion (Grade 1) present            I have personally reviewed pertinent films in PACS and my interpretation is Left knee MRI show medial meniscus tear in the posterior horn

## 2019-09-12 NOTE — TELEPHONE ENCOUNTER
Terra from Ortho called to schedule a preop clearance for pt  She has a torn meniscus and is having surgery on 9/20  I scheduled her for 9/18 for preop clearance with you  The patient was seen by you just a few weeks ago  Do she need to come in for appt or can you clear her pending PATS?

## 2019-09-13 ENCOUNTER — TELEPHONE (OUTPATIENT)
Dept: OBGYN CLINIC | Facility: CLINIC | Age: 84
End: 2019-09-13

## 2019-09-13 ENCOUNTER — TELEPHONE (OUTPATIENT)
Dept: OBGYN CLINIC | Facility: HOSPITAL | Age: 84
End: 2019-09-13

## 2019-09-13 NOTE — TELEPHONE ENCOUNTER
Patient's daughter, Lisset Vasquez is calling to speak with the surgery scheduler  Please call Lisset Vasquez back as soon as possible   [phone]

## 2019-09-16 ENCOUNTER — APPOINTMENT (OUTPATIENT)
Dept: LAB | Facility: HOSPITAL | Age: 84
End: 2019-09-16
Attending: ORTHOPAEDIC SURGERY
Payer: MEDICARE

## 2019-09-16 DIAGNOSIS — E87.6 HYPOKALEMIA: ICD-10-CM

## 2019-09-16 DIAGNOSIS — S83.242A ACUTE MEDIAL MENISCUS TEAR OF LEFT KNEE, INITIAL ENCOUNTER: ICD-10-CM

## 2019-09-16 PROBLEM — N18.30 CKD (CHRONIC KIDNEY DISEASE) STAGE 3, GFR 30-59 ML/MIN (HCC): Status: ACTIVE | Noted: 2019-09-16

## 2019-09-16 LAB
ANION GAP SERPL CALCULATED.3IONS-SCNC: 8 MMOL/L (ref 4–13)
APTT PPP: 41 SECONDS (ref 23–37)
BASOPHILS # BLD AUTO: 0.02 THOUSANDS/ΜL (ref 0–0.1)
BASOPHILS NFR BLD AUTO: 0 % (ref 0–1)
BUN SERPL-MCNC: 17 MG/DL (ref 5–25)
CALCIUM SERPL-MCNC: 8.8 MG/DL (ref 8.3–10.1)
CHLORIDE SERPL-SCNC: 107 MMOL/L (ref 100–108)
CO2 SERPL-SCNC: 28 MMOL/L (ref 21–32)
CREAT SERPL-MCNC: 1.07 MG/DL (ref 0.6–1.3)
EOSINOPHIL # BLD AUTO: 0.07 THOUSAND/ΜL (ref 0–0.61)
EOSINOPHIL NFR BLD AUTO: 1 % (ref 0–6)
ERYTHROCYTE [DISTWIDTH] IN BLOOD BY AUTOMATED COUNT: 16.2 % (ref 11.6–15.1)
GFR SERPL CREATININE-BSD FRML MDRD: 46 ML/MIN/1.73SQ M
GLUCOSE P FAST SERPL-MCNC: 85 MG/DL (ref 65–99)
HCT VFR BLD AUTO: 36 % (ref 34.8–46.1)
HGB BLD-MCNC: 11.4 G/DL (ref 11.5–15.4)
IMM GRANULOCYTES # BLD AUTO: 0.04 THOUSAND/UL (ref 0–0.2)
IMM GRANULOCYTES NFR BLD AUTO: 1 % (ref 0–2)
INR PPP: 1.31 (ref 0.84–1.19)
LYMPHOCYTES # BLD AUTO: 1.96 THOUSANDS/ΜL (ref 0.6–4.47)
LYMPHOCYTES NFR BLD AUTO: 23 % (ref 14–44)
MCH RBC QN AUTO: 27.8 PG (ref 26.8–34.3)
MCHC RBC AUTO-ENTMCNC: 31.7 G/DL (ref 31.4–37.4)
MCV RBC AUTO: 88 FL (ref 82–98)
MONOCYTES # BLD AUTO: 0.92 THOUSAND/ΜL (ref 0.17–1.22)
MONOCYTES NFR BLD AUTO: 11 % (ref 4–12)
NEUTROPHILS # BLD AUTO: 5.42 THOUSANDS/ΜL (ref 1.85–7.62)
NEUTS SEG NFR BLD AUTO: 64 % (ref 43–75)
NRBC BLD AUTO-RTO: 0 /100 WBCS
PLATELET # BLD AUTO: 162 THOUSANDS/UL (ref 149–390)
PMV BLD AUTO: 10.9 FL (ref 8.9–12.7)
POTASSIUM SERPL-SCNC: 3.7 MMOL/L (ref 3.5–5.3)
PROTHROMBIN TIME: 16 SECONDS (ref 11.6–14.5)
RBC # BLD AUTO: 4.1 MILLION/UL (ref 3.81–5.12)
SODIUM SERPL-SCNC: 143 MMOL/L (ref 136–145)
WBC # BLD AUTO: 8.43 THOUSAND/UL (ref 4.31–10.16)

## 2019-09-16 PROCEDURE — 36415 COLL VENOUS BLD VENIPUNCTURE: CPT

## 2019-09-16 PROCEDURE — 85025 COMPLETE CBC W/AUTO DIFF WBC: CPT

## 2019-09-16 PROCEDURE — 80048 BASIC METABOLIC PNL TOTAL CA: CPT

## 2019-09-16 PROCEDURE — 85730 THROMBOPLASTIN TIME PARTIAL: CPT

## 2019-09-16 PROCEDURE — 85610 PROTHROMBIN TIME: CPT

## 2019-09-17 ENCOUNTER — ANESTHESIA EVENT (OUTPATIENT)
Dept: PERIOP | Facility: HOSPITAL | Age: 84
End: 2019-09-17
Payer: MEDICARE

## 2019-09-17 NOTE — TELEPHONE ENCOUNTER
Called pt and told her there was no need to come in  The patient said she is feeling fine    Canceled the appointment

## 2019-09-19 NOTE — PRE-PROCEDURE INSTRUCTIONS
Pre-Surgery Instructions:   Medication Instructions    amiodarone 200 mg tablet Instructed patient per Anesthesia Guidelines   amLODIPine (NORVASC) 2 5 mg tablet Instructed patient per Anesthesia Guidelines   atorvastatin (LIPITOR) 20 mg tablet Instructed patient per Anesthesia Guidelines   clotrimazole-betamethasone (LOTRISONE) 1-0 05 % cream Instructed patient per Anesthesia Guidelines   Coenzyme Q10 (COQ10) 100 MG CAPS Instructed patient per Anesthesia Guidelines   conjugated estrogens (PREMARIN) vaginal cream Instructed patient per Anesthesia Guidelines   gabapentin (NEURONTIN) 100 mg capsule Instructed patient per Anesthesia Guidelines   potassium chloride (K-DUR,KLOR-CON) 10 mEq tablet Instructed patient per Anesthesia Guidelines   torsemide (DEMADEX) 20 mg tablet Instructed patient per Anesthesia Guidelines  Pre-op Showering Instructions for Surgery Patients    Before your operation, you play an important role in decreasing your risk for infection by washing with special antiseptic soap  This is an effective way to reduce bacteria on the skin which may help to prevent infections at the surgical site  Please read the following directions in advance  1  In the week before your operation, purchase a 4 ounce bottle of antiseptic soap containing chlorhexidine gluconate (CHG)  4%  Some brand names include: Aplicare®, Endure, and Hibiclens®  The cost is usually less than $5 00   For your convenience, the [employer] carries the soap   It may also be available at your doctors office or pre-admission testing center, and at most retail pharmacies   If you are allergic or sensitive to soaps containing CHG, please let your doctor know so another antiseptic can be suggested   CHG antiseptic soap is for external use only  2   The day before your operation, follow these instructions carefully to get ready   Please clean linens (sheets) on your bed; you should sleep on clean sheets after your evening shower   Get clean towels and washcloth ready - you need enough for 2 showers   Set aside clean underwear, pajamas, and clothing to wear after the showers     Reminders:   DO NOT use any other soap or body rinse on your skin during or after the antiseptic showers   DO NOT use lotion, powder, deodorant, or perfume/aftershave of any kind on your skin after your antiseptic shower   DO NOT shave any body parts in the 24 hours/day before your operation   DO NOT get the antiseptic soap in your eyes, ears, nose, mouth, or vaginal area    3  You will need to shower the night before AND the morning of your surgery  Shower 1:   The first evening before the operation, take the first shower   First, shampoo your hair with regular shampoo and rinse it completely before you use the antiseptic soap  After washing and rinsing your hair, rinse your body   Next, use a clean washcloth to apply the antiseptic soap and wash your body from the neck down to your toes using ½ bottle of the antiseptic soap  You will use the other ½ bottle for the second shower   Clean the area where your incision will be; lather this area well for about 2 minutes   If you are having head or neck surgery, wash areas with the antiseptic soap   Rinse yourself completely with running water   Use a clean towel to dry off   Wear clean underwear and clothing/pajamas  Shower 2   The morning of your operation, take the second shower following the same steps as Shower 1 using the second ½ of the bottle of antiseptic soap   Use clean cloths and towels to wash and dry yourself   Wear clean underwear and clothing     Pre-procedure instructions given to daughter, Ty Salinas  No further questions or concerns at this time  Ty Salinas reports her mom will use a walker if needed for weight bearing status post surgery  Ty Salinas is available to assist post care

## 2019-09-23 ENCOUNTER — ANESTHESIA (OUTPATIENT)
Dept: PERIOP | Facility: HOSPITAL | Age: 84
End: 2019-09-23
Payer: MEDICARE

## 2019-09-23 ENCOUNTER — HOSPITAL ENCOUNTER (OUTPATIENT)
Facility: HOSPITAL | Age: 84
Setting detail: OUTPATIENT SURGERY
Discharge: HOME | End: 2019-09-23
Attending: ORTHOPAEDIC SURGERY | Admitting: ORTHOPAEDIC SURGERY
Payer: MEDICARE

## 2019-09-23 VITALS
BODY MASS INDEX: 22.23 KG/M2 | OXYGEN SATURATION: 96 % | DIASTOLIC BLOOD PRESSURE: 71 MMHG | WEIGHT: 120.8 LBS | RESPIRATION RATE: 18 BRPM | HEART RATE: 75 BPM | SYSTOLIC BLOOD PRESSURE: 152 MMHG | TEMPERATURE: 97.5 F | HEIGHT: 62 IN

## 2019-09-23 DIAGNOSIS — S83.242D ACUTE MEDIAL MENISCUS TEAR OF LEFT KNEE, SUBSEQUENT ENCOUNTER: Primary | ICD-10-CM

## 2019-09-23 PROCEDURE — 29881 ARTHRS KNE SRG MNISECTMY M/L: CPT | Performed by: ORTHOPAEDIC SURGERY

## 2019-09-23 PROCEDURE — NC001 PR NO CHARGE: Performed by: PHYSICIAN ASSISTANT

## 2019-09-23 PROCEDURE — 29881 ARTHRS KNE SRG MNISECTMY M/L: CPT | Performed by: PHYSICIAN ASSISTANT

## 2019-09-23 RX ORDER — ACETAMINOPHEN 325 MG/1
650 TABLET ORAL EVERY 6 HOURS PRN
Status: DISCONTINUED | OUTPATIENT
Start: 2019-09-23 | End: 2019-09-23 | Stop reason: HOSPADM

## 2019-09-23 RX ORDER — FENTANYL CITRATE 50 UG/ML
INJECTION, SOLUTION INTRAMUSCULAR; INTRAVENOUS AS NEEDED
Status: DISCONTINUED | OUTPATIENT
Start: 2019-09-23 | End: 2019-09-23 | Stop reason: SURG

## 2019-09-23 RX ORDER — CEFAZOLIN SODIUM 1 G/50ML
1000 SOLUTION INTRAVENOUS ONCE
Status: DISCONTINUED | OUTPATIENT
Start: 2019-09-23 | End: 2019-09-23 | Stop reason: HOSPADM

## 2019-09-23 RX ORDER — SODIUM CHLORIDE, SODIUM LACTATE, POTASSIUM CHLORIDE, CALCIUM CHLORIDE 600; 310; 30; 20 MG/100ML; MG/100ML; MG/100ML; MG/100ML
60 INJECTION, SOLUTION INTRAVENOUS CONTINUOUS
Status: DISCONTINUED | OUTPATIENT
Start: 2019-09-23 | End: 2019-09-23 | Stop reason: HOSPADM

## 2019-09-23 RX ORDER — FENTANYL CITRATE/PF 50 MCG/ML
25 SYRINGE (ML) INJECTION
Status: DISCONTINUED | OUTPATIENT
Start: 2019-09-23 | End: 2019-09-23 | Stop reason: HOSPADM

## 2019-09-23 RX ORDER — CHLORHEXIDINE GLUCONATE 4 G/100ML
SOLUTION TOPICAL DAILY PRN
Status: DISCONTINUED | OUTPATIENT
Start: 2019-09-23 | End: 2019-09-23 | Stop reason: HOSPADM

## 2019-09-23 RX ORDER — PROPOFOL 10 MG/ML
INJECTION, EMULSION INTRAVENOUS AS NEEDED
Status: DISCONTINUED | OUTPATIENT
Start: 2019-09-23 | End: 2019-09-23 | Stop reason: SURG

## 2019-09-23 RX ORDER — ONDANSETRON 2 MG/ML
4 INJECTION INTRAMUSCULAR; INTRAVENOUS EVERY 6 HOURS PRN
Status: DISCONTINUED | OUTPATIENT
Start: 2019-09-23 | End: 2019-09-23 | Stop reason: HOSPADM

## 2019-09-23 RX ORDER — BUPIVACAINE HYDROCHLORIDE AND EPINEPHRINE 5; 5 MG/ML; UG/ML
INJECTION, SOLUTION EPIDURAL; INTRACAUDAL; PERINEURAL AS NEEDED
Status: DISCONTINUED | OUTPATIENT
Start: 2019-09-23 | End: 2019-09-23 | Stop reason: HOSPADM

## 2019-09-23 RX ORDER — ONDANSETRON 2 MG/ML
INJECTION INTRAMUSCULAR; INTRAVENOUS AS NEEDED
Status: DISCONTINUED | OUTPATIENT
Start: 2019-09-23 | End: 2019-09-23 | Stop reason: SURG

## 2019-09-23 RX ORDER — ACETAMINOPHEN AND CODEINE PHOSPHATE 300; 30 MG/1; MG/1
1 TABLET ORAL EVERY 4 HOURS PRN
Qty: 20 TABLET | Refills: 0 | Status: SHIPPED | OUTPATIENT
Start: 2019-09-23 | End: 2019-10-03

## 2019-09-23 RX ORDER — HYDROCODONE BITARTRATE AND ACETAMINOPHEN 5; 325 MG/1; MG/1
1 TABLET ORAL EVERY 6 HOURS PRN
Status: DISCONTINUED | OUTPATIENT
Start: 2019-09-23 | End: 2019-09-23 | Stop reason: HOSPADM

## 2019-09-23 RX ORDER — CEFAZOLIN SODIUM 1 G/50ML
SOLUTION INTRAVENOUS AS NEEDED
Status: DISCONTINUED | OUTPATIENT
Start: 2019-09-23 | End: 2019-09-23 | Stop reason: SURG

## 2019-09-23 RX ADMIN — ONDANSETRON 4 MG: 2 INJECTION INTRAMUSCULAR; INTRAVENOUS at 10:02

## 2019-09-23 RX ADMIN — FENTANYL CITRATE 25 MCG: 50 INJECTION, SOLUTION INTRAMUSCULAR; INTRAVENOUS at 09:50

## 2019-09-23 RX ADMIN — ONDANSETRON 4 MG: 2 INJECTION INTRAMUSCULAR; INTRAVENOUS at 11:09

## 2019-09-23 RX ADMIN — SODIUM CHLORIDE, SODIUM LACTATE, POTASSIUM CHLORIDE, AND CALCIUM CHLORIDE 60 ML/HR: .6; .31; .03; .02 INJECTION, SOLUTION INTRAVENOUS at 07:48

## 2019-09-23 RX ADMIN — FENTANYL CITRATE 25 MCG: 50 INJECTION, SOLUTION INTRAMUSCULAR; INTRAVENOUS at 09:25

## 2019-09-23 RX ADMIN — FENTANYL CITRATE 25 MCG: 50 INJECTION, SOLUTION INTRAMUSCULAR; INTRAVENOUS at 10:03

## 2019-09-23 RX ADMIN — FENTANYL CITRATE 25 MCG: 50 INJECTION, SOLUTION INTRAMUSCULAR; INTRAVENOUS at 09:33

## 2019-09-23 RX ADMIN — FENTANYL CITRATE 25 MCG: 50 INJECTION INTRAMUSCULAR; INTRAVENOUS at 10:36

## 2019-09-23 RX ADMIN — CEFAZOLIN SODIUM 1000 MG: 1 SOLUTION INTRAVENOUS at 09:35

## 2019-09-23 RX ADMIN — FENTANYL CITRATE 25 MCG: 50 INJECTION INTRAMUSCULAR; INTRAVENOUS at 10:25

## 2019-09-23 RX ADMIN — PROPOFOL 70 MG: 10 INJECTION, EMULSION INTRAVENOUS at 09:24

## 2019-09-23 RX ADMIN — FENTANYL CITRATE 25 MCG: 50 INJECTION INTRAMUSCULAR; INTRAVENOUS at 10:30

## 2019-09-23 NOTE — DISCHARGE INSTRUCTIONS
The principal surgical findings in your knee were:    1  Left knee medial meniscus tear      The following corrective procedures were performed:     1  Arthroscopic partial meniscectomy     FOLLOW-UP:     You will need an appt  in: As scheduled   Please call the office at   GENERAL INSTRUCTIONS:    ·  Apply an ice pack to your knee for the next 12-24 hours  ·  If crutches were prescribed, you should limit weight bearing at all times as instructed  Keep knee stiff the first day, avoid too much bending  ·  Take it easy for at least 24 hours  Do not drive for 3-5 days  You may move about, but stay around home or hotel  ·  If you have an upset stomach, take only cool, clear liquids, such as Gatorade, Jello, or Ginger ale  If nausea persists for more than 25 hours, notify my office  ·  Low grade temperature is not uncommon after surgery  However, if your temperature exceeds 101 degrees, please notify my office  ·  The Novocain in your knee will keep your knee numb until tonight  When the medicine wears off, you will feel pain for several days to one week  This is normal after arthroscopic surgery  ·  On the day after surgery, you may walk normally and bend the knee normally  ·       You may continue using a cane or crutches to minimize any discomfort the first 24 to 48 hours  ·  It is normal to have swelling and discomfort in the knee for several days to one week after arthroscopic surgery  ·       To reduce pain and swelling, place several pillows under your knee for the first 24 to 48 hours  The knee should be elevated above the heart  Ice should be applied to the knee during this period and this will help decrease discomfort and swelling  Apply to the knee for 30 minutes every 2 hours  ·       Any prescription you received before surgery or after surgery should be filled immediately, and taken according to directions on the label   Taking the medicine with food or with a glass of milk will avoid stomach upset  ·       Weight bearing as per instructions from the surgeon  EXERCISES:      Begin doing gentle exercises right away  Exercising will reduce the swelling, increase motion, and prevent muscle weakness  The following exercises should be performed during the first week and a half, following surgery  The advanced knee exercise program will be started when you are seen in the office  1  QUAD SETS: Straighten as straight as possible and then clench the thigh muscles tightly  Keep the muscles clenched tightly to the slow count of 3 then relax  Repeat this exercise 10-30 times every hour  2  STRAIGHT LEG RAISING: With the knee held straight and the quadriceps muscle contracted, raise your leg up 6 to 8 inches and hold it to the slow count of 3  Repeat this exercise 10-30 times every hour  3  RANGE OF MOTION: You should start bending your knee to the point of pain and increase bending it until full motion is obtained  Repeat this exercise 10-30 times every hour  For the first 48 hours inhale deeply and hold your breath for 3 seconds; exhale completely  Repeat 10 times, 4 times daily  If you smoke, avoid cigarettes for 48 hours  BANDAGES:     ·  Your bandage may show blood stains within 1-12 hours  This is motly fluid that was used to irrigate your knee, slightly tinged with blood  It is no cause for concern  However, if your bandage becomes saturated, notify my office right away  ·       You may remove the bulky dressings in 2 days and applied band aids to the small skin incisions  You may shower in 3 days after surgery  WORK:   Plan to take 3 or 4 days off from work  You can resume work when you are comfortable  (This can be a week or more depending on the type of work you do)  Do not walk or stand for excessive periods  Do not operate heavy machinery that requires pedals

## 2019-09-23 NOTE — ANESTHESIA PREPROCEDURE EVALUATION
Review of Systems/Medical History  Patient summary reviewed  Chart reviewed      Cardiovascular  EKG reviewed, Hyperlipidemia, Hypertension , Past MI > 6 months, CAD , History of CABG (2005, 2012, AVR 2012), Dysrhythmias , atrial fibrillation,    Pulmonary       GI/Hepatic            Endo/Other     GYN       Hematology   Musculoskeletal    Arthritis     Neurology   Psychology     Chronic pain (Hx Shingles, chronic pain Right thigh),            Physical Exam    Airway    Mallampati score: I  TM Distance: >3 FB  Neck ROM: full     Dental       Cardiovascular  Rate: normal, No murmur (Holosystolic 3/6),     Pulmonary  Pulmonary exam normal     Other Findings        Anesthesia Plan  ASA Score- 3     Anesthesia Type- general with ASA Monitors  Additional Monitors:   Airway Plan: LMA  Plan Factors-    Induction- intravenous  Postoperative Plan-     Informed Consent- Anesthetic plan and risks discussed with patient and daughter  I personally reviewed this patient with the CRNA  Discussed and agreed on the Anesthesia Plan with the CRNA  Brown Bagley

## 2019-09-23 NOTE — OP NOTE
OPERATIVE REPORT  PATIENT NAME: Tamie Perdue    :  1929  MRN: [id number]  Pt Location:  OR ROOM 01    SURGERY DATE: 2019    Surgeon(s) and Role:     * Lance Gomez MD - Primary     * Jules Connors PA-C - Assisting    Preop Diagnosis:  Acute medial meniscus tear of left knee, initial encounter [S83 242A]    Post-Op Diagnosis Codes:     * Acute medial meniscus tear of left knee, initial encounter [S83 242A]    Procedure(s) (LRB):  ARTHROSCOPY KNEE, LEFT PARTIAL MEDIAL MENISCECTOMY (Left)    Specimen(s):  * No specimens in log *    Estimated Blood Loss:   Minimal    Drains:  * No LDAs found *    Anesthesia Type:   LMA    Operative Indications:  Acute medial meniscus tear of left knee, initial encounter [M50 065M]  Indications: Tamie Perdue is a 80y o  years old female diagnosed with left knee medial meniscus tear, posterior horn  Patient failed conservative treatments and elected to proceed with surgical intervention  The risks and complications are discussed with the patient  The patient consented to the procedure  Procedure: Patient was brought into the OR and placed in supine position  Patient was anesthetized and intubated with LMA without any complications  Patient's left knee was prep and draped in sterile fashion with tourniquet in the upper thigh  A time out was call and identified the left knee was the operating site  3 portals were utilized for instrumentation  Each portal was injected with 1-2 cc of Marcaine 0 5% with epinephrine  A superior-lateral protal was use for the inflow which is set to 35 mmHg  The scope was introduced into the knee from the lateral portal  Inspection of the knee was carried out in counter clockwise fashion  A medial protal was also created for instrumentation  Patellofemoral joint showed grade 2 degenerative changes  Anterior medial plica was not present  Medial compartment showed grade 2 degenerative changes  Medial meniscus was torn in the posterior horn  ACL and PCL were intact  Lateral compartment showed grade 1 degenerative changes  Lateral meniscus was intact  Attention was turn to medial compartment and partial medial meniscectomy about 40% was done back to the stable rim  Using mechanical shaver and biters to carry out the procedure  Excess fluid was drain out of the knee 25 cc of 0 5% Marcaine with epinephrine was injected into the knee joint for post-op pain control  All counts were correct  The incisions were closed with Nylon  A sterile bulky dressing was applied  The patient tolerated the procedure well without any complications  Patient was then extubated and transferred to recovery room for post-op care  The family was contacted  There was no qualified resident available to assist     Mrs Ugarte [place] was required in the OR in helping performing the procedures by manipulating the knee for visualization      Complications:   None    Patient Disposition:  PACU     SIGNATURE: Lexie Nolasco MD  DATE: September 23, 2019  TIME: 10:07 AM

## 2019-09-23 NOTE — INTERVAL H&P NOTE
H&P reviewed  After examining the patient I find no changes in the patients condition since the H&P had been written      Vitals:    09/23/19 0721   BP: 145/84   Pulse: 80   Resp: 16   Temp: 98 °F (36 7 °C)   SpO2: 95%

## 2019-09-23 NOTE — DISCHARGE SUMMARY
[facility] Discharge Note  Imagene Star, 80 y o  female  MRN: [id number]      Preoperative diagnosis: left knee medial meniscus tear    Postoperative diagnosis: left knee medial meniscus tear    Procedure: left knee arthroscopy with partial medial meniscectomy    After procedure, patient was brought to PACU  Pain was controlled with IV and oral pain medication  Patient was discharged to home after cleared by anesthesia and when criteria was met  Condition: good    Discharge medications:   See after visit summary for reconciled discharge medications provided to patient and family  Please refer to discharge instructions for further information

## 2019-10-04 ENCOUNTER — OFFICE VISIT (OUTPATIENT)
Dept: OBGYN CLINIC | Facility: CLINIC | Age: 84
End: 2019-10-04

## 2019-10-04 VITALS
WEIGHT: 122 LBS | DIASTOLIC BLOOD PRESSURE: 70 MMHG | HEART RATE: 78 BPM | HEIGHT: 62 IN | SYSTOLIC BLOOD PRESSURE: 120 MMHG | BODY MASS INDEX: 22.45 KG/M2

## 2019-10-04 DIAGNOSIS — Z47.89 AFTERCARE FOLLOWING SURGERY OF THE MUSCULOSKELETAL SYSTEM: Primary | ICD-10-CM

## 2019-10-04 PROBLEM — M25.562 ACUTE PAIN OF LEFT KNEE: Status: RESOLVED | Noted: 2019-09-10 | Resolved: 2019-10-04

## 2019-10-04 PROBLEM — S83.242A ACUTE MEDIAL MENISCUS TEAR OF LEFT KNEE: Status: RESOLVED | Noted: 2019-09-12 | Resolved: 2019-10-04

## 2019-10-04 PROCEDURE — 99024 POSTOP FOLLOW-UP VISIT: CPT | Performed by: ORTHOPAEDIC SURGERY

## 2019-10-04 NOTE — ASSESSMENT & PLAN NOTE
Status post left knee arthroscopy partial medial meniscectomy  Patient is doing well  I review intraop photos with patient and her daughter  I advised patient to continue low-impact exercises with stationary bike  I will see patient back in 4-6 weeks for re-evaluation  All patient's questions were answered to their satisfaction  This note is created using dictation transcription  It may contain typographical errors, grammatical errors, improperly dictated words, background noise and other errors

## 2019-10-04 NOTE — PROGRESS NOTES
Assessment:     1  Aftercare following surgery of the musculoskeletal system        Plan:     Problem List Items Addressed This Visit        Other    Aftercare following surgery of the musculoskeletal system - Primary     Status post left knee arthroscopy partial medial meniscectomy  Patient is doing well  I review intraop photos with patient and her daughter  I advised patient to continue low-impact exercises with stationary bike  I will see patient back in 4-6 weeks for re-evaluation  All patient's questions were answered to their satisfaction  This note is created using dictation transcription  It may contain typographical errors, grammatical errors, improperly dictated words, background noise and other errors  Subjective:     Patient ID: Carline Lynne is a 80 y o  female  Chief Complaint:  72-year-old female status post left knee arthroscopy partial medial meniscectomy  Patient is doing well  She denies fever, chills, or sweats  She is walking without any assistive device  She still has some swelling over her knee and left lower legs  She denies pain in her calf  Her pain is much improved compared to before surgery      Allergy:  Allergies   Allergen Reactions    Heparin      Annotation - [id number]: LOW PLATELETS    Phenazopyridine      As per daughter "Not appropriate for pt due to heart condition"     Medications:  all current active meds have been reviewed  Past Medical History:  Past Medical History:   Diagnosis Date    Acute myocardial infarction [facility])     Atrial fibrillation ([facility] [address] )     CAD (coronary artery disease)     [facility]     skin cancer removed from left leg    Cellulitis      AND ABSCESS    Cholecystitis     Closed Colles' fracture     OF THE LEFT WRIST; LAST ASSESSED: 5/2/14    Closed fracture of radius     LAST ASSESSED: 2/19/14    Dyspnea     Hair loss disorder     Hematuria     History of being hospitalized     1/5/12-1/14/12 Juan Diego MACIEL PROCEDURES: 1) REDO STERNOTOMY AND AORTIC VALVE REPLACEMENT WITH A 19-MM REBA-PETERS BOVINIE PERICARDIAL MAGNA EASE VALVE 2) TRANSESOPHAGEAL ECHOCARDIOGRAM     Hypercholesterolemia     Hypertension     Incomplete bladder emptying     Leg wound, left     Malaise and fatigue     Sick sinus syndrome (HCC)     Urinary tract infection      Past Surgical History:  Past Surgical History:   Procedure Laterality Date    AORTIC VALVE REPLACEMENT  01/2012    HEART VALVE    CARDIAC SURGERY      CATARACT EXTRACTION W/  INTRAOCULAR LENS IMPLANT  08/26/2010    Facundo Lucas MD; PHACOEMULISIFICATION; INSERTION INTRAOCULAR LENS OD    CORONARY ARTERY BYPASS GRAFT  01/2005    LEG SURGERY      CT KNEE SCOPE,MED/LAT MENISECTOMY Left 9/23/2019    Procedure: ARTHROSCOPY KNEE, LEFT PARTIAL MEDIAL MENISCECTOMY;  Surgeon: Tiburcio Herrera MD;  Location:  MAIN OR;  Service: Orthopedics     Family History:  Family History   Problem Relation Age of Onset    Diabetes Mother         MELLITUS    Hypertension Mother     Heart disease Mother     Prostate cancer Father     Dementia Brother     Heart disease Maternal Grandmother     Hypertension Maternal Grandmother     Heart disease Maternal Grandfather     Hypertension Family     Osteoporosis Family     Substance Abuse Neg Hx     Mental illness Neg Hx      Social History:  Social History     Substance and Sexual Activity   Alcohol Use Never    Frequency: Never    Comment: social     Social History     Substance and Sexual Activity   Drug Use No     Social History     Tobacco Use   Smoking Status Former Smoker   Smokeless Tobacco Never Used   Tobacco Comment    QUIT 36 YEARS AGO ALSO NEVER A SMOKER AS PER [employer]TS     Review of Systems   Constitutional: Negative  HENT: Negative  Eyes: Negative  Respiratory: Negative  Cardiovascular: Negative  Gastrointestinal: Negative  Endocrine: Negative  Genitourinary: Negative      Musculoskeletal: Positive for arthralgias (Left knee) and joint swelling (Left knee and lower legs)  Negative for gait problem  Skin: Negative  Allergic/Immunologic: Negative  Hematological: Negative  Psychiatric/Behavioral: Negative  Objective:  BP Readings from Last 1 Encounters:   10/04/19 120/70      Wt Readings from Last 1 Encounters:   10/04/19 55 3 kg (122 lb)      BMI:   Estimated body mass index is 22 31 kg/m² as calculated from the following:    Height as of this encounter: 5' 2" (1 575 m)  Weight as of this encounter: 55 3 kg (122 lb)  BSA:   Estimated body surface area is 1 55 meters squared as calculated from the following:    Height as of this encounter: 5' 2" (1 575 m)  Weight as of this encounter: 55 3 kg (122 lb)  Physical Exam   Constitutional: She is oriented to person, place, and time  She appears well-developed  HENT:   Head: Normocephalic and atraumatic  Eyes: Conjunctivae and EOM are normal    Neck: Neck supple  Pulmonary/Chest: Effort normal    Musculoskeletal:        Left knee: She exhibits effusion (Trace)  Neurological: She is alert and oriented to person, place, and time  Skin: Skin is warm  Psychiatric: She has a normal mood and affect  Nursing note and vitals reviewed  Left Knee Exam     Tenderness   The patient is experiencing no tenderness  Range of Motion   The patient has normal left knee ROM  Tests   Pasquale:  Medial - negative Lateral - negative  Varus: negative Valgus: negative  Patellar apprehension: negative    Other   Erythema: absent  Scars: present (Incisions healing well    No signs of infection)  Sensation: normal  Pulse: present  Swelling: mild  Effusion: effusion (Trace) present            No new images for review

## 2019-10-08 ENCOUNTER — CONSULT (OUTPATIENT)
Dept: UROLOGY | Facility: HOSPITAL | Age: 84
End: 2019-10-08
Attending: INTERNAL MEDICINE
Payer: MEDICARE

## 2019-10-08 VITALS
HEIGHT: 62 IN | HEART RATE: 76 BPM | SYSTOLIC BLOOD PRESSURE: 112 MMHG | WEIGHT: 122 LBS | BODY MASS INDEX: 22.45 KG/M2 | DIASTOLIC BLOOD PRESSURE: 72 MMHG

## 2019-10-08 DIAGNOSIS — R33.9 INCOMPLETE BLADDER EMPTYING: Primary | ICD-10-CM

## 2019-10-08 DIAGNOSIS — R35.1 NOCTURIA: ICD-10-CM

## 2019-10-08 DIAGNOSIS — R35.0 URINARY FREQUENCY: ICD-10-CM

## 2019-10-08 LAB
POST-VOID RESIDUAL VOLUME, ML POC: 144 ML
SL AMB  POCT GLUCOSE, UA: NORMAL
SL AMB LEUKOCYTE ESTERASE,UA: NORMAL
SL AMB POCT BILIRUBIN,UA: NORMAL
SL AMB POCT BLOOD,UA: NORMAL
SL AMB POCT CLARITY,UA: CLEAR
SL AMB POCT COLOR,UA: YELLOW
SL AMB POCT KETONES,UA: NORMAL
SL AMB POCT NITRITE,UA: NORMAL
SL AMB POCT PH,UA: 6.5
SL AMB POCT SPECIFIC GRAVITY,UA: 1.01
SL AMB POCT URINE PROTEIN: NORMAL
SL AMB POCT UROBILINOGEN: 0.2

## 2019-10-08 PROCEDURE — 81002 URINALYSIS NONAUTO W/O SCOPE: CPT | Performed by: UROLOGY

## 2019-10-08 PROCEDURE — 99214 OFFICE O/P EST MOD 30 MIN: CPT | Performed by: UROLOGY

## 2019-10-08 PROCEDURE — 51798 US URINE CAPACITY MEASURE: CPT | Performed by: UROLOGY

## 2019-10-08 NOTE — ASSESSMENT & PLAN NOTE
I explained to the patient and her daughter that unfortunately there are no good medications to help her empty her bladder better  We discussed that although she does not empty her volumes are not high enough to be damaging her kidneys  The patient is already on estrogen cream for her atrophic vaginitis which I think is causing her burning and itching  We discussed the possibility of self catheterization expectantly at night to decrease her nocturia but the patient would like to hold off  She will follow up on an as-needed basis

## 2019-10-08 NOTE — PROGRESS NOTES
Assessment/Plan:    Incomplete bladder emptying  I explained to the patient and her daughter that unfortunately there are no good medications to help her empty her bladder better  We discussed that although she does not empty her volumes are not high enough to be damaging her kidneys  The patient is already on estrogen cream for her atrophic vaginitis which I think is causing her burning and itching  We discussed the possibility of self catheterization expectantly at night to decrease her nocturia but the patient would like to hold off  She will follow up on an as-needed basis  Diagnoses and all orders for this visit:    Incomplete bladder emptying    Urinary frequency  -     Ambulatory referral to Urology  -     POCT urine dip  -     POCT Measure PVR    Nocturia  -     Ambulatory referral to Urology  -     POCT urine dip  -     POCT Measure PVR          Total visit time was 60 minutes of which over 50% was spent on counseling  Subjective:     Patient ID: Amanuel Camacho is a 80 y o  female    51-year-old female presents for evaluation of a feeling of incomplete emptying  The patient is also having some vaginal itching and burning with urination  She is on Premarin cream   She denies any gross hematuria  She did have a recent UTI  She denies any severe frequency or urgency  She does note that she does urinate and empty better when she takes her diuretic  She has no other complaints  The following portions of the patient's history were reviewed and updated as appropriate: allergies, current medications, past family history, past medical history, past social history, past surgical history and problem list     Review of Systems   Constitutional: Negative  HENT: Negative  Eyes: Negative  Respiratory: Negative  Cardiovascular: Negative  Gastrointestinal: Negative  Endocrine: Negative  Genitourinary:        As noted per HPI   Musculoskeletal: Negative  Skin: Negative  Allergic/Immunologic: Negative  Neurological: Negative  Hematological: Negative  Psychiatric/Behavioral: Negative  Urinary Incontinence Screening      Most Recent Value   Urinary Incontinence   Urinary Incontinence? No   Incomplete emptying? Yes   Urinary frequency? No   Urinary urgency? Yes   Urinary hesitancy? Yes   Dysuria (painful difficult urination)? Yes   Nocturia (waking up to use the bathroom)? Yes [2-3x]   Straining (having to push to go)? Yes   Weak stream?  No   Intermittent stream?  Yes   Post void dribbling? No          Objective:    Physical Exam   Constitutional: She is oriented to person, place, and time  She appears well-developed and well-nourished  HENT:   Head: Normocephalic and atraumatic  Neck: Normal range of motion  Neck supple  Cardiovascular: Intact distal pulses  Pulmonary/Chest: Effort normal    Abdominal: Soft  Bowel sounds are normal  She exhibits no distension and no mass  There is no tenderness  There is no rebound and no guarding  Musculoskeletal: Normal range of motion  Neurological: She is alert and oriented to person, place, and time  Skin: Skin is warm and dry  Psychiatric: She has a normal mood and affect  Vitals reviewed          Results  No results found for: PSA  Lab Results   Component Value Date    GLUCOSE 92 08/04/2015    CALCIUM 8 8 09/16/2019     08/04/2015    K 3 7 09/16/2019    CO2 28 09/16/2019     09/16/2019    BUN 17 09/16/2019    CREATININE 1 07 09/16/2019     Lab Results   Component Value Date    WBC 8 43 09/16/2019    HGB 11 4 (L) 09/16/2019    HCT 36 0 09/16/2019    MCV 88 09/16/2019     09/16/2019       Recent Results (from the past 1 hour(s))   POCT urine dip    Collection Time: 10/08/19  1:58 PM   Result Value Ref Range    LEUKOCYTE ESTERASE,UA +++     NITRITE,UA -     SL AMB POCT UROBILINOGEN 0 2     POCT URINE PROTEIN -      PH,UA 6 5     BLOOD,UA moderate     SPECIFIC GRAVITY,UA 1 010 9[address] [facility] +     GLUCOSE, UA -      COLOR,UA yellow     CLARITY,UA clear    POCT Measure PVR    Collection Time: 10/08/19  2:00 PM   Result Value Ref Range    POST-VOID RESIDUAL VOLUME, ML  mL   ]

## 2019-10-21 ENCOUNTER — TELEPHONE (OUTPATIENT)
Dept: CARDIOLOGY CLINIC | Facility: CLINIC | Age: 84
End: 2019-10-21

## 2019-10-21 NOTE — TELEPHONE ENCOUNTER
Pt's daughter Bartolo Felix called stating pt feels as though she is in AFIB all the time  Pt f/u is due approx 12/11/19  Scheduled pt for f/u (next avail) Mon 12/2 @ 11:00 am and in the mean time a nurse visit tomorrow 10/22 @ 11:30 am       Please advise with further instructions

## 2019-10-22 ENCOUNTER — CLINICAL SUPPORT (OUTPATIENT)
Dept: CARDIOLOGY CLINIC | Facility: CLINIC | Age: 84
End: 2019-10-22
Payer: MEDICARE

## 2019-10-22 VITALS
HEART RATE: 78 BPM | SYSTOLIC BLOOD PRESSURE: 112 MMHG | HEIGHT: 62 IN | BODY MASS INDEX: 22.31 KG/M2 | DIASTOLIC BLOOD PRESSURE: 74 MMHG

## 2019-10-22 DIAGNOSIS — I48.0 PAROXYSMAL ATRIAL FIBRILLATION (HCC): Primary | ICD-10-CM

## 2019-10-22 PROCEDURE — 93000 ELECTROCARDIOGRAM COMPLETE: CPT | Performed by: INTERNAL MEDICINE

## 2019-10-22 NOTE — PROGRESS NOTES
Pt here due to pt feeling as though she is going in and out of AFIB more frequently  EKG was performed under direction of Dr Dakotah Claire Shows stated pt is in AFIB today and to inform pt's Cardiologist, Dr Ismael Gatica for instruction  No changes were made

## 2019-10-29 ENCOUNTER — OFFICE VISIT (OUTPATIENT)
Dept: FAMILY MEDICINE CLINIC | Facility: HOSPITAL | Age: 84
End: 2019-10-29
Payer: MEDICARE

## 2019-10-29 VITALS
HEIGHT: 62 IN | WEIGHT: 122 LBS | HEART RATE: 84 BPM | BODY MASS INDEX: 22.45 KG/M2 | SYSTOLIC BLOOD PRESSURE: 122 MMHG | DIASTOLIC BLOOD PRESSURE: 70 MMHG | OXYGEN SATURATION: 94 %

## 2019-10-29 DIAGNOSIS — I48.0 PAROXYSMAL ATRIAL FIBRILLATION (HCC): Primary | ICD-10-CM

## 2019-10-29 DIAGNOSIS — R60.0 LOCALIZED EDEMA: ICD-10-CM

## 2019-10-29 DIAGNOSIS — N18.30 CKD (CHRONIC KIDNEY DISEASE) STAGE 3, GFR 30-59 ML/MIN (HCC): ICD-10-CM

## 2019-10-29 DIAGNOSIS — I10 ESSENTIAL HYPERTENSION: ICD-10-CM

## 2019-10-29 DIAGNOSIS — Z95.2 HISTORY OF AORTIC VALVE REPLACEMENT: ICD-10-CM

## 2019-10-29 DIAGNOSIS — I35.9 AORTIC VALVE DISORDER: ICD-10-CM

## 2019-10-29 PROCEDURE — 99213 OFFICE O/P EST LOW 20 MIN: CPT | Performed by: INTERNAL MEDICINE

## 2019-10-29 NOTE — ASSESSMENT & PLAN NOTE
Having symptoms with some weakness intermittently for over a month- was seen in Cardiology office last week and in atrial fib- last episode was walking at [address] and felt lightheaded briefly  is on amiodarone 100 mg daily     Daughter is awaiting to hear from Dr King Padilla office aobut this issues- was in in their office last week and seen by NP

## 2019-10-29 NOTE — PROGRESS NOTES
Assessment/Plan:             Problem List Items Addressed This Visit        Cardiovascular and Mediastinum    Aortic valve disorder    Relevant Orders    Holter monitor - 24 hour    Essential hypertension    Paroxysmal atrial fibrillation ([address] ) - Primary     Having symptoms with some weakness intermittently for over a month- was seen in Cardiology office last week and in atrial fib- last episode was walking at [address] and felt lightheaded briefly  is on amiodarone 100 mg daily  Daughter is awaiting to hear from Dr aPrish Arango office aobut this issues- was in in their office last week and seen by NP         Relevant Orders    Holter monitor - 24 hour       Genitourinary    CKD (chronic kidney disease) stage 3, GFR 30-59 ml/min ([facility])       Other    History of aortic valve replacement    Localized edema     Mild lower ext edema- likely venous insufficieny also on amlodipine for hypertension                 Subjective:      Patient ID: Maria A Luna is a 80 y o  female    1  Daughters notice some more fatigue with walking the steps or ambulating seem slightly short of breath  Patient denies increase in her leg edema over the current baseline  She denies any chest pain or lightheadedness  She was seen by Cardiology as per above susan castellano and I did do EKG here today which shows atrial fib with controlled rate      The following portions of the patient's history were reviewed and updated as appropriate: allergies, current medications and problem list      Review of Systems   Constitutional: Positive for fatigue  Negative for chills and fever  Respiratory: Negative for wheezing  Cardiovascular: Positive for leg swelling           Objective:      Current Outpatient Medications:     amiodarone 200 mg tablet, Take 0 5 tablets (100 mg total) by mouth daily, Disp: 45 tablet, Rfl: 3    amLODIPine (NORVASC) 2 5 mg tablet, Take 1 tablet (2 5 mg total) by mouth daily, Disp: , Rfl: 0    apixaban (ELIQUIS) 5 mg, Take 1 tablet (5 mg total) by mouth 2 (two) times a day, Disp: 180 tablet, Rfl: 1    atorvastatin (LIPITOR) 20 mg tablet, Take 1 tablet (20 mg total) by mouth daily (Patient taking differently: Take 20 mg by mouth every evening ), Disp: 90 tablet, Rfl: 3    clotrimazole-betamethasone (LOTRISONE) 1-0 05 % cream, Apply topically 2 (two) times a day, Disp: 45 g, Rfl: 1    Coenzyme Q10 (COQ10) 100 MG CAPS, Take 1 capsule by mouth Daily, Disp: , Rfl:     conjugated estrogens (PREMARIN) vaginal cream, Apply a pea size amount of the estrogen cream to the opening of the vagina three nights per week , Disp: , Rfl:     gabapentin (NEURONTIN) 100 mg capsule, Take 100 mg by mouth daily at bedtime , Disp: , Rfl:     Multiple Vitamins-Minerals (OCUVITE EXTRA) TABS, Take 1 tablet by mouth daily, Disp: , Rfl:     Omega-3 1000 MG CAPS, Take by mouth, Disp: , Rfl:     potassium chloride (K-DUR,KLOR-CON) 10 mEq tablet, Take 1 tablet (10 mEq total) by mouth 2 (two) times a day, Disp: 60 tablet, Rfl: 2    torsemide (DEMADEX) 20 mg tablet, Take 1 tablet (20 mg total) by mouth daily, Disp: 30 tablet, Rfl: 6    Blood pressure 122/70, pulse 84, height 5' 2" (1 575 m), weight 55 3 kg (122 lb), SpO2 94 %, not currently breastfeeding  Physical Exam   Constitutional: She appears well-developed  No distress  HENT:   Head: Normocephalic  Eyes: Right eye exhibits no discharge  Left eye exhibits no discharge  Cardiovascular:   Murmur heard  irreg irreg at 76  [place] aortic valve closure- grade 1 systolic murumur     Pulmonary/Chest: Effort normal and breath sounds normal  She has no wheezes  She has no rales  Abdominal: Soft  Bowel sounds are normal  She exhibits no distension  There is no tenderness  Musculoskeletal:   chornci skin changes in legs with trace ankle edema   Lymphadenopathy:     She has no cervical adenopathy  Psychiatric: She has a normal mood and affect   Her behavior is normal  Judgment and thought content normal    Nursing note and vitals reviewed

## 2019-10-30 NOTE — TELEPHONE ENCOUNTER
With reviewing her prior EKGs her echo from July, she has appear to be in atrial fibrillation at least since our last appointment  What exactly is her symptoms of her atrial fibrillation considering she now appears to be in this chronically? ? Also, she saw her internist Dr Julita Bryan who ordered a Holter monitor  Can we facilitate this, because her only option from an EP standpoint would be a pacemaker but I am not sure that would make her feel any better  I would also see if she can get in to an appointment with [facility]  Thank you

## 2019-10-31 ENCOUNTER — OFFICE VISIT (OUTPATIENT)
Dept: OBGYN CLINIC | Facility: CLINIC | Age: 84
End: 2019-10-31

## 2019-10-31 VITALS
WEIGHT: 122 LBS | HEIGHT: 62 IN | BODY MASS INDEX: 22.45 KG/M2 | DIASTOLIC BLOOD PRESSURE: 78 MMHG | HEART RATE: 70 BPM | SYSTOLIC BLOOD PRESSURE: 118 MMHG

## 2019-10-31 DIAGNOSIS — Z47.89 AFTERCARE FOLLOWING SURGERY OF THE MUSCULOSKELETAL SYSTEM: Primary | ICD-10-CM

## 2019-10-31 PROCEDURE — 99024 POSTOP FOLLOW-UP VISIT: CPT | Performed by: PHYSICIAN ASSISTANT

## 2019-10-31 NOTE — PROGRESS NOTES
Assessment:     1  Aftercare following surgery of the musculoskeletal system        Plan:     Problem List Items Addressed This Visit        Other    Aftercare following surgery of the musculoskeletal system - Primary     Status post left knee arthroscopy partial medial meniscectomy  Patient is doing excellent  Continue low-impact exercises  Follow-up as needed if any problems arise  All patient's questions were answered to their satisfaction  This note is created using dictation transcription  It may contain typographical errors, grammatical errors, improperly dictated words, background noise and other errors  Subjective:     Patient ID: González Hamm is a 80 y o  female  Chief Complaint:  27-year-old female status post left knee arthroscopy partial medial meniscectomy on September 23, 2019  She is doing very well  She is back walking 3 miles at a time for exercise  The pain that she had prior to surgery has resolved  She is ambulating with no assistance      Allergy:  Allergies   Allergen Reactions    Heparin      Annotation - [id number]: LOW PLATELETS    Phenazopyridine      As per daughter "Not appropriate for pt due to heart condition"     Medications:  all current active meds have been reviewed  Past Medical History:  Past Medical History:   Diagnosis Date    Acute myocardial infarction [facility])     Atrial [facility] )     CAD (coronary artery disease)     [facility] )     skin cancer removed from left leg    Cellulitis      AND ABSCESS    Cholecystitis     Closed Colles' fracture     OF THE LEFT WRIST; LAST ASSESSED: 5/2/14    Closed fracture of radius     LAST ASSESSED: 2/19/14    Dyspnea     Hair loss disorder     Hematuria     History of being hospitalized     1/5/12-1/14/12 Juan Diego MACIEL PROCEDURES: 1) REDO STERNOTOMY AND AORTIC VALVE REPLACEMENT WITH A 19-MM REBA-PETERS BOVINIE PERICARDIAL MAGNA EASE VALVE 2) TRANSESOPHAGEAL ECHOCARDIOGRAM     Hypercholesterolemia     Hypertension     Incomplete bladder emptying     Leg wound, left     Malaise and fatigue     Sick sinus syndrome (HCC)     Urinary tract infection      Past Surgical History:  Past Surgical History:   Procedure Laterality Date    AORTIC VALVE REPLACEMENT  01/2012    HEART VALVE    CARDIAC SURGERY      CATARACT EXTRACTION W/  INTRAOCULAR LENS IMPLANT  08/26/2010    Sylvia Castillo MD; PHACOEMULISIFICATION; INSERTION INTRAOCULAR LENS OD    CORONARY ARTERY BYPASS GRAFT  01/2005    LEG SURGERY      VT KNEE SCOPE,MED/LAT MENISECTOMY Left 9/23/2019    Procedure: ARTHROSCOPY KNEE, LEFT PARTIAL MEDIAL MENISCECTOMY;  Surgeon: Lane Keller MD;  Location:  MAIN OR;  Service: Orthopedics     Family History:  Family History   Problem Relation Age of Onset    Diabetes Mother         MELLITUS    Hypertension Mother     Heart disease Mother     Prostate cancer Father     Dementia Brother     Heart disease Maternal Grandmother     Hypertension Maternal Grandmother     Heart disease Maternal Grandfather     Hypertension Family     Osteoporosis Family     Substance Abuse Neg Hx     Mental illness Neg Hx      Social History:  Social History     Substance and Sexual Activity   Alcohol Use Never    Frequency: Never    Comment: social     Social History     Substance and Sexual Activity   Drug Use No     Social History     Tobacco Use   Smoking Status Former Smoker   Smokeless Tobacco Never Used   Tobacco Comment    QUIT 36 YEARS AGO ALSO NEVER A SMOKER AS PER [employer]TS     Review of Systems   Constitutional: Negative  HENT: Negative  Eyes: Negative  Respiratory: Negative  Cardiovascular: Negative  Gastrointestinal: Negative  Endocrine: Negative  Genitourinary: Negative  Musculoskeletal: Negative for arthralgias (Left knee) and gait problem  Skin: Negative  Allergic/Immunologic: Negative  Hematological: Negative  Psychiatric/Behavioral: Negative  Objective:  BP Readings from Last 1 Encounters:   10/31/19 118/78      Wt Readings from Last 1 Encounters:   10/31/19 55 3 kg (122 lb)      BMI:   Estimated body mass index is 22 31 kg/m² as calculated from the following:    Height as of this encounter: 5' 2" (1 575 m)  Weight as of this encounter: 55 3 kg (122 lb)  BSA:   Estimated body surface area is 1 55 meters squared as calculated from the following:    Height as of this encounter: 5' 2" (1 575 m)  Weight as of this encounter: 55 3 kg (122 lb)  Physical Exam   Constitutional: She is oriented to person, place, and time  She appears well-developed  HENT:   Head: Normocephalic and atraumatic  Eyes: Conjunctivae and EOM are normal    Neck: Neck supple  Pulmonary/Chest: Effort normal    Musculoskeletal:        Left knee: She exhibits no effusion  Neurological: She is alert and oriented to person, place, and time  Skin: Skin is warm  Psychiatric: She has a normal mood and affect  Nursing note and vitals reviewed  Left Knee Exam     Muscle Strength   The patient has normal left knee strength  Tenderness   The patient is experiencing no tenderness  Range of Motion   The patient has normal left knee ROM  Tests   Pasquale:  Medial - negative Lateral - negative  Varus: negative Valgus: negative  Patellar apprehension: negative    Other   Erythema: absent  Scars: present (Incisions healed well    No signs of infection)  Sensation: normal  Pulse: present  Swelling: none  Effusion: no effusion present            No new images for review

## 2019-10-31 NOTE — ASSESSMENT & PLAN NOTE
Status post left knee arthroscopy partial medial meniscectomy  Patient is doing excellent  Continue low-impact exercises  Follow-up as needed if any problems arise  All patient's questions were answered to their satisfaction  This note is created using dictation transcription  It may contain typographical errors, grammatical errors, improperly dictated words, background noise and other errors

## 2019-11-06 ENCOUNTER — HOSPITAL ENCOUNTER (OUTPATIENT)
Dept: NON INVASIVE DIAGNOSTICS | Facility: CLINIC | Age: 84
Discharge: HOME | End: 2019-11-06
Payer: MEDICARE

## 2019-11-06 DIAGNOSIS — I35.9 AORTIC VALVE DISORDER: ICD-10-CM

## 2019-11-06 DIAGNOSIS — I48.0 PAROXYSMAL ATRIAL FIBRILLATION (HCC): ICD-10-CM

## 2019-11-06 PROCEDURE — 93226 XTRNL ECG REC<48 HR SCAN A/R: CPT

## 2019-11-06 PROCEDURE — 93225 XTRNL ECG REC<48 HRS REC: CPT

## 2019-11-08 PROCEDURE — 93227 XTRNL ECG REC<48 HR R&I: CPT | Performed by: INTERNAL MEDICINE

## 2019-11-13 ENCOUNTER — TELEPHONE (OUTPATIENT)
Dept: FAMILY MEDICINE CLINIC | Facility: HOSPITAL | Age: 84
End: 2019-11-13

## 2019-11-13 NOTE — TELEPHONE ENCOUNTER
I spoke with pt daughter she is declining the appt for now she said pt was just in, she believes its because pt is in AFIB , and she will wait for Cardiology appt DD    ----- Message from Lori Mooney DO sent at 11/12/2019  5:18 PM EST -----  Regarding: RE: AFIB  Ok to put her in for appt with me on friday  ----- Message -----  From: Kaela Carmona  Sent: 11/12/2019   3:27 PM EST  To: Lori Mooney DO  Subject: AFIB                                             Pt daughter said pt has card appt Dec 8th is that ok can she wait that long? also pt is a little off, she wanted you to know that DD

## 2019-12-02 ENCOUNTER — OFFICE VISIT (OUTPATIENT)
Dept: CARDIOLOGY CLINIC | Facility: CLINIC | Age: 84
End: 2019-12-02
Payer: MEDICARE

## 2019-12-02 VITALS
DIASTOLIC BLOOD PRESSURE: 88 MMHG | HEIGHT: 62 IN | HEART RATE: 73 BPM | BODY MASS INDEX: 22.52 KG/M2 | SYSTOLIC BLOOD PRESSURE: 150 MMHG | WEIGHT: 122.4 LBS

## 2019-12-02 DIAGNOSIS — Z95.1 HX OF CABG: ICD-10-CM

## 2019-12-02 DIAGNOSIS — Z95.2 HISTORY OF AORTIC VALVE REPLACEMENT: ICD-10-CM

## 2019-12-02 DIAGNOSIS — I25.10 CORONARY ARTERY DISEASE INVOLVING NATIVE CORONARY ARTERY OF NATIVE HEART WITHOUT ANGINA PECTORIS: ICD-10-CM

## 2019-12-02 DIAGNOSIS — I48.0 PAROXYSMAL ATRIAL FIBRILLATION (HCC): Primary | ICD-10-CM

## 2019-12-02 DIAGNOSIS — E78.00 PURE HYPERCHOLESTEROLEMIA: ICD-10-CM

## 2019-12-02 DIAGNOSIS — I49.5 SICK SINUS SYNDROME (HCC): ICD-10-CM

## 2019-12-02 PROCEDURE — 93000 ELECTROCARDIOGRAM COMPLETE: CPT | Performed by: INTERNAL MEDICINE

## 2019-12-02 PROCEDURE — 99214 OFFICE O/P EST MOD 30 MIN: CPT | Performed by: INTERNAL MEDICINE

## 2019-12-02 NOTE — PROGRESS NOTES
Cardiology Follow Up    Baltazar Simon  7/30/1929  [id number]  HEART & VASCULAR  [facility] [place]  [id number]  [address] [address] [address]    1  Paroxysmal atrial fibrillation (HCC)  POCT ECG   2  Coronary artery disease involving native coronary artery of native heart without angina pectoris     3  Hx of CABG     4  Pure hypercholesterolemia     5  History of aortic valve replacement     6  Sick sinus syndrome [facility])         Interval History:     Mrs Raghavendra Farmer comes in for follow-up given her history of paroxysmal and recurrent atrial fibrillation, along with her prior history of CAD and valvular heart disease  She is status post CABG in 2005 after being found to have multivessel disease, but then had progression of aortic valve disease and had a bioprosthetic aortic valve replaced in 2012  She had been on Multaq antiarrhythmic therapy for her AF, but seemed to be having breakthrough symptoms around our last appointment and was going to lose insurance coverage of this  She has had an intolerance to beta-blocker in the past likely due to bradycardia  Her echocardiograms through the years of show normal LV systolic function and a normally functioning bioprosthetic aortic valve replacement  More recently we have been dealing with issues both recurrent atrial fibrillation and bradycardia  In follow-up we changed her from Multaq to amiodarone  We also initiated Eliquis for stroke prevention  She had once been on Coumadin but did not tolerate this from a bleeding standpoint  She does tolerate Eliquis  Over the last year or so we have been dealing with issues of bradycardia  Some of her ECG showed marked sinus bradycardia vs a junctional rhythm  However she felt well even when she was bradycardic    Then within the last few months she went back into atrial fibrillation, but her heart rates are actually better around 70-80 bpm   She wore a Holter monitor that showed controlled atrial fibrillation  Marin Garay at 1st did not feel well when she went back into atrial fibrillation  She felt somewhat nauseous with headaches, but these were very nondescript symptoms  She did not have any lightheadedness or palpitations  Since getting an ECG in our office last month she has felt better and is essentially asymptomatic  She does escobedo with some fatigue and some morning nauseous Stark, but throughout the day she has felt well  She goes for walks on a regular basis without issues  She battles with some intermittent lower extremity edema, but she uses torsemide just as needed  She denies chest pain or any symptoms of angina  No shortness of breath  No palpitations, lightheadedness or any syncope        Patient Active Problem List   Diagnosis    Hx of CABG    Aortic valve disorder    Coronary artery disease    Essential hypertension    Hyperlipidemia    Paroxysmal atrial fibrillation (HCC)    History of aortic valve replacement    Arteriosclerosis of carotid artery    Cervical spine arthritis    Cholelithiasis without cholecystitis    Chronic cervical radiculopathy    Dystrophy of vulva    Mixed stress and urge urinary incontinence    Neural foraminal stenosis of cervical spine    Occipital neuralgia of right side    Osteoarthritis    Peripheral neuropathy    Bradycardia    Gastroesophageal reflux disease with esophagitis    Post herpetic neuralgia    Lower extremity edema    Pes anserinus bursitis of left knee    CKD (chronic kidney disease) stage 3, GFR 30-59 ml/min ([address] )    Aftercare following surgery of the musculoskeletal system    Incomplete bladder emptying    Localized edema    Sick sinus syndrome ([address] )     Past Medical History:   Diagnosis Date    Acute myocardial infarction ([address] )     Atrial fibrillation ([address] )     CAD (coronary artery disease)     Cancer ([address] )     skin cancer removed from left leg    Cellulitis      AND ABSCESS    Cholecystitis     Closed Colles' fracture     OF THE LEFT WRIST; LAST ASSESSED: 5/2/14    Closed fracture of radius     LAST ASSESSED: 2/19/14    Dyspnea     Hair loss disorder     Hematuria     History of being hospitalized     1/5/12-1/14/12 Juan Diego Jan MACIEL PROCEDURES: 1) REDO STERNOTOMY AND AORTIC VALVE REPLACEMENT WITH A 19-MM REBA-PETERS BOVINIE PERICARDIAL MAGNA EASE VALVE 2) TRANSESOPHAGEAL ECHOCARDIOGRAM     Hypercholesterolemia     Hypertension     Incomplete bladder emptying     Leg wound, left     Malaise and fatigue     Sick sinus syndrome (HCC)     Urinary tract infection      Social History     Socioeconomic History    Marital status:       Spouse name: Not on file    Number of children: Not on file    Years of education: Not on file    Highest education level: Not on file   Occupational History    Occupation: RETIRED   Social Needs    Financial resource strain: Not on file    Food insecurity:     Worry: Not on file     Inability: Not on file    Transportation needs:     Medical: No     Non-medical: No   Tobacco Use    Smoking status: Former Smoker    Smokeless tobacco: Never Used    Tobacco comment: QUIT 40 YEARS AGO ALSO NEVER A SMOKER AS PER [employer]   Substance and Sexual Activity    Alcohol use: Never     Frequency: Never     Comment: social    Drug use: No    Sexual activity: Not Currently   Lifestyle    Physical activity:     Days per week: Not on file     Minutes per session: Not on file    Stress: Not on file   Relationships    Social connections:     Talks on phone: Not on file     Gets together: Not on file     Attends Druze service: Not on file     Active member of club or organization: Not on file     Attends meetings of clubs or organizations: Not on file     Relationship status: Not on file    Intimate partner violence:     Fear of current or ex partner: Not on file     Emotionally abused: Not on file     Physically abused: Not on file     Forced sexual activity: Not on file   Other Topics Concern    Not on file   Social History Narrative    Lives with son  Feels safe  Has living will      ACTIVE ADVANCE DIRECTIVE    CAFFEINE USE: 1 1/2 CUPS COFFEE QD    EXERCISE HABITS    GOOD DENTAL HYGIENE    LIVING SITUATION: SUPPORTIVE AND SAFE      Family History   Problem Relation Age of Onset    Diabetes Mother         MELLITUS    Hypertension Mother     Heart disease Mother     Prostate cancer Father     Dementia Brother     Heart disease Maternal Grandmother     Hypertension Maternal Grandmother     Heart disease Maternal Grandfather     Hypertension Family     Osteoporosis Family     Substance Abuse Neg Hx     Mental illness Neg Hx      Past Surgical History:   Procedure Laterality Date    AORTIC VALVE REPLACEMENT  01/2012    HEART VALVE    CARDIAC SURGERY      CATARACT EXTRACTION W/  INTRAOCULAR LENS IMPLANT  08/26/2010    Shanita Garcia MD; PHACOEMULISIFICATION; INSERTION INTRAOCULAR LENS OD    CORONARY ARTERY BYPASS GRAFT  01/2005    LEG SURGERY      SC KNEE SCOPE,MED/LAT MENISECTOMY Left 9/23/2019    Procedure: ARTHROSCOPY KNEE, LEFT PARTIAL MEDIAL MENISCECTOMY;  Surgeon: Baldemar Zapata MD;  [facility];  Service: Orthopedics       Current Outpatient Medications:     amiodarone 200 mg tablet, Take 0 5 tablets (100 mg total) by mouth daily, Disp: 45 tablet, Rfl: 3    amLODIPine (NORVASC) 2 5 mg tablet, Take 1 tablet (2 5 mg total) by mouth daily, Disp: , Rfl: 0    apixaban (ELIQUIS) 5 mg, Take 1 tablet (5 mg total) by mouth 2 (two) times a day, Disp: 180 tablet, Rfl: 1    atorvastatin (LIPITOR) 20 mg tablet, Take 1 tablet (20 mg total) by mouth daily (Patient taking differently: Take 20 mg by mouth every evening ), Disp: 90 tablet, Rfl: 3    clotrimazole-betamethasone (LOTRISONE) 1-0 05 % cream, Apply topically 2 (two) times a day, Disp: 45 g, Rfl: 1    Coenzyme Q10 (COQ10) 100 MG CAPS, Take 1 capsule by mouth Daily, Disp: , Rfl:     conjugated estrogens (PREMARIN) vaginal cream, Apply a pea size amount of the estrogen cream to the opening of the vagina three nights per week , Disp: , Rfl:     gabapentin (NEURONTIN) 100 mg capsule, Take 100 mg by mouth daily at bedtime , Disp: , Rfl:     Multiple Vitamins-Minerals (OCUVITE EXTRA) TABS, Take 1 tablet by mouth daily, Disp: , Rfl:     Omega-3 1000 MG CAPS, Take by mouth, Disp: , Rfl:     potassium chloride (K-DUR,KLOR-CON) 10 mEq tablet, Take 1 tablet (10 mEq total) by mouth 2 (two) times a day, Disp: 60 tablet, Rfl: 2    torsemide (DEMADEX) 20 mg tablet, Take 1 tablet (20 mg total) by mouth daily, Disp: 30 tablet, Rfl: 6  Allergies   Allergen Reactions    Heparin      Annotation - [id number]: LOW PLATELETS    Phenazopyridine      As per daughter "Not appropriate for pt due to heart condition"       Labs:  Lab Results   Component Value Date     08/04/2015    K 3 7 09/16/2019    K 3 6 08/04/2015     09/16/2019     08/04/2015    CO2 28 09/16/2019    CO2 26 4 08/04/2015    BUN 17 09/16/2019    BUN 19 08/04/2015    CREATININE 1 07 09/16/2019    CREATININE 1 19 08/04/2015    GLUCOSE 92 08/04/2015    CALCIUM 8 8 09/16/2019    CALCIUM 8 6 08/04/2015     Imaging:   ECG today demonstrates atrial fibrillation with nonspecific ST and T-wave changes  Prolonged QT     ECHO (7/2019):  LEFT VENTRICLE:  Systolic function was normal by visual assessment  Ejection fraction was estimated to be 65 %  There were no regional wall motion abnormalities  Wall thickness was mildly increased      LEFT ATRIUM:  The atrium was moderately dilated      RIGHT ATRIUM:  The atrium was moderately dilated      MITRAL VALVE:  There was mild regurgitation      AORTIC VALVE:  A bioprosthesis was present  It exhibited normal function    There was trace regurgitation      TRICUSPID VALVE:  There was mild to moderate regurgitation      COMPARISONS:  Comparison was made with the previous study of 08-Dec-2017  There is no change in mean gradient across aortic valve bioprosthesis  Review of Systems:  Review of Systems   Constitutional: Positive for fatigue  HENT: Negative  Eyes: Negative  Respiratory: Negative  Cardiovascular: Positive for leg swelling  Gastrointestinal: Negative  Musculoskeletal: Negative  Skin: Negative  Allergic/Immunologic: Negative  Hematological: Negative  Psychiatric/Behavioral: Negative  All other systems reviewed and are negative  Vitals:    12/02/19 1107   BP: 150/88   Pulse: 73     Physical Exam:  Physical Exam   Constitutional: She is oriented to person, place, and time  She appears well-developed and well-nourished  HENT:   Head: Normocephalic and atraumatic  Eyes: Pupils are equal, round, and reactive to light  EOM are normal  Right eye exhibits no discharge  Left eye exhibits no discharge  No scleral icterus  Neck: Normal range of motion  Neck supple  No JVD present  No thyromegaly present  Cardiovascular: Normal rate, S1 normal, S2 normal, intact distal pulses and normal pulses  An irregular rhythm present  PMI is not displaced  Exam reveals no gallop and no friction rub  Murmur heard  Crescendo decrescendo systolic murmur is present with a grade of 3/6  Pulmonary/Chest: Effort normal and breath sounds normal  No respiratory distress  She has no wheezes  She has no rales  Abdominal: Soft  Bowel sounds are normal  She exhibits no distension  There is no tenderness  Musculoskeletal: Normal range of motion  She exhibits edema  She exhibits no tenderness or deformity  Right lower leg: She exhibits edema  Left lower leg: She exhibits edema  Neurological: She is alert and oriented to person, place, and time  No cranial nerve deficit  Skin: Skin is warm and dry  No rash noted  Psychiatric: She has a normal mood and affect   Judgment and thought content normal    Nursing note and vitals reviewed  Discussion/Summary:    1  PAF - Malone Bumpers went back into atrial fibrillation in the last few months, but she does not appear to be any different symptomatic leave than the last time I saw her  She is active and can perform to her level that she expects, without limitations  Her heart rates are actually better in atrial fibrillation that when she was in sinus or a junctional rhythm  At this point I recommended to just remain as is, and continue a rate control strategy  I am not going to make any changes to the amiodarone as of yet in case symptoms worsen and we would need to consider a cardioversion  She will remain on Eliquis for stroke prevention  2   Sick sinus syndrome - When in sinus rhythm she is markedly bradycardic at times, and also has an intermittent junctional rhythm  Mostly asymptomatic, but I have informed her and her daughter in the past that at some point she will be in need of a permanent pacemaker especially if her heart rates started drop in atrial fibrillation  Currently these are stable  We will see her back in 3 months for follow-up  3   CAD - Malone Bumpers had prior CABG in 2005  She is without symptoms of angina  No other changes were made to her medical therapy  4   Bioprosthetic aortic valve replacement - Echocardiogram from earlier this year shows a normally functioning aortic valve replacement  She should take antibiotic prophylaxis for any dental procedures  5   HTN - Her blood pressure is for the most part controlled  No changes were made to her therapy for this  We ordered updated blood work  She should periodically check her blood pressure at home  Counseling / Coordination of Care  Total floor / unit time spent today 25 minutes  Greater than 50% of total time was spent with the patient and / or family counseling and / or coordination of care

## 2019-12-03 ENCOUNTER — TELEPHONE (OUTPATIENT)
Dept: FAMILY MEDICINE CLINIC | Facility: HOSPITAL | Age: 84
End: 2019-12-03

## 2019-12-03 ENCOUNTER — APPOINTMENT (OUTPATIENT)
Dept: LAB | Facility: HOSPITAL | Age: 84
End: 2019-12-03
Attending: INTERNAL MEDICINE
Payer: MEDICARE

## 2019-12-03 DIAGNOSIS — R35.0 URINARY FREQUENCY: Primary | ICD-10-CM

## 2019-12-03 LAB
BACTERIA UR QL AUTO: ABNORMAL /HPF
BILIRUB UR QL STRIP: NEGATIVE
CLARITY UR: CLEAR
COLOR UR: YELLOW
GLUCOSE UR STRIP-MCNC: NEGATIVE MG/DL
HGB UR QL STRIP.AUTO: NEGATIVE
KETONES UR STRIP-MCNC: NEGATIVE MG/DL
LEUKOCYTE ESTERASE UR QL STRIP: ABNORMAL
NITRITE UR QL STRIP: NEGATIVE
NON-SQ EPI CELLS URNS QL MICRO: ABNORMAL /HPF
PH UR STRIP.AUTO: 5.5 [PH]
PROT UR STRIP-MCNC: NEGATIVE MG/DL
RBC #/AREA URNS AUTO: ABNORMAL /HPF
SP GR UR STRIP.AUTO: 1.02 (ref 1–1.03)
UROBILINOGEN UR QL STRIP.AUTO: 0.2 E.U./DL
WBC #/AREA URNS AUTO: ABNORMAL /HPF

## 2019-12-03 PROCEDURE — 81001 URINALYSIS AUTO W/SCOPE: CPT

## 2019-12-06 ENCOUNTER — TELEPHONE (OUTPATIENT)
Dept: FAMILY MEDICINE CLINIC | Facility: HOSPITAL | Age: 84
End: 2019-12-06

## 2019-12-06 NOTE — TELEPHONE ENCOUNTER
Patient's daughter calling about UA results  Results are in her chart, please advise what to tell daughter

## 2019-12-06 NOTE — TELEPHONE ENCOUNTER
Labs show only small amount of leukocytes on dipstick and microscopic- Any fever or chills or other symptoms? Not sent for cx as  Does not show significant infection

## 2019-12-26 ENCOUNTER — TELEPHONE (OUTPATIENT)
Dept: FAMILY MEDICINE CLINIC | Facility: HOSPITAL | Age: 84
End: 2019-12-26

## 2019-12-26 DIAGNOSIS — I10 ESSENTIAL HYPERTENSION: ICD-10-CM

## 2019-12-26 RX ORDER — AMLODIPINE BESYLATE 2.5 MG/1
2.5 TABLET ORAL DAILY
Qty: 90 TABLET | Refills: 3 | Status: CANCELLED | OUTPATIENT
Start: 2019-12-26

## 2019-12-26 RX ORDER — AMLODIPINE BESYLATE 2.5 MG/1
TABLET ORAL
Qty: 30 TABLET | Refills: 5 | Status: SHIPPED | OUTPATIENT
Start: 2019-12-26 | End: 2020-03-30 | Stop reason: SDUPTHER

## 2020-01-02 DIAGNOSIS — G62.9 PERIPHERAL POLYNEUROPATHY: Primary | ICD-10-CM

## 2020-01-02 RX ORDER — GABAPENTIN 100 MG/1
100 CAPSULE ORAL
Qty: 30 CAPSULE | Refills: 5 | Status: SHIPPED | OUTPATIENT
Start: 2020-01-02 | End: 2020-02-26 | Stop reason: SDUPTHER

## 2020-01-13 DIAGNOSIS — I48.0 PAF (PAROXYSMAL ATRIAL FIBRILLATION) (HCC): ICD-10-CM

## 2020-01-13 RX ORDER — AMIODARONE HYDROCHLORIDE 200 MG/1
TABLET ORAL
Qty: 60 TABLET | Refills: 0 | Status: SHIPPED | OUTPATIENT
Start: 2020-01-13 | End: 2020-03-25 | Stop reason: HOSPADM

## 2020-02-26 ENCOUNTER — OFFICE VISIT (OUTPATIENT)
Dept: FAMILY MEDICINE CLINIC | Facility: HOSPITAL | Age: 85
End: 2020-02-26
Payer: MEDICARE

## 2020-02-26 VITALS
HEIGHT: 62 IN | HEART RATE: 50 BPM | SYSTOLIC BLOOD PRESSURE: 142 MMHG | DIASTOLIC BLOOD PRESSURE: 78 MMHG | BODY MASS INDEX: 22.82 KG/M2 | WEIGHT: 124 LBS

## 2020-02-26 DIAGNOSIS — B02.29 POST HERPETIC NEURALGIA: Primary | ICD-10-CM

## 2020-02-26 DIAGNOSIS — G62.9 PERIPHERAL POLYNEUROPATHY: ICD-10-CM

## 2020-02-26 DIAGNOSIS — N18.30 CKD (CHRONIC KIDNEY DISEASE) STAGE 3, GFR 30-59 ML/MIN (HCC): ICD-10-CM

## 2020-02-26 DIAGNOSIS — I49.5 SICK SINUS SYNDROME (HCC): ICD-10-CM

## 2020-02-26 DIAGNOSIS — I48.0 PAROXYSMAL ATRIAL FIBRILLATION (HCC): ICD-10-CM

## 2020-02-26 DIAGNOSIS — R60.0 LOCALIZED EDEMA: ICD-10-CM

## 2020-02-26 PROCEDURE — 3008F BODY MASS INDEX DOCD: CPT | Performed by: INTERNAL MEDICINE

## 2020-02-26 PROCEDURE — 3078F DIAST BP <80 MM HG: CPT | Performed by: INTERNAL MEDICINE

## 2020-02-26 PROCEDURE — 3077F SYST BP >= 140 MM HG: CPT | Performed by: INTERNAL MEDICINE

## 2020-02-26 PROCEDURE — 1160F RVW MEDS BY RX/DR IN RCRD: CPT | Performed by: INTERNAL MEDICINE

## 2020-02-26 PROCEDURE — 99213 OFFICE O/P EST LOW 20 MIN: CPT | Performed by: INTERNAL MEDICINE

## 2020-02-26 PROCEDURE — 4040F PNEUMOC VAC/ADMIN/RCVD: CPT | Performed by: INTERNAL MEDICINE

## 2020-02-26 PROCEDURE — 1036F TOBACCO NON-USER: CPT | Performed by: INTERNAL MEDICINE

## 2020-02-26 RX ORDER — GABAPENTIN 100 MG/1
200 CAPSULE ORAL
Qty: 60 CAPSULE | Refills: 5 | Status: SHIPPED | OUTPATIENT
Start: 2020-02-26 | End: 2020-03-30 | Stop reason: SDUPTHER

## 2020-02-26 NOTE — PROGRESS NOTES
Assessment/Plan:             Problem List Items Addressed This Visit        Cardiovascular and Mediastinum    Paroxysmal atrial fibrillation (HCC)     Well controlled rates on amiodarone   has brief episodes of feeling light headed  Last occurred while shopping at [address]  Discussed need for adequate water  Sick sinus syndrome (HCC)       Nervous and Auditory    Peripheral neuropathy    Relevant Medications    gabapentin (NEURONTIN) 100 mg capsule    Post herpetic neuralgia - Primary       Genitourinary    CKD (chronic kidney disease) stage 3, GFR 30-59 ml/min (HCC)       Other    Localized edema            Subjective:      Patient ID: Benedict Reed is a 80 y o  female    1  Leg edema- improving with taking  Diuretic daily      The following portions of the patient's history were reviewed and updated as appropriate: allergies, current medications and problem list      Review of Systems   HENT: Negative for congestion  Hearing loss- has hearing aides   Respiratory: Negative for shortness of breath  Gastrointestinal: Negative for abdominal distention  All other systems reviewed and are negative          Objective:      Current Outpatient Medications:     amiodarone 200 mg tablet, TAKE ONE TABLET BY MOUTH TWICE A DAY FOR 14 DAYS THEN TAKE ONE TABLET BY MOUTH EVERY DAY THEREAFTER, Disp: 60 tablet, Rfl: 0    amLODIPine (NORVASC) 2 5 mg tablet, TAKE ONE TABLET BY MOUTH EVERY DAY, Disp: 30 tablet, Rfl: 5    apixaban (ELIQUIS) 5 mg, Take 1 tablet (5 mg total) by mouth 2 (two) times a day, Disp: 180 tablet, Rfl: 1    atorvastatin (LIPITOR) 20 mg tablet, Take 1 tablet (20 mg total) by mouth daily (Patient taking differently: Take 20 mg by mouth every evening ), Disp: 90 tablet, Rfl: 3    clotrimazole-betamethasone (LOTRISONE) 1-0 05 % cream, Apply topically 2 (two) times a day, Disp: 45 g, Rfl: 1    Coenzyme Q10 (COQ10) 100 MG CAPS, Take 1 capsule by mouth Daily, Disp: , Rfl:     conjugated estrogens (PREMARIN) vaginal cream, Apply a pea size amount of the estrogen cream to the opening of the vagina three nights per week , Disp: , Rfl:     gabapentin (NEURONTIN) 100 mg capsule, Take 2 capsules (200 mg total) by mouth daily at bedtime Increased dose 2/26/20, Disp: 60 capsule, Rfl: 5    Multiple Vitamins-Minerals (OCUVITE EXTRA) TABS, Take 1 tablet by mouth daily, Disp: , Rfl:     Omega-3 1000 MG CAPS, Take by mouth, Disp: , Rfl:     potassium chloride (K-DUR,KLOR-CON) 10 mEq tablet, Take 1 tablet (10 mEq total) by mouth 2 (two) times a day, Disp: 60 tablet, Rfl: 2    torsemide (DEMADEX) 20 mg tablet, Take 1 tablet (20 mg total) by mouth daily, Disp: 30 tablet, Rfl: 6    Blood pressure 142/78, pulse (!) 50, height 5' 2" (1 575 m), weight 56 2 kg (124 lb), not currently breastfeeding  Physical Exam   Constitutional: She appears well-developed and well-nourished  No distress  HENT:   Head: Normocephalic  Right Ear: External ear normal    Neck: No thyromegaly present  Cardiovascular: Normal rate  Murmur heard  Aortic crisp closure   Pulmonary/Chest: Effort normal and breath sounds normal  No respiratory distress  She has no wheezes  Abdominal: Soft  Bowel sounds are normal  She exhibits no distension  There is no guarding  Musculoskeletal: She exhibits edema  She exhibits no tenderness  Bilateral edema   Lymphadenopathy:     She has no cervical adenopathy  Psychiatric: She has a normal mood and affect  Her behavior is normal  Thought content normal    Nursing note and vitals reviewed

## 2020-02-26 NOTE — ASSESSMENT & PLAN NOTE
Well controlled rates on amiodarone   has brief episodes of feeling light headed  Last occurred while shopping at [address]  Discussed need for adequate water

## 2020-03-12 DIAGNOSIS — I48.0 PAF (PAROXYSMAL ATRIAL FIBRILLATION) (HCC): ICD-10-CM

## 2020-03-12 RX ORDER — APIXABAN 5 MG/1
TABLET, FILM COATED ORAL
Qty: 180 TABLET | Refills: 1 | Status: SHIPPED | OUTPATIENT
Start: 2020-03-12 | End: 2020-03-30 | Stop reason: SDUPTHER

## 2020-03-14 ENCOUNTER — APPOINTMENT (EMERGENCY)
Dept: CT IMAGING | Facility: HOSPITAL | Age: 85
End: 2020-03-14
Payer: MEDICARE

## 2020-03-14 ENCOUNTER — HOSPITAL ENCOUNTER (EMERGENCY)
Facility: HOSPITAL | Age: 85
Discharge: HOME | End: 2020-03-14
Attending: EMERGENCY MEDICINE | Admitting: EMERGENCY MEDICINE
Payer: MEDICARE

## 2020-03-14 ENCOUNTER — APPOINTMENT (EMERGENCY)
Dept: RADIOLOGY | Facility: HOSPITAL | Age: 85
End: 2020-03-14
Payer: MEDICARE

## 2020-03-14 VITALS
OXYGEN SATURATION: 100 % | BODY MASS INDEX: 22.82 KG/M2 | DIASTOLIC BLOOD PRESSURE: 81 MMHG | HEIGHT: 62 IN | HEART RATE: 51 BPM | TEMPERATURE: 97.8 F | WEIGHT: 124 LBS | RESPIRATION RATE: 16 BRPM | SYSTOLIC BLOOD PRESSURE: 193 MMHG

## 2020-03-14 DIAGNOSIS — S09.90XA HEAD INJURY: Primary | ICD-10-CM

## 2020-03-14 DIAGNOSIS — T14.8XXA MULTIPLE SKIN TEARS: ICD-10-CM

## 2020-03-14 DIAGNOSIS — T07.XXXA MULTIPLE ABRASIONS: ICD-10-CM

## 2020-03-14 PROCEDURE — 72125 CT NECK SPINE W/O DYE: CPT

## 2020-03-14 PROCEDURE — 90471 IMMUNIZATION ADMIN: CPT

## 2020-03-14 PROCEDURE — 70450 CT HEAD/BRAIN W/O DYE: CPT

## 2020-03-14 PROCEDURE — 73564 X-RAY EXAM KNEE 4 OR MORE: CPT

## 2020-03-14 PROCEDURE — 70486 CT MAXILLOFACIAL W/O DYE: CPT

## 2020-03-14 PROCEDURE — 99284 EMERGENCY DEPT VISIT MOD MDM: CPT | Performed by: EMERGENCY MEDICINE

## 2020-03-14 PROCEDURE — 90715 TDAP VACCINE 7 YRS/> IM: CPT | Performed by: EMERGENCY MEDICINE

## 2020-03-14 PROCEDURE — 99284 EMERGENCY DEPT VISIT MOD MDM: CPT

## 2020-03-14 RX ORDER — BACITRACIN, NEOMYCIN, POLYMYXIN B 400; 3.5; 5 [USP'U]/G; MG/G; [USP'U]/G
1 OINTMENT TOPICAL ONCE
Status: COMPLETED | OUTPATIENT
Start: 2020-03-14 | End: 2020-03-14

## 2020-03-14 RX ORDER — ACETAMINOPHEN 325 MG/1
650 TABLET ORAL ONCE
Status: COMPLETED | OUTPATIENT
Start: 2020-03-14 | End: 2020-03-14

## 2020-03-14 RX ADMIN — BACITRACIN ZINC, NEOMYCIN SULFATE, AND POLYMYXIN B SULFATE 1 LARGE APPLICATION: 400; 3.5; 5 OINTMENT TOPICAL at 18:00

## 2020-03-14 RX ADMIN — ACETAMINOPHEN 650 MG: 325 TABLET ORAL at 16:35

## 2020-03-14 RX ADMIN — TETANUS TOXOID, REDUCED DIPHTHERIA TOXOID AND ACELLULAR PERTUSSIS VACCINE, ADSORBED 0.5 ML: 5; 2.5; 8; 8; 2.5 SUSPENSION INTRAMUSCULAR at 16:34

## 2020-03-14 NOTE — TRAUMA DOCUMENTATION
Patient placed on bedpan  Double Island Pedicle Flap Text: The defect edges were debeveled with a #15 scalpel blade.  Given the location of the defect, shape of the defect and the proximity to free margins a double island pedicle advancement flap was deemed most appropriate.  Using a sterile surgical marker, an appropriate advancement flap was drawn incorporating the defect, outlining the appropriate donor tissue and placing the expected incisions within the relaxed skin tension lines where possible.    The area thus outlined was incised deep to adipose tissue with a #15 scalpel blade.  The skin margins were undermined to an appropriate distance in all directions around the primary defect and laterally outward around the island pedicle utilizing iris scissors.  There was minimal undermining beneath the pedicle flap.

## 2020-03-14 NOTE — TRAUMA DOCUMENTATION
Provider at bedside disusing results with patient and daughter and bedside  Wounds cleansed, dressed with bacitracin per order and telfa  And wrapped with gauze  Patient tolerated well

## 2020-03-14 NOTE — ED PROVIDER NOTES
H&P Exam - Trauma   [facility] 80 [facility]: [id number]  Unit/Bed#: ED 08/ED 08 Encounter: [id number]    Assessment/Plan   Trauma Alert: Trauma Acuity: Trauma Evaluation  Model of Arrival: Mode of Arrival: Other (Comment)(private car) via    Trauma Team: Current Providers  Attending Provider: Lesley Street DO  Registered Nurse: Danial Riedel, RN  Consultants: None      Trauma Active Problems:  Facial and bilateral knee injuries patient on anticoagulation    Trauma Plan:  Trauma evaluation called    Chief Complaint:   Chief Complaint   Patient presents with    Fall     pt presents to ED states she tripped and fell and fell so fast she couldn't get her arms out to stop it, c/o abrasions to her face, and bilateral knee pain  rates pain 10/10       History of Present Illness   HPI:  Richard Wilson is a 80 y o  female who presents with multiple facial contusions and abrasions in addition to bilateral skin tears of the knees  No loss of consciousness no significant neck or back pain no chest or abdominal pain  Mechanism:Details of Incident: pt slipped and tripped and fell without being able to put her hands down  Hit her face and bilateral knees          This is a 72-year-old female who tripped and fell on a sidewalk she has multiple facial contusions and abrasions bilateral knee skin tears 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      History provided by:  Patient and relative  Medical Problem   Location:  Facial abrasions bilateral knee skin tears  Quality:  Achy pain  Severity:  Moderate  Onset quality:  Sudden  Timing:  Constant  Progression:  Unchanged  Chronicity:  New  Context:  Trip and fall with multiple abrasions and skin tears  Associated symptoms: no abdominal pain and no chest pain      Review of Systems   Cardiovascular: Negative for chest pain  Gastrointestinal: Negative for abdominal pain  Skin: Positive for wound  All other systems reviewed and are negative        Historical Information     Immunizations:   Immunization History   Administered Date(s) Administered    Hep A, adult 01/30/1998, 10/01/1998    INFLUENZA 10/06/2015, 11/14/2016, 11/02/2017    Influenza Quadrivalent, 6-35 Months IM 11/02/2017    Influenza Split High Dose Preservative Free IM 10/06/2015, 11/14/2016    Influenza TIV (IM) 08/27/2013    Influenza, high dose seasonal 0 5 mL 10/02/2018    Pneumococcal Conjugate 13-Valent 10/02/2018    Pneumococcal Polysaccharide PPV23 08/15/2006    Td (adult), adsorbed 06/01/2009    Tdap 03/14/2020    Zoster 05/01/2013    Zoster Vaccine Recombinant 03/02/2019, 06/27/2019       Past Medical History:   Diagnosis Date    [facility] infarction [facility]     Atrial fibrillation ([facility] )     CAD (coronary artery disease)     Cancer ([facility] )     skin cancer removed from left leg    Cellulitis      AND ABSCESS    Cholecystitis     Closed Colles' fracture     OF THE LEFT WRIST; LAST ASSESSED: 5/2/14    Closed fracture of radius     LAST ASSESSED: 2/19/14    Dyspnea     Hair loss disorder     Hematuria     History of being hospitalized     [facility] PROCEDURES: 1) REDO STERNOTOMY AND AORTIC VALVE REPLACEMENT WITH A 19-MM REBA-PETERS BOVINIE PERICARDIAL MAGNA EASE VALVE 2) TRANSESOPHAGEAL ECHOCARDIOGRAM     Hypercholesterolemia     Hypertension     Incomplete bladder emptying     Leg wound, left     Malaise and fatigue     Sick sinus syndrome ([facility] )     Urinary tract infection        Family History   Problem Relation Age of Onset    Diabetes Mother         MELLITUS    Hypertension Mother     Heart disease Mother     Prostate cancer Father     Dementia Brother     Heart disease Maternal Grandmother     Hypertension Maternal Grandmother     Heart disease Maternal Grandfather     Hypertension Family     Osteoporosis Family     Substance Abuse Neg Hx     Mental illness Neg Hx      Past Surgical History:   Procedure Laterality Date    AORTIC VALVE REPLACEMENT  01/2012    HEART VALVE    CARDIAC SURGERY      CATARACT EXTRACTION W/  INTRAOCULAR LENS IMPLANT  08/26/2010    Maurisio Arreola MD; PHACOEMULISIFICATION; INSERTION INTRAOCULAR LENS OD    CORONARY ARTERY BYPASS GRAFT  01/2005    LEG SURGERY      NJ KNEE SCOPE,MED/LAT MENISECTOMY Left 9/23/2019    Procedure: ARTHROSCOPY KNEE, LEFT PARTIAL MEDIAL MENISCECTOMY;  Surgeon: Ambrocio Lopez MD;  Location: [facility] OR;  Service: Orthopedics     Social History     Socioeconomic History    Marital status:      Spouse name: None    Number of children: None    Years of education: None    Highest education level: None   Occupational History    Occupation: RETIRED   Social Needs    Financial resource strain: None    Food insecurity:     Worry: None     Inability: None    Transportation needs:     Medical: No     Non-medical: No   Tobacco Use    Smoking status: Former Smoker    Smokeless tobacco: Never Used    Tobacco comment: QUIT 40 YEARS AGO ALSO NEVER A SMOKER AS PER [employer]   Substance and Sexual Activity    Alcohol use: Never     Frequency: Never     Comment: social    Drug use: No    Sexual activity: Not Currently   Lifestyle    Physical activity:     Days per week: None     Minutes per session: None    Stress: None   Relationships    Social connections:     Talks on phone: None     Gets together: None     Attends Cheondoism service: None     Active member of club or organization: None     Attends meetings of clubs or organizations: None     Relationship status: None    Intimate partner violence:     Fear of current or ex partner: None     Emotionally abused: None     Physically abused: None     Forced sexual activity: None   Other Topics Concern    None   Social History Narrative    Lives with son  Feels safe  Has living will      ACTIVE ADVANCE DIRECTIVE    CAFFEINE USE: 1 1/2 CUPS COFFEE QD    EXERCISE HABITS    GOOD DENTAL HYGIENE    LIVING SITUATION: SUPPORTIVE AND SAFE     E-Cigarette/Vaping    E-Cigarette Use Never User      E-Cigarette/Vaping Substances       Family History: non-contributory    Meds/Allergies   Prior to Admission Medications   Prescriptions Last Dose Informant Patient Reported? Taking?    Coenzyme Q10 (COQ10) 100 MG CAPS  Self Yes No   Sig: Take 1 capsule by mouth Daily   ELIQUIS 5 MG   No No   Sig: TAKE ONE TABLET TWICE DAILY   Multiple Vitamins-Minerals (OCUVITE EXTRA) TABS  Self Yes No   Sig: Take 1 tablet by mouth daily   Omega-3 1000 MG CAPS  Self Yes No   Sig: Take by mouth   amLODIPine (NORVASC) 2 5 mg tablet   No No   Sig: TAKE ONE TABLET BY MOUTH EVERY DAY   amiodarone 200 mg tablet   No No   Sig: TAKE ONE TABLET BY MOUTH TWICE A DAY FOR 14 DAYS THEN TAKE ONE TABLET BY MOUTH EVERY DAY THEREAFTER   atorvastatin (LIPITOR) 20 mg tablet  Self No No   Sig: Take 1 tablet (20 mg total) by mouth daily   Patient taking differently: Take 20 mg by mouth every evening    clotrimazole-betamethasone (LOTRISONE) 1-0 05 % cream  Self No No   Sig: Apply topically 2 (two) times a day   conjugated estrogens (PREMARIN) vaginal cream  Self Yes No   Sig: Apply a pea size amount of the estrogen cream to the opening of the vagina three nights per week    gabapentin (NEURONTIN) 100 mg capsule   No No   Sig: Take 2 capsules (200 mg total) by mouth daily at bedtime Increased dose 2/26/20   potassium chloride (K-DUR,KLOR-CON) 10 mEq tablet  Self No No   Sig: Take 1 tablet (10 mEq total) by mouth 2 (two) times a day   torsemide (DEMADEX) 20 mg tablet  Self No No   Sig: Take 1 tablet (20 mg total) by mouth daily      Facility-Administered Medications: None       Allergies   Allergen Reactions    Heparin      Annotation - [id number]: LOW PLATELETS    Phenazopyridine      As per daughter "Not appropriate for pt due to heart condition"       PHYSICAL EXAM    PE limited by:  Age    Objective   Vitals:   First set: Temperature: 98 5 °F (36 9 °C) (03/14/20 1609)  Pulse: 61 (03/14/20 1611)  Respirations: 16 (03/14/20 1611)  Blood Pressure: (!) 219/98 (03/14/20 1614)  SpO2: 98 % (03/14/20 1611)    Primary Survey:   (A) Airway:  Patent  (B) Breathing:  Clear bilateral  (C) Circulation: Pulses:   normal  (D) Disabliity:  GCS Total:  15  (E) Expose:  Completed    Secondary Survey: (Click on Physical Exam tab above)  Physical Exam   Constitutional: She is oriented to person, place, and time  She appears well-developed  No distress  HENT:   Head: Normocephalic  Right Ear: External ear normal    Left Ear: External ear normal    Mouth/Throat: Oropharynx is clear and moist    Eyes: Pupils are equal, round, and reactive to light  EOM are normal  Right eye exhibits no discharge  Left eye exhibits no discharge  No scleral icterus  Neck: Neck supple  No JVD present  No tracheal deviation present  No midline cervical thoracic or lumbar spinal tenderness   Cardiovascular: Regular rhythm and intact distal pulses  Exam reveals no gallop and no friction rub  No murmur heard  Slightly bradycardic   Pulmonary/Chest: Effort normal and breath sounds normal  No stridor  No respiratory distress  She has no wheezes  Abdominal: Soft  Bowel sounds are normal  She exhibits no distension  There is no tenderness  There is no guarding  Musculoskeletal: Normal range of motion  She exhibits edema ( mild bilateral) and tenderness ( bilateral anterior knees)  Deformity: Arthritic  Neurological: She is alert and oriented to person, place, and time  No cranial nerve deficit or sensory deficit  She exhibits normal muscle tone  Coordination normal    Skin: Skin is warm  She is not diaphoretic  Forehead and facial abrasions and contusions bilateral skin tears to the knees   Psychiatric: She has a normal mood and affect  Her behavior is normal  Thought content normal    Nursing note and vitals reviewed        Invasive Devices     None                 Lab Results:   Results Reviewed     None                 Imaging Studies:   Direct to CT: Yes  XR knee 4+ views left injury   ED Interpretation by Michaela Bone DO (03/14 1717)   No acute fracture or dislocation      XR knee 4+ views Right injury   ED Interpretation by Michaela Bone DO (03/14 1717)   No acute fracture or dislocation      TRAUMA - CT head wo contrast   Final Result by Uriel Zayas DO (03/14 1719)      No acute intracranial abnormality  Frontal scalp hematoma without calvarial fracture  Workstation performed: [id number]         TRAUMA - CT spine cervical wo contrast   Final Result by Uriel Zayas DO (03/14 1739)      No cervical spine fracture or traumatic malalignment  Degenerative changes as above  Workstation performed: [id number]         TRAUMA - CT facial bones wo contrast   Final Result by Uriel Zayas DO (03/14 1727)      No facial bone fracture detected  Frontal scalp hematoma  Workstation performed: [id number]             Other Studies:  Not indicated    Code Status: No Order  Advance Directive and Living Will:      Power of :    POLST:      Procedures  Procedures         ED Course         MDM      Disposition  [facility] Transfer: No  Final diagnoses:   Head injury   Multiple abrasions - Face   Multiple skin tears - Bilateral knees     Time reflects when diagnosis was documented in both MDM as applicable and the Disposition within this note     Time User Action Codes Description Comment    3/14/2020  5:59 PM Logan Coronel Add [L31 [id number]] Head injury     3/14/2020  5:59 PM Joellen Barraza  XXXA] Multiple abrasions     3/14/2020  5:59 PM Arlina Child  XXXA] Multiple abrasions Face    3/14/2020  6:00 PM Joellen Barraza  8XXA] Multiple skin tears     3/14/2020  6:00 PM Arlina Child  8XXA] Multiple skin tears Bilateral knees      ED Disposition     ED Disposition Condition Date/Time Comment    Discharge Stable Sat Mar 14, 2020  5:59 PM [place] Sitter discharge to home/self care  Follow-up Information     Follow up With Specialties Details Why Sohail Bass 89, DO Internal Medicine In [facility]  [address]          Patient's Medications   Discharge Prescriptions    No medications on file     No discharge procedures on file      PDMP Review     None          ED Provider  Electronically Signed by         Jim Sierra DO  03/14/20 5739

## 2020-03-17 ENCOUNTER — APPOINTMENT (OUTPATIENT)
Dept: WOUND CARE | Facility: HOSPITAL | Age: 85
DRG: 844 | End: 2020-03-17
Payer: MEDICARE

## 2020-03-17 PROCEDURE — 11042 DBRDMT SUBQ TIS 1ST 20SQCM/<: CPT

## 2020-03-17 PROCEDURE — 99212 OFFICE O/P EST SF 10 MIN: CPT

## 2020-03-17 PROCEDURE — 11045 DBRDMT SUBQ TISS EACH ADDL: CPT

## 2020-03-20 ENCOUNTER — APPOINTMENT (EMERGENCY)
Dept: CT IMAGING | Facility: HOSPITAL | Age: 85
DRG: 844 | End: 2020-03-20
Payer: MEDICARE

## 2020-03-20 ENCOUNTER — APPOINTMENT (INPATIENT)
Dept: CT IMAGING | Facility: HOSPITAL | Age: 85
DRG: 844 | End: 2020-03-20
Payer: MEDICARE

## 2020-03-20 ENCOUNTER — HOSPITAL ENCOUNTER (INPATIENT)
Facility: HOSPITAL | Age: 85
LOS: 5 days | Discharge: HOME | DRG: 844 | End: 2020-03-25
Attending: EMERGENCY MEDICINE | Admitting: INTERNAL MEDICINE
Payer: MEDICARE

## 2020-03-20 ENCOUNTER — APPOINTMENT (INPATIENT)
Dept: MRI IMAGING | Facility: HOSPITAL | Age: 85
DRG: 844 | End: 2020-03-20
Payer: MEDICARE

## 2020-03-20 DIAGNOSIS — R93.5 ABNORMAL CT OF THE ABDOMEN: Primary | ICD-10-CM

## 2020-03-20 DIAGNOSIS — I48.0 PAROXYSMAL ATRIAL FIBRILLATION (HCC): Chronic | ICD-10-CM

## 2020-03-20 DIAGNOSIS — I49.8 JUNCTIONAL RHYTHM: ICD-10-CM

## 2020-03-20 DIAGNOSIS — K56.600: ICD-10-CM

## 2020-03-20 DIAGNOSIS — I10 HYPERTENSION: ICD-10-CM

## 2020-03-20 DIAGNOSIS — K76.9 HEPATIC LESION: ICD-10-CM

## 2020-03-20 DIAGNOSIS — R10.9 ABDOMINAL PAIN: ICD-10-CM

## 2020-03-20 DIAGNOSIS — K56.609 LARGE BOWEL OBSTRUCTION (HCC): ICD-10-CM

## 2020-03-20 PROBLEM — Z95.1 HX OF CABG: Chronic | Status: ACTIVE | Noted: 2017-01-18

## 2020-03-20 PROBLEM — R33.9 INCOMPLETE BLADDER EMPTYING: Status: RESOLVED | Noted: 2019-10-08 | Resolved: 2020-03-20

## 2020-03-20 PROBLEM — I50.32 CHRONIC DIASTOLIC CONGESTIVE HEART FAILURE (HCC): Status: ACTIVE | Noted: 2020-03-20

## 2020-03-20 PROBLEM — I49.5 SICK SINUS SYNDROME (HCC): Chronic | Status: ACTIVE | Noted: 2019-12-02

## 2020-03-20 PROBLEM — Z95.2 HISTORY OF AORTIC VALVE REPLACEMENT: Chronic | Status: ACTIVE | Noted: 2018-02-01

## 2020-03-20 PROBLEM — Z47.89 AFTERCARE FOLLOWING SURGERY OF THE MUSCULOSKELETAL SYSTEM: Status: RESOLVED | Noted: 2019-10-04 | Resolved: 2020-03-20
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
## 2020-03-20 PROBLEM — R60.0 LOCALIZED EDEMA: Status: RESOLVED | Noted: 2019-10-29 | Resolved: 2020-03-20

## 2020-03-20 PROBLEM — N18.30 CKD (CHRONIC KIDNEY DISEASE) STAGE 3, GFR 30-59 ML/MIN (HCC): Chronic | Status: ACTIVE | Noted: 2019-09-16

## 2020-03-20 LAB
AFP-TM SERPL-MCNC: 9.5 NG/ML (ref 0.5–8)
ALBUMIN SERPL BCP-MCNC: 3.9 G/DL (ref 3.5–5)
ALP SERPL-CCNC: 133 U/L (ref 46–116)
ALT SERPL W P-5'-P-CCNC: 22 U/L (ref 12–78)
ANION GAP SERPL CALCULATED.3IONS-SCNC: 8 MMOL/L (ref 4–13)
ANION GAP SERPL CALCULATED.3IONS-SCNC: 8 MMOL/L (ref 4–13)
AST SERPL W P-5'-P-CCNC: 21 U/L (ref 5–45)
ATRIAL RATE: 44 BPM
BASOPHILS # BLD AUTO: 0.02 THOUSANDS/ΜL (ref 0–0.1)
BASOPHILS # BLD AUTO: 0.02 THOUSANDS/ΜL (ref 0–0.1)
BASOPHILS NFR BLD AUTO: 0 % (ref 0–1)
BASOPHILS NFR BLD AUTO: 0 % (ref 0–1)
BILIRUB SERPL-MCNC: 0.4 MG/DL (ref 0.2–1)
BUN SERPL-MCNC: 16 MG/DL (ref 5–25)
BUN SERPL-MCNC: 24 MG/DL (ref 5–25)
CALCIUM SERPL-MCNC: 8.7 MG/DL (ref 8.3–10.1)
CALCIUM SERPL-MCNC: 8.9 MG/DL (ref 8.3–10.1)
CEA SERPL-MCNC: 6.9 NG/ML (ref 0–3)
CHLORIDE SERPL-SCNC: 101 MMOL/L (ref 100–108)
CHLORIDE SERPL-SCNC: 102 MMOL/L (ref 100–108)
CO2 SERPL-SCNC: 29 MMOL/L (ref 21–32)
CO2 SERPL-SCNC: 31 MMOL/L (ref 21–32)
CREAT SERPL-MCNC: 1.1 MG/DL (ref 0.6–1.3)
CREAT SERPL-MCNC: 1.3 MG/DL (ref 0.6–1.3)
EOSINOPHIL # BLD AUTO: 0.04 THOUSAND/ΜL (ref 0–0.61)
EOSINOPHIL # BLD AUTO: 0.1 THOUSAND/ΜL (ref 0–0.61)
EOSINOPHIL NFR BLD AUTO: 0 % (ref 0–6)
EOSINOPHIL NFR BLD AUTO: 1 % (ref 0–6)
ERYTHROCYTE [DISTWIDTH] IN BLOOD BY AUTOMATED COUNT: 16.4 % (ref 11.6–15.1)
ERYTHROCYTE [DISTWIDTH] IN BLOOD BY AUTOMATED COUNT: 16.7 % (ref 11.6–15.1)
GFR SERPL CREATININE-BSD FRML MDRD: 36 ML/MIN/1.73SQ M
GFR SERPL CREATININE-BSD FRML MDRD: 44 ML/MIN/1.73SQ M
GLUCOSE SERPL-MCNC: 94 MG/DL (ref 65–140)
GLUCOSE SERPL-MCNC: 98 MG/DL (ref 65–140)
HCT VFR BLD AUTO: 34.2 % (ref 34.8–46.1)
HCT VFR BLD AUTO: 34.8 % (ref 34.8–46.1)
HGB BLD-MCNC: 10.6 G/DL (ref 11.5–15.4)
HGB BLD-MCNC: 10.9 G/DL (ref 11.5–15.4)
IMM GRANULOCYTES # BLD AUTO: 0.02 THOUSAND/UL (ref 0–0.2)
IMM GRANULOCYTES # BLD AUTO: 0.03 THOUSAND/UL (ref 0–0.2)
IMM GRANULOCYTES NFR BLD AUTO: 0 % (ref 0–2)
IMM GRANULOCYTES NFR BLD AUTO: 0 % (ref 0–2)
LACTATE SERPL-SCNC: 1.3 MMOL/L (ref 0.5–2)
LIPASE SERPL-CCNC: 143 U/L (ref 73–393)
LYMPHOCYTES # BLD AUTO: 1.9 THOUSANDS/ΜL (ref 0.6–4.47)
LYMPHOCYTES # BLD AUTO: 2.54 THOUSANDS/ΜL (ref 0.6–4.47)
LYMPHOCYTES NFR BLD AUTO: 20 % (ref 14–44)
LYMPHOCYTES NFR BLD AUTO: 27 % (ref 14–44)
MCH RBC QN AUTO: 27.5 PG (ref 26.8–34.3)
MCH RBC QN AUTO: 27.5 PG (ref 26.8–34.3)
MCHC RBC AUTO-ENTMCNC: 31 G/DL (ref 31.4–37.4)
MCHC RBC AUTO-ENTMCNC: 31.3 G/DL (ref 31.4–37.4)
MCV RBC AUTO: 88 FL (ref 82–98)
MCV RBC AUTO: 89 FL (ref 82–98)
MONOCYTES # BLD AUTO: 1 THOUSAND/ΜL (ref 0.17–1.22)
MONOCYTES # BLD AUTO: 1.18 THOUSAND/ΜL (ref 0.17–1.22)
MONOCYTES NFR BLD AUTO: 10 % (ref 4–12)
MONOCYTES NFR BLD AUTO: 13 % (ref 4–12)
NEUTROPHILS # BLD AUTO: 5.45 THOUSANDS/ΜL (ref 1.85–7.62)
NEUTROPHILS # BLD AUTO: 6.75 THOUSANDS/ΜL (ref 1.85–7.62)
NEUTS SEG NFR BLD AUTO: 59 % (ref 43–75)
NEUTS SEG NFR BLD AUTO: 70 % (ref 43–75)
NRBC BLD AUTO-RTO: 0 /100 WBCS
PLATELET # BLD AUTO: 167 THOUSANDS/UL (ref 149–390)
PLATELET # BLD AUTO: 178 THOUSANDS/UL (ref 149–390)
PMV BLD AUTO: 10.9 FL (ref 8.9–12.7)
PMV BLD AUTO: 11.6 FL (ref 8.9–12.7)
POTASSIUM SERPL-SCNC: 3.5 MMOL/L (ref 3.5–5.3)
POTASSIUM SERPL-SCNC: 3.5 MMOL/L (ref 3.5–5.3)
PROT SERPL-MCNC: 7.7 G/DL (ref 6.4–8.2)
QRS AXIS: -3 DEGREES
QRSD INTERVAL: 86 MS
QT INTERVAL: 450 MS
QTC INTERVAL: 393 MS
RBC # BLD AUTO: 3.85 MILLION/UL (ref 3.81–5.12)
RBC # BLD AUTO: 3.97 MILLION/UL (ref 3.81–5.12)
SODIUM SERPL-SCNC: 139 MMOL/L (ref 136–145)
SODIUM SERPL-SCNC: 140 MMOL/L (ref 136–145)
T WAVE AXIS: 91 DEGREES
VENTRICULAR RATE: 46 BPM
WBC # BLD AUTO: 9.31 THOUSAND/UL (ref 4.31–10.16)
WBC # BLD AUTO: 9.74 THOUSAND/UL (ref 4.31–10.16)

## 2020-03-20 PROCEDURE — 85025 COMPLETE CBC W/AUTO DIFF WBC: CPT | Performed by: NURSE PRACTITIONER

## 2020-03-20 PROCEDURE — 99285 EMERGENCY DEPT VISIT HI MDM: CPT

## 2020-03-20 PROCEDURE — 93010 ELECTROCARDIOGRAM REPORT: CPT | Performed by: INTERNAL MEDICINE

## 2020-03-20 PROCEDURE — 74176 CT ABD & PELVIS W/O CONTRAST: CPT

## 2020-03-20 PROCEDURE — 71250 CT THORAX DX C-: CPT

## 2020-03-20 PROCEDURE — 83605 ASSAY OF LACTIC ACID: CPT | Performed by: EMERGENCY MEDICINE

## 2020-03-20 PROCEDURE — 74177 CT ABD & PELVIS W/CONTRAST: CPT

## 2020-03-20 PROCEDURE — 74183 MRI ABD W/O CNTR FLWD CNTR: CPT

## 2020-03-20 PROCEDURE — 83690 ASSAY OF LIPASE: CPT | Performed by: EMERGENCY MEDICINE

## 2020-03-20 PROCEDURE — 96360 HYDRATION IV INFUSION INIT: CPT

## 2020-03-20 PROCEDURE — 80053 COMPREHEN METABOLIC PANEL: CPT | Performed by: EMERGENCY MEDICINE

## 2020-03-20 PROCEDURE — 99285 EMERGENCY DEPT VISIT HI MDM: CPT | Performed by: EMERGENCY MEDICINE

## 2020-03-20 PROCEDURE — NC001 PR NO CHARGE: Performed by: INTERNAL MEDICINE

## 2020-03-20 PROCEDURE — 93005 ELECTROCARDIOGRAM TRACING: CPT

## 2020-03-20 PROCEDURE — 80048 BASIC METABOLIC PNL TOTAL CA: CPT | Performed by: NURSE PRACTITIONER

## 2020-03-20 PROCEDURE — 99223 1ST HOSP IP/OBS HIGH 75: CPT | Performed by: INTERNAL MEDICINE

## 2020-03-20 PROCEDURE — 36415 COLL VENOUS BLD VENIPUNCTURE: CPT | Performed by: EMERGENCY MEDICINE

## 2020-03-20 PROCEDURE — A9585 GADOBUTROL INJECTION: HCPCS | Performed by: HOSPITALIST

## 2020-03-20 PROCEDURE — 99222 1ST HOSP IP/OBS MODERATE 55: CPT | Performed by: SURGERY

## 2020-03-20 PROCEDURE — 82378 CARCINOEMBRYONIC ANTIGEN: CPT | Performed by: INTERNAL MEDICINE

## 2020-03-20 PROCEDURE — 82105 ALPHA-FETOPROTEIN SERUM: CPT | Performed by: NURSE PRACTITIONER

## 2020-03-20 PROCEDURE — 85025 COMPLETE CBC W/AUTO DIFF WBC: CPT | Performed by: EMERGENCY MEDICINE

## 2020-03-20 RX ORDER — LIDOCAINE 50 MG/G
1 PATCH TOPICAL DAILY
Status: DISCONTINUED | OUTPATIENT
Start: 2020-03-20 | End: 2020-03-22

## 2020-03-20 RX ORDER — HYDRALAZINE HYDROCHLORIDE 20 MG/ML
10 INJECTION INTRAMUSCULAR; INTRAVENOUS EVERY 6 HOURS PRN
Status: DISCONTINUED | OUTPATIENT
Start: 2020-03-20 | End: 2020-03-24

## 2020-03-20 RX ORDER — ACETAMINOPHEN 325 MG/1
650 TABLET ORAL EVERY 6 HOURS PRN
Status: DISCONTINUED | OUTPATIENT
Start: 2020-03-20 | End: 2020-03-22

## 2020-03-20 RX ORDER — SODIUM CHLORIDE, SODIUM GLUCONATE, SODIUM ACETATE, POTASSIUM CHLORIDE, MAGNESIUM CHLORIDE, SODIUM PHOSPHATE, DIBASIC, AND POTASSIUM PHOSPHATE .53; .5; .37; .037; .03; .012; .00082 G/100ML; G/100ML; G/100ML; G/100ML; G/100ML; G/100ML; G/100ML
75 INJECTION, SOLUTION INTRAVENOUS CONTINUOUS
Status: DISCONTINUED | OUTPATIENT
Start: 2020-03-20 | End: 2020-03-20

## 2020-03-20 RX ORDER — AMLODIPINE BESYLATE 5 MG/1
5 TABLET ORAL DAILY
Status: DISCONTINUED | OUTPATIENT
Start: 2020-03-20 | End: 2020-03-20

## 2020-03-20 RX ORDER — POTASSIUM CHLORIDE 14.9 MG/ML
20 INJECTION INTRAVENOUS ONCE
Status: COMPLETED | OUTPATIENT
Start: 2020-03-20 | End: 2020-03-20

## 2020-03-20 RX ORDER — AMLODIPINE BESYLATE 5 MG/1
10 TABLET ORAL DAILY
Status: DISCONTINUED | OUTPATIENT
Start: 2020-03-20 | End: 2020-03-25 | Stop reason: HOSPADM

## 2020-03-20 RX ORDER — HYDROMORPHONE HCL/PF 1 MG/ML
0.2 SYRINGE (ML) INJECTION EVERY 4 HOURS PRN
Status: DISCONTINUED | OUTPATIENT
Start: 2020-03-20 | End: 2020-03-22

## 2020-03-20 RX ORDER — POLYETHYLENE GLYCOL 3350 17 G/17G
17 POWDER, FOR SOLUTION ORAL DAILY
Status: DISCONTINUED | OUTPATIENT
Start: 2020-03-21 | End: 2020-03-24

## 2020-03-20 RX ORDER — BISACODYL 10 MG
10 SUPPOSITORY, RECTAL RECTAL DAILY PRN
Status: DISCONTINUED | OUTPATIENT
Start: 2020-03-20 | End: 2020-03-24

## 2020-03-20 RX ORDER — AMOXICILLIN 250 MG
1 CAPSULE ORAL
Status: DISCONTINUED | OUTPATIENT
Start: 2020-03-20 | End: 2020-03-25 | Stop reason: HOSPADM

## 2020-03-20 RX ADMIN — LIDOCAINE 1 PATCH: 50 PATCH CUTANEOUS at 14:29

## 2020-03-20 RX ADMIN — IOHEXOL 100 ML: 350 INJECTION, SOLUTION INTRAVENOUS at 01:57

## 2020-03-20 RX ADMIN — SODIUM CHLORIDE 500 ML: 0.9 INJECTION, SOLUTION INTRAVENOUS at 02:10

## 2020-03-20 RX ADMIN — POTASSIUM CHLORIDE 20 MEQ: 14.9 INJECTION, SOLUTION INTRAVENOUS at 06:20

## 2020-03-20 RX ADMIN — GADOBUTROL 5 ML: 604.72 INJECTION INTRAVENOUS at 11:51

## 2020-03-20 RX ADMIN — HYDROMORPHONE HYDROCHLORIDE 0.2 MG: 1 INJECTION, SOLUTION INTRAMUSCULAR; INTRAVENOUS; SUBCUTANEOUS at 18:59

## 2020-03-20 RX ADMIN — SODIUM CHLORIDE, SODIUM GLUCONATE, SODIUM ACETATE, POTASSIUM CHLORIDE, MAGNESIUM CHLORIDE, SODIUM PHOSPHATE, DIBASIC, AND POTASSIUM PHOSPHATE 75 ML/HR: .53; .5; .37; .037; .03; .012; .00082 INJECTION, SOLUTION INTRAVENOUS at 06:18

## 2020-03-20 RX ADMIN — SENNOSIDES AND DOCUSATE SODIUM 1 TABLET: 8.6; 5 TABLET ORAL at 21:15

## 2020-03-20 RX ADMIN — HYDROMORPHONE HYDROCHLORIDE 0.2 MG: 1 INJECTION, SOLUTION INTRAMUSCULAR; INTRAVENOUS; SUBCUTANEOUS at 10:48

## 2020-03-20 RX ADMIN — HYDRALAZINE HYDROCHLORIDE 10 MG: 20 INJECTION INTRAMUSCULAR; INTRAVENOUS at 08:33

## 2020-03-20 RX ADMIN — ACETAMINOPHEN 650 MG: 325 TABLET ORAL at 09:05

## 2020-03-20 RX ADMIN — AMLODIPINE BESYLATE 10 MG: 5 TABLET ORAL at 09:10

## 2020-03-20 RX ADMIN — HYDROMORPHONE HYDROCHLORIDE 0.2 MG: 1 INJECTION, SOLUTION INTRAMUSCULAR; INTRAVENOUS; SUBCUTANEOUS at 22:33

## 2020-03-21 PROBLEM — R74.8 ELEVATED ALKALINE PHOSPHATASE LEVEL: Status: ACTIVE | Noted: 2020-03-21

## 2020-03-21 PROBLEM — D64.9 ANEMIA: Chronic | Status: ACTIVE | Noted: 2020-03-21

## 2020-03-21 PROBLEM — R93.5 ABNORMAL CT OF THE ABDOMEN: Status: ACTIVE | Noted: 2020-03-20

## 2020-03-21 PROBLEM — I50.32 CHRONIC DIASTOLIC CONGESTIVE HEART FAILURE (HCC): Chronic | Status: ACTIVE | Noted: 2020-03-20

## 2020-03-21 PROBLEM — D64.9 ANEMIA: Status: ACTIVE | Noted: 2020-03-21

## 2020-03-21 LAB
ALBUMIN SERPL BCP-MCNC: 3.4 G/DL (ref 3.5–5)
ALP SERPL-CCNC: 126 U/L (ref 46–116)
ALT SERPL W P-5'-P-CCNC: 19 U/L (ref 12–78)
ANION GAP SERPL CALCULATED.3IONS-SCNC: 10 MMOL/L (ref 4–13)
AST SERPL W P-5'-P-CCNC: 20 U/L (ref 5–45)
BILIRUB SERPL-MCNC: 0.9 MG/DL (ref 0.2–1)
BUN SERPL-MCNC: 15 MG/DL (ref 5–25)
CALCIUM SERPL-MCNC: 8.8 MG/DL (ref 8.3–10.1)
CHLORIDE SERPL-SCNC: 103 MMOL/L (ref 100–108)
CO2 SERPL-SCNC: 26 MMOL/L (ref 21–32)
CREAT SERPL-MCNC: 0.99 MG/DL (ref 0.6–1.3)
GFR SERPL CREATININE-BSD FRML MDRD: 50 ML/MIN/1.73SQ M
GLUCOSE SERPL-MCNC: 101 MG/DL (ref 65–140)
MAGNESIUM SERPL-MCNC: 2.6 MG/DL (ref 1.6–2.6)
POTASSIUM SERPL-SCNC: 3.8 MMOL/L (ref 3.5–5.3)
PROT SERPL-MCNC: 6.5 G/DL (ref 6.4–8.2)
SODIUM SERPL-SCNC: 139 MMOL/L (ref 136–145)

## 2020-03-21 PROCEDURE — 80053 COMPREHEN METABOLIC PANEL: CPT | Performed by: NURSE PRACTITIONER

## 2020-03-21 PROCEDURE — 86301 IMMUNOASSAY TUMOR CA 19-9: CPT | Performed by: PHYSICIAN ASSISTANT

## 2020-03-21 PROCEDURE — 97163 PT EVAL HIGH COMPLEX 45 MIN: CPT

## 2020-03-21 PROCEDURE — 99232 SBSQ HOSP IP/OBS MODERATE 35: CPT | Performed by: INTERNAL MEDICINE

## 2020-03-21 PROCEDURE — 83735 ASSAY OF MAGNESIUM: CPT | Performed by: NURSE PRACTITIONER

## 2020-03-21 PROCEDURE — 99222 1ST HOSP IP/OBS MODERATE 55: CPT | Performed by: SURGERY

## 2020-03-21 RX ORDER — GABAPENTIN 100 MG/1
200 CAPSULE ORAL
Status: DISCONTINUED | OUTPATIENT
Start: 2020-03-21 | End: 2020-03-25 | Stop reason: HOSPADM

## 2020-03-21 RX ORDER — ATORVASTATIN CALCIUM 20 MG/1
20 TABLET, FILM COATED ORAL EVERY EVENING
Status: DISCONTINUED | OUTPATIENT
Start: 2020-03-21 | End: 2020-03-25 | Stop reason: HOSPADM

## 2020-03-21 RX ORDER — ONDANSETRON 2 MG/ML
4 INJECTION INTRAMUSCULAR; INTRAVENOUS EVERY 4 HOURS PRN
Status: DISCONTINUED | OUTPATIENT
Start: 2020-03-21 | End: 2020-03-25 | Stop reason: HOSPADM

## 2020-03-21 RX ORDER — SODIUM PHOSPHATE,MONO-DIBASIC 19G-7G/118
1 ENEMA (ML) RECTAL ONCE
Status: COMPLETED | OUTPATIENT
Start: 2020-03-21 | End: 2020-03-21

## 2020-03-21 RX ORDER — LANOLIN ALCOHOL/MO/W.PET/CERES
3 CREAM (GRAM) TOPICAL
Status: DISCONTINUED | OUTPATIENT
Start: 2020-03-21 | End: 2020-03-25 | Stop reason: HOSPADM

## 2020-03-21 RX ADMIN — SENNOSIDES AND DOCUSATE SODIUM 1 TABLET: 8.6; 5 TABLET ORAL at 22:01

## 2020-03-21 RX ADMIN — ONDANSETRON 4 MG: 2 INJECTION INTRAMUSCULAR; INTRAVENOUS at 07:37

## 2020-03-21 RX ADMIN — LIDOCAINE 1 PATCH: 50 PATCH CUTANEOUS at 09:09

## 2020-03-21 RX ADMIN — HYDROMORPHONE HYDROCHLORIDE 0.2 MG: 1 INJECTION, SOLUTION INTRAMUSCULAR; INTRAVENOUS; SUBCUTANEOUS at 05:58

## 2020-03-21 RX ADMIN — ACETAMINOPHEN 650 MG: 325 TABLET ORAL at 15:54

## 2020-03-21 RX ADMIN — MELATONIN 3 MG: at 22:01

## 2020-03-21 RX ADMIN — ATORVASTATIN CALCIUM 20 MG: 20 TABLET, FILM COATED ORAL at 17:25

## 2020-03-21 RX ADMIN — SODIUM PHOSPHATE 1 ENEMA: 7; 19 ENEMA RECTAL at 13:36

## 2020-03-21 RX ADMIN — GABAPENTIN 200 MG: 100 CAPSULE ORAL at 22:01

## 2020-03-21 RX ADMIN — MELATONIN 3 MG: at 00:42

## 2020-03-21 RX ADMIN — AMLODIPINE BESYLATE 10 MG: 5 TABLET ORAL at 09:09

## 2020-03-21 RX ADMIN — POLYETHYLENE GLYCOL 3350 17 G: 17 POWDER, FOR SOLUTION ORAL at 09:09

## 2020-03-22 LAB — CANCER AG19-9 SERPL-ACNC: 25 U/ML (ref 0–35)

## 2020-03-22 PROCEDURE — 99232 SBSQ HOSP IP/OBS MODERATE 35: CPT | Performed by: INTERNAL MEDICINE

## 2020-03-22 PROCEDURE — 99232 SBSQ HOSP IP/OBS MODERATE 35: CPT | Performed by: PHYSICIAN ASSISTANT

## 2020-03-22 RX ORDER — OXYCODONE HYDROCHLORIDE 5 MG/1
5 TABLET ORAL EVERY 4 HOURS PRN
Status: DISCONTINUED | OUTPATIENT
Start: 2020-03-22 | End: 2020-03-24

## 2020-03-22 RX ORDER — OXYCODONE HYDROCHLORIDE 5 MG/1
2.5 TABLET ORAL EVERY 4 HOURS PRN
Status: DISCONTINUED | OUTPATIENT
Start: 2020-03-22 | End: 2020-03-24

## 2020-03-22 RX ORDER — ACETAMINOPHEN 325 MG/1
975 TABLET ORAL EVERY 8 HOURS SCHEDULED
Status: DISCONTINUED | OUTPATIENT
Start: 2020-03-22 | End: 2020-03-25 | Stop reason: HOSPADM

## 2020-03-22 RX ORDER — HYDROMORPHONE HCL/PF 1 MG/ML
0.2 SYRINGE (ML) INJECTION EVERY 6 HOURS PRN
Status: DISCONTINUED | OUTPATIENT
Start: 2020-03-22 | End: 2020-03-24

## 2020-03-22 RX ADMIN — ACETAMINOPHEN 975 MG: 325 TABLET ORAL at 21:40

## 2020-03-22 RX ADMIN — POLYETHYLENE GLYCOL 3350, SODIUM SULFATE ANHYDROUS, SODIUM BICARBONATE, SODIUM CHLORIDE, POTASSIUM CHLORIDE 4000 ML: 236; 22.74; 6.74; 5.86; 2.97 POWDER, FOR SOLUTION ORAL at 16:00

## 2020-03-22 RX ADMIN — GABAPENTIN 200 MG: 100 CAPSULE ORAL at 21:40

## 2020-03-22 RX ADMIN — SENNOSIDES AND DOCUSATE SODIUM 1 TABLET: 8.6; 5 TABLET ORAL at 21:40

## 2020-03-22 RX ADMIN — ACETAMINOPHEN 650 MG: 325 TABLET ORAL at 00:45

## 2020-03-22 RX ADMIN — ATORVASTATIN CALCIUM 20 MG: 20 TABLET, FILM COATED ORAL at 17:29

## 2020-03-22 RX ADMIN — ACETAMINOPHEN 975 MG: 325 TABLET ORAL at 14:39

## 2020-03-22 RX ADMIN — ACETAMINOPHEN 975 MG: 325 TABLET ORAL at 09:49

## 2020-03-22 RX ADMIN — AMLODIPINE BESYLATE 10 MG: 5 TABLET ORAL at 08:53

## 2020-03-22 RX ADMIN — OXYCODONE HYDROCHLORIDE 2.5 MG: 5 TABLET ORAL at 23:37

## 2020-03-22 RX ADMIN — MELATONIN 3 MG: at 21:40

## 2020-03-23 ENCOUNTER — ANESTHESIA (INPATIENT)
Dept: GASTROENTEROLOGY | Facility: HOSPITAL | Age: 85
DRG: 844 | End: 2020-03-23
Payer: MEDICARE

## 2020-03-23 ENCOUNTER — ANESTHESIA EVENT (INPATIENT)
Dept: GASTROENTEROLOGY | Facility: HOSPITAL | Age: 85
DRG: 844 | End: 2020-03-23
Payer: MEDICARE

## 2020-03-23 ENCOUNTER — APPOINTMENT (INPATIENT)
Dept: GASTROENTEROLOGY | Facility: HOSPITAL | Age: 85
DRG: 844 | End: 2020-03-23
Attending: INTERNAL MEDICINE
Payer: MEDICARE

## 2020-03-23 ENCOUNTER — TELEPHONE (OUTPATIENT)
Dept: FAMILY MEDICINE CLINIC | Facility: HOSPITAL | Age: 85
End: 2020-03-23

## 2020-03-23 LAB
ALBUMIN SERPL BCP-MCNC: 2.7 G/DL (ref 3.5–5)
ALP SERPL-CCNC: 99 U/L (ref 46–116)
ALT SERPL W P-5'-P-CCNC: 17 U/L (ref 12–78)
ANION GAP SERPL CALCULATED.3IONS-SCNC: 8 MMOL/L (ref 4–13)
AST SERPL W P-5'-P-CCNC: 19 U/L (ref 5–45)
BASOPHILS # BLD AUTO: 0.01 THOUSANDS/ΜL (ref 0–0.1)
BASOPHILS NFR BLD AUTO: 0 % (ref 0–1)
BILIRUB SERPL-MCNC: 0.7 MG/DL (ref 0.2–1)
BUN SERPL-MCNC: 13 MG/DL (ref 5–25)
CALCIUM SERPL-MCNC: 8.4 MG/DL (ref 8.3–10.1)
CHLORIDE SERPL-SCNC: 102 MMOL/L (ref 100–108)
CO2 SERPL-SCNC: 27 MMOL/L (ref 21–32)
CREAT SERPL-MCNC: 1.04 MG/DL (ref 0.6–1.3)
EOSINOPHIL # BLD AUTO: 0.12 THOUSAND/ΜL (ref 0–0.61)
EOSINOPHIL NFR BLD AUTO: 2 % (ref 0–6)
ERYTHROCYTE [DISTWIDTH] IN BLOOD BY AUTOMATED COUNT: 16.4 % (ref 11.6–15.1)
GFR SERPL CREATININE-BSD FRML MDRD: 47 ML/MIN/1.73SQ M
GLUCOSE SERPL-MCNC: 88 MG/DL (ref 65–140)
HCT VFR BLD AUTO: 29 % (ref 34.8–46.1)
HGB BLD-MCNC: 8.9 G/DL (ref 11.5–15.4)
IMM GRANULOCYTES # BLD AUTO: 0.03 THOUSAND/UL (ref 0–0.2)
IMM GRANULOCYTES NFR BLD AUTO: 0 % (ref 0–2)
LYMPHOCYTES # BLD AUTO: 1.39 THOUSANDS/ΜL (ref 0.6–4.47)
LYMPHOCYTES NFR BLD AUTO: 18 % (ref 14–44)
MCH RBC QN AUTO: 27.6 PG (ref 26.8–34.3)
MCHC RBC AUTO-ENTMCNC: 30.7 G/DL (ref 31.4–37.4)
MCV RBC AUTO: 90 FL (ref 82–98)
MONOCYTES # BLD AUTO: 1.05 THOUSAND/ΜL (ref 0.17–1.22)
MONOCYTES NFR BLD AUTO: 14 % (ref 4–12)
NEUTROPHILS # BLD AUTO: 5.09 THOUSANDS/ΜL (ref 1.85–7.62)
NEUTS SEG NFR BLD AUTO: 66 % (ref 43–75)
NRBC BLD AUTO-RTO: 0 /100 WBCS
PLATELET # BLD AUTO: 163 THOUSANDS/UL (ref 149–390)
PMV BLD AUTO: 11.8 FL (ref 8.9–12.7)
POTASSIUM SERPL-SCNC: 3.2 MMOL/L (ref 3.5–5.3)
PROT SERPL-MCNC: 5.8 G/DL (ref 6.4–8.2)
RBC # BLD AUTO: 3.23 MILLION/UL (ref 3.81–5.12)
SODIUM SERPL-SCNC: 137 MMOL/L (ref 136–145)
WBC # BLD AUTO: 7.69 THOUSAND/UL (ref 4.31–10.16)

## 2020-03-23 PROCEDURE — 80053 COMPREHEN METABOLIC PANEL: CPT | Performed by: PHYSICIAN ASSISTANT

## 2020-03-23 PROCEDURE — 0DJD8ZZ INSPECTION OF LOWER INTESTINAL TRACT, VIA NATURAL OR ARTIFICIAL OPENING ENDOSCOPIC: ICD-10-PCS | Performed by: INTERNAL MEDICINE

## 2020-03-23 PROCEDURE — 99232 SBSQ HOSP IP/OBS MODERATE 35: CPT | Performed by: INTERNAL MEDICINE

## 2020-03-23 PROCEDURE — 85025 COMPLETE CBC W/AUTO DIFF WBC: CPT | Performed by: PHYSICIAN ASSISTANT

## 2020-03-23 PROCEDURE — 45378 DIAGNOSTIC COLONOSCOPY: CPT | Performed by: INTERNAL MEDICINE

## 2020-03-23 RX ORDER — PROPOFOL 10 MG/ML
INJECTION, EMULSION INTRAVENOUS AS NEEDED
Status: DISCONTINUED | OUTPATIENT
Start: 2020-03-23 | End: 2020-03-23 | Stop reason: SURG

## 2020-03-23 RX ORDER — SODIUM CHLORIDE 9 MG/ML
INJECTION, SOLUTION INTRAVENOUS CONTINUOUS PRN
Status: DISCONTINUED | OUTPATIENT
Start: 2020-03-23 | End: 2020-03-23 | Stop reason: SURG

## 2020-03-23 RX ORDER — FENTANYL CITRATE/PF 50 MCG/ML
25 SYRINGE (ML) INJECTION
Status: DISCONTINUED | OUTPATIENT
Start: 2020-03-23 | End: 2020-03-23

## 2020-03-23 RX ADMIN — ATORVASTATIN CALCIUM 20 MG: 20 TABLET, FILM COATED ORAL at 17:55

## 2020-03-23 RX ADMIN — ACETAMINOPHEN 975 MG: 325 TABLET ORAL at 21:59

## 2020-03-23 RX ADMIN — PROPOFOL 200 MG: 10 INJECTION, EMULSION INTRAVENOUS at 14:12

## 2020-03-23 RX ADMIN — POLYETHYLENE GLYCOL 3350 17 G: 17 POWDER, FOR SOLUTION ORAL at 09:19

## 2020-03-23 RX ADMIN — AMLODIPINE BESYLATE 10 MG: 5 TABLET ORAL at 09:19

## 2020-03-23 RX ADMIN — SENNOSIDES AND DOCUSATE SODIUM 1 TABLET: 8.6; 5 TABLET ORAL at 21:59

## 2020-03-23 RX ADMIN — PROPOFOL 50 MG: 10 INJECTION, EMULSION INTRAVENOUS at 14:25

## 2020-03-23 RX ADMIN — GABAPENTIN 200 MG: 100 CAPSULE ORAL at 21:59

## 2020-03-23 RX ADMIN — ACETAMINOPHEN 975 MG: 325 TABLET ORAL at 15:25

## 2020-03-23 RX ADMIN — MELATONIN 3 MG: at 21:59

## 2020-03-23 RX ADMIN — SODIUM CHLORIDE: 0.9 INJECTION, SOLUTION INTRAVENOUS at 14:00

## 2020-03-23 NOTE — ANESTHESIA PREPROCEDURE EVALUATION
Review of Systems/Medical History  Patient summary reviewed  Chart reviewed      Cardiovascular  EKG reviewed, Hyperlipidemia, Hypertension , Past MI > 6 months, CAD , History of CABG (2005, 2012, AVR 2012), Dysrhythmias , atrial fibrillation,    Pulmonary       GI/Hepatic            Endo/Other     GYN       Hematology   Musculoskeletal    Arthritis     Neurology   Psychology     Chronic pain (Hx Shingles, chronic pain Right thigh),            Physical Exam    Airway    Mallampati score: I  TM Distance: >3 FB  Neck ROM: full     Dental       Cardiovascular  Rhythm: irregular,     Pulmonary  Decreased breath sounds,     Other Findings        Anesthesia Plan  ASA Score- 3     Anesthesia Type- IV sedation with anesthesia with ASA Monitors  Additional Monitors:   Airway Plan:         Plan Factors-    Induction- intravenous  Postoperative Plan-     Informed Consent- Anesthetic plan and risks discussed with patient  I personally reviewed this patient with the CRNA  Discussed and agreed on the Anesthesia Plan with the CRNA  Za Schroeder

## 2020-03-23 NOTE — TELEPHONE ENCOUNTER
I spoke with daughter Mar Pina and Sixto Thompson junk 0 the patient's nurse  She just had her colonoscopy done but they will not be able to do the IR biopsy until Wednesday as an IR physician is not available at [facility]  Family is concerned that her hearing is compromised at she is not always hearing all the instructions    She is still having significant back pain from her fall and they are trying various meds as well as Lidoderm patch to see if that may help

## 2020-03-24 LAB
ANION GAP SERPL CALCULATED.3IONS-SCNC: 8 MMOL/L (ref 4–13)
BUN SERPL-MCNC: 12 MG/DL (ref 5–25)
CALCIUM SERPL-MCNC: 8.3 MG/DL (ref 8.3–10.1)
CHLORIDE SERPL-SCNC: 106 MMOL/L (ref 100–108)
CO2 SERPL-SCNC: 28 MMOL/L (ref 21–32)
CREAT SERPL-MCNC: 1.02 MG/DL (ref 0.6–1.3)
ERYTHROCYTE [DISTWIDTH] IN BLOOD BY AUTOMATED COUNT: 17 % (ref 11.6–15.1)
GFR SERPL CREATININE-BSD FRML MDRD: 49 ML/MIN/1.73SQ M
GLUCOSE SERPL-MCNC: 92 MG/DL (ref 65–140)
HCT VFR BLD AUTO: 28.2 % (ref 34.8–46.1)
HGB BLD-MCNC: 9.1 G/DL (ref 11.5–15.4)
MCH RBC QN AUTO: 28.3 PG (ref 26.8–34.3)
MCHC RBC AUTO-ENTMCNC: 32.3 G/DL (ref 31.4–37.4)
MCV RBC AUTO: 88 FL (ref 82–98)
PLATELET # BLD AUTO: 160 THOUSANDS/UL (ref 149–390)
PMV BLD AUTO: 11.3 FL (ref 8.9–12.7)
POTASSIUM SERPL-SCNC: 3.1 MMOL/L (ref 3.5–5.3)
RBC # BLD AUTO: 3.21 MILLION/UL (ref 3.81–5.12)
SODIUM SERPL-SCNC: 142 MMOL/L (ref 136–145)
WBC # BLD AUTO: 7.06 THOUSAND/UL (ref 4.31–10.16)

## 2020-03-24 PROCEDURE — 99232 SBSQ HOSP IP/OBS MODERATE 35: CPT | Performed by: INTERNAL MEDICINE

## 2020-03-24 PROCEDURE — 80048 BASIC METABOLIC PNL TOTAL CA: CPT | Performed by: INTERNAL MEDICINE

## 2020-03-24 PROCEDURE — 85027 COMPLETE CBC AUTOMATED: CPT | Performed by: INTERNAL MEDICINE

## 2020-03-24 RX ORDER — POTASSIUM CHLORIDE 20 MEQ/1
40 TABLET, EXTENDED RELEASE ORAL ONCE
Status: COMPLETED | OUTPATIENT
Start: 2020-03-24 | End: 2020-03-24

## 2020-03-24 RX ADMIN — ACETAMINOPHEN 975 MG: 325 TABLET ORAL at 14:46

## 2020-03-24 RX ADMIN — POTASSIUM CHLORIDE 40 MEQ: 1500 TABLET, EXTENDED RELEASE ORAL at 09:22

## 2020-03-24 RX ADMIN — ACETAMINOPHEN 975 MG: 325 TABLET ORAL at 21:34

## 2020-03-24 RX ADMIN — AMLODIPINE BESYLATE 10 MG: 5 TABLET ORAL at 09:20

## 2020-03-24 RX ADMIN — SENNOSIDES AND DOCUSATE SODIUM 1 TABLET: 8.6; 5 TABLET ORAL at 21:31

## 2020-03-24 RX ADMIN — ACETAMINOPHEN 975 MG: 325 TABLET ORAL at 06:08

## 2020-03-24 RX ADMIN — MELATONIN 3 MG: at 21:31

## 2020-03-24 RX ADMIN — ATORVASTATIN CALCIUM 20 MG: 20 TABLET, FILM COATED ORAL at 17:26

## 2020-03-24 RX ADMIN — GABAPENTIN 200 MG: 100 CAPSULE ORAL at 21:30

## 2020-03-25 ENCOUNTER — APPOINTMENT (INPATIENT)
Dept: CT IMAGING | Facility: HOSPITAL | Age: 85
DRG: 844 | End: 2020-03-25
Attending: INTERNAL MEDICINE
Payer: MEDICARE

## 2020-03-25 VITALS
TEMPERATURE: 98.3 F | BODY MASS INDEX: 21.55 KG/M2 | DIASTOLIC BLOOD PRESSURE: 61 MMHG | OXYGEN SATURATION: 91 % | SYSTOLIC BLOOD PRESSURE: 159 MMHG | RESPIRATION RATE: 22 BRPM | HEART RATE: 69 BPM | WEIGHT: 123.6 LBS

## 2020-03-25 LAB
INR PPP: 1.01 (ref 0.84–1.19)
PROTHROMBIN TIME: 13 SECONDS (ref 11.6–14.5)

## 2020-03-25 PROCEDURE — 88307 TISSUE EXAM BY PATHOLOGIST: CPT | Performed by: PATHOLOGY

## 2020-03-25 PROCEDURE — 99152 MOD SED SAME PHYS/QHP 5/>YRS: CPT

## 2020-03-25 PROCEDURE — 47000 NEEDLE BIOPSY OF LIVER PERQ: CPT

## 2020-03-25 PROCEDURE — 88333 PATH CONSLTJ SURG CYTO XM 1: CPT | Performed by: PATHOLOGY

## 2020-03-25 PROCEDURE — 88342 IMHCHEM/IMCYTCHM 1ST ANTB: CPT | Performed by: PATHOLOGY

## 2020-03-25 PROCEDURE — 99232 SBSQ HOSP IP/OBS MODERATE 35: CPT | Performed by: INTERNAL MEDICINE

## 2020-03-25 PROCEDURE — 47000 NEEDLE BIOPSY OF LIVER PERQ: CPT | Performed by: RADIOLOGY

## 2020-03-25 PROCEDURE — 88341 IMHCHEM/IMCYTCHM EA ADD ANTB: CPT | Performed by: PATHOLOGY

## 2020-03-25 PROCEDURE — 0FB23ZX EXCISION OF LEFT LOBE LIVER, PERCUTANEOUS APPROACH, DIAGNOSTIC: ICD-10-PCS | Performed by: RADIOLOGY

## 2020-03-25 PROCEDURE — 99239 HOSP IP/OBS DSCHRG MGMT >30: CPT | Performed by: INTERNAL MEDICINE

## 2020-03-25 PROCEDURE — 77012 CT SCAN FOR NEEDLE BIOPSY: CPT

## 2020-03-25 PROCEDURE — 99153 MOD SED SAME PHYS/QHP EA: CPT

## 2020-03-25 PROCEDURE — 77012 CT SCAN FOR NEEDLE BIOPSY: CPT | Performed by: RADIOLOGY

## 2020-03-25 PROCEDURE — 99152 MOD SED SAME PHYS/QHP 5/>YRS: CPT | Performed by: RADIOLOGY

## 2020-03-25 PROCEDURE — 85610 PROTHROMBIN TIME: CPT | Performed by: INTERNAL MEDICINE

## 2020-03-25 RX ORDER — FENTANYL CITRATE 50 UG/ML
INJECTION, SOLUTION INTRAMUSCULAR; INTRAVENOUS CODE/TRAUMA/SEDATION MEDICATION
Status: COMPLETED | OUTPATIENT
Start: 2020-03-25 | End: 2020-03-25

## 2020-03-25 RX ORDER — AMOXICILLIN 250 MG
1 CAPSULE ORAL DAILY PRN
Qty: 9 TABLET | Refills: 0 | Status: SHIPPED | OUTPATIENT
Start: 2020-03-25 | End: 2020-03-30 | Stop reason: SDUPTHER

## 2020-03-25 RX ORDER — MIDAZOLAM HYDROCHLORIDE 2 MG/2ML
INJECTION, SOLUTION INTRAMUSCULAR; INTRAVENOUS CODE/TRAUMA/SEDATION MEDICATION
Status: COMPLETED | OUTPATIENT
Start: 2020-03-25 | End: 2020-03-25

## 2020-03-25 RX ADMIN — ACETAMINOPHEN 975 MG: 325 TABLET ORAL at 05:34

## 2020-03-25 RX ADMIN — AMLODIPINE BESYLATE 10 MG: 5 TABLET ORAL at 08:08

## 2020-03-25 RX ADMIN — MIDAZOLAM 1 MG: 1 INJECTION INTRAMUSCULAR; INTRAVENOUS at 12:05

## 2020-03-25 RX ADMIN — FENTANYL CITRATE 50 MCG: 50 INJECTION, SOLUTION INTRAMUSCULAR; INTRAVENOUS at 12:05

## 2020-03-25 RX ADMIN — ACETAMINOPHEN 975 MG: 325 TABLET ORAL at 15:08

## 2020-03-26 ENCOUNTER — TELEPHONE (OUTPATIENT)
Dept: FAMILY MEDICINE CLINIC | Facility: HOSPITAL | Age: 85
End: 2020-03-26

## 2020-03-26 ENCOUNTER — TELEPHONE (OUTPATIENT)
Dept: CARDIOLOGY CLINIC | Facility: CLINIC | Age: 85
End: 2020-03-26

## 2020-03-26 NOTE — TELEPHONE ENCOUNTER
Danuta Cam was recently released from the hospital  She has low back pain and was diagnosed with severe arthritis  She currently takes the maximum daily dose of Tylenol as well as 2 gabapentin at bed time  Raad Morse is asking if she can take a Tramadol at bedtime in addition to the Gabapentin  She has this at home currently  Or is there something else that you can prescribe that may help?

## 2020-03-26 NOTE — TELEPHONE ENCOUNTER
I spoke with patient and informed her to take the tramadol 50 mg  3 times daily for her back pain  I did review the CT of the lumbar spine that had been done in the hospital which does show significant arthritis in the lumbar spine and lateral stenosis at several levels  If she is not improving will consider increasing gabapentin again  I have informed her to call for a refill, if she is running low on her supply of the tramadol which she had on hand from her post herpetic neuralgia

## 2020-03-26 NOTE — TELEPHONE ENCOUNTER
Pt's daughter called - pt was recently in [facility]  Amiodarone was stopped in hosp and pt was instructed to stay off at D/C  Pt's daughter wanted to know if you agree with this  Please advise, thank you  (Daughter was advised that MD is out of office, requested that in office physician verify until her regular Cardio returns  Per Keren Higgins to hold until Dr Monik Laughlin responds)

## 2020-03-26 NOTE — TELEPHONE ENCOUNTER
I reviewed my last office note and her hospitalization  I think it is very reasonable for her to remain off of amiodarone  I actually contemplated this at our last visit, as when she was in atrial fibrillation she did not seem to feel any different  Therefore we will leave her off amiodarone, and discuss any other changes at our next office visit  Thank you

## 2020-03-27 ENCOUNTER — TRANSITIONAL CARE MANAGEMENT (OUTPATIENT)
Dept: FAMILY MEDICINE CLINIC | Facility: HOSPITAL | Age: 85
End: 2020-03-27

## 2020-03-30 ENCOUNTER — OFFICE VISIT (OUTPATIENT)
Dept: FAMILY MEDICINE CLINIC | Facility: HOSPITAL | Age: 85
End: 2020-03-30
Payer: MEDICARE

## 2020-03-30 ENCOUNTER — TELEPHONE (OUTPATIENT)
Dept: FAMILY MEDICINE CLINIC | Facility: HOSPITAL | Age: 85
End: 2020-03-30

## 2020-03-30 ENCOUNTER — TELEMEDICINE (OUTPATIENT)
Dept: GASTROENTEROLOGY | Facility: CLINIC | Age: 85
End: 2020-03-30
Payer: MEDICARE

## 2020-03-30 ENCOUNTER — TELEPHONE (OUTPATIENT)
Dept: GASTROENTEROLOGY | Facility: CLINIC | Age: 85
End: 2020-03-30

## 2020-03-30 VITALS — HEIGHT: 62 IN | WEIGHT: 120 LBS | BODY MASS INDEX: 22.08 KG/M2

## 2020-03-30 VITALS
WEIGHT: 124.4 LBS | DIASTOLIC BLOOD PRESSURE: 76 MMHG | TEMPERATURE: 98.6 F | SYSTOLIC BLOOD PRESSURE: 126 MMHG | OXYGEN SATURATION: 98 % | BODY MASS INDEX: 22.75 KG/M2 | HEART RATE: 58 BPM

## 2020-03-30 DIAGNOSIS — K59.01 SLOW TRANSIT CONSTIPATION: ICD-10-CM

## 2020-03-30 DIAGNOSIS — I48.0 PAF (PAROXYSMAL ATRIAL FIBRILLATION) (HCC): ICD-10-CM

## 2020-03-30 DIAGNOSIS — C22.9 ADENOCARCINOMA DETERMINED BY BIOPSY OF LIVER (HCC): Primary | ICD-10-CM

## 2020-03-30 DIAGNOSIS — I10 ESSENTIAL HYPERTENSION: ICD-10-CM

## 2020-03-30 DIAGNOSIS — C22.9 ADENOCARCINOMA DETERMINED BY BIOPSY OF LIVER (HCC): ICD-10-CM

## 2020-03-30 DIAGNOSIS — M54.50 ACUTE BILATERAL LOW BACK PAIN WITHOUT SCIATICA: ICD-10-CM

## 2020-03-30 DIAGNOSIS — G62.9 PERIPHERAL POLYNEUROPATHY: ICD-10-CM

## 2020-03-30 DIAGNOSIS — Z76.89 ENCOUNTER FOR SUPPORT AND COORDINATION OF TRANSITION OF CARE: ICD-10-CM

## 2020-03-30 DIAGNOSIS — Z79.01 CHRONIC ANTICOAGULATION: ICD-10-CM

## 2020-03-30 DIAGNOSIS — S81.801S: ICD-10-CM

## 2020-03-30 DIAGNOSIS — I49.5 SICK SINUS SYNDROME (HCC): Primary | Chronic | ICD-10-CM

## 2020-03-30 DIAGNOSIS — E87.6 HYPOKALEMIA: ICD-10-CM

## 2020-03-30 DIAGNOSIS — I48.0 PAROXYSMAL ATRIAL FIBRILLATION (HCC): Chronic | ICD-10-CM

## 2020-03-30 DIAGNOSIS — E78.5 HYPERLIPIDEMIA, UNSPECIFIED HYPERLIPIDEMIA TYPE: ICD-10-CM

## 2020-03-30 PROCEDURE — 99496 TRANSJ CARE MGMT HIGH F2F 7D: CPT | Performed by: INTERNAL MEDICINE

## 2020-03-30 PROCEDURE — 99215 OFFICE O/P EST HI 40 MIN: CPT | Performed by: INTERNAL MEDICINE

## 2020-03-30 RX ORDER — AMOXICILLIN 250 MG
1 CAPSULE ORAL DAILY
Qty: 9 TABLET | Refills: 0
Start: 2020-03-30

## 2020-03-30 RX ORDER — GABAPENTIN 100 MG/1
200 CAPSULE ORAL 2 TIMES DAILY
Qty: 180 CAPSULE | Refills: 3 | Status: SHIPPED | OUTPATIENT
Start: 2020-03-30 | End: 2020-07-20 | Stop reason: SDUPTHER

## 2020-03-30 RX ORDER — AMLODIPINE BESYLATE 2.5 MG/1
2.5 TABLET ORAL DAILY
Qty: 90 TABLET | Refills: 3 | Status: SHIPPED | OUTPATIENT
Start: 2020-03-30

## 2020-03-30 RX ORDER — CEPHALEXIN 250 MG/1
250 CAPSULE ORAL EVERY 8 HOURS SCHEDULED
Qty: 28 CAPSULE | Refills: 0 | Status: SHIPPED | OUTPATIENT
Start: 2020-03-30 | End: 2020-04-06

## 2020-03-30 RX ORDER — POTASSIUM CHLORIDE 750 MG/1
10 TABLET, EXTENDED RELEASE ORAL 2 TIMES DAILY
Qty: 180 TABLET | Refills: 3 | Status: SHIPPED | OUTPATIENT
Start: 2020-03-30 | End: 2020-05-21 | Stop reason: ALTCHOICE

## 2020-03-30 RX ORDER — CYCLOBENZAPRINE HCL 5 MG
5 TABLET ORAL 2 TIMES DAILY PRN
Qty: 60 TABLET | Refills: 0 | Status: SHIPPED | OUTPATIENT
Start: 2020-03-30 | End: 2020-04-09 | Stop reason: ALTCHOICE

## 2020-03-30 RX ORDER — POLYETHYLENE GLYCOL 3350 17 G/17G
17 POWDER, FOR SOLUTION ORAL DAILY
Start: 2020-03-30

## 2020-03-30 RX ORDER — ATORVASTATIN CALCIUM 20 MG/1
20 TABLET, FILM COATED ORAL EVERY EVENING
Qty: 90 TABLET | Refills: 3 | Status: SHIPPED | OUTPATIENT
Start: 2020-03-30

## 2020-03-30 NOTE — PATIENT INSTRUCTIONS
Take torsemide 20 mg in Am and 20 mg at 1pm for 2 days- weigh daily in am- then resume 20 mg daily- call with update on Friday AM  Do EGD when Gi is available- hold eliquis for 2 days before that appt

## 2020-03-30 NOTE — TELEPHONE ENCOUNTER
----- Message from Hollie Kay MD sent at 3/30/2020  1:47 PM EDT -----  Pt with adenocarcinoma  Needs oncology referral after EGD

## 2020-03-30 NOTE — TELEPHONE ENCOUNTER
Dr Rishi Campbell placed oncology referral in at [address] [facility]  Staff message sent to [facility]

## 2020-03-30 NOTE — PROGRESS NOTES
Virtual Regular Visit    Problem List Items Addressed This Visit     None      Visit Diagnoses     Adenocarcinoma determined by biopsy of liver ([facility] [address] )    -  Primary    Relevant Orders    Ambulatory referral to Hematology / Oncology    Slow transit constipation        Relevant Medications    senna-docusate sodium (SENOKOT S) 8 6-50 mg per tablet    polyethylene glycol (GLYCOLAX) powder    Chronic anticoagulation        Relevant Medications    apixaban (Eliquis) 5 mg    PAF (paroxysmal atrial fibrillation) (HCC)        Relevant Medications    apixaban (Eliquis) 5 mg               Reason for visit is hospital follow-up for liver adenoma carcinoma and constipation    Encounter provider Glady Ahumada, MD    Provider located at [address] [address] [phone]      Recent Visits  No visits were found meeting these conditions  Showing recent visits within past 7 days and meeting all other requirements     Today's Visits  Date Type Provider Dept   03/30/20 Telephone Lazarus Eth, [address] [facility]   03/30/20 Telemedicine [facility]ady [facility]ada[facility]   Showing today's visits and meeting all other requirements     Future Appointments  Date Type Provider Dept   03/30/20 Telephone Lazarus Eth, [address] [facility]   Showing future appointments within next 150 days and meeting all other requirements        The patient was identified by name and date of birth  Chan Eaton was informed that this is a telemedicine visit and that the visit is being conducted through [address] [place] [address] and patient was informed that this is not a secure, HIPAA-complaint platform  she agrees to proceed     My office door was closed  No one else was in the room  She acknowledged consent and understanding of privacy and security of the video platform   The patient has agreed to participate and understands they can discontinue the visit at any time  Patient is aware this is a billable service  Subjective  Luz Gamez is a 80 y o  female was recently admitted to the hospital last week for abdominal pain  At that time, CT scan showed a narrowing at the splenic flexure with stool retention as well as a 2 8 cm right hepatic lobe liver mass which was suspicious for metastatic colon cancer  Patient proceeded to get a colonoscopy  With an adequate prep, it was determined that there was no intraluminal mass or stricture  A lot of stool backup noted on CT scan so bowel regimen was started with improvement of her constipation  AFP and CEA were elevated  Because the colonoscopy was negative, a liver biopsy was performed prior to discharge  The liver biopsy results are in, adenocarcinoma with staining patterns leaning towards cholangiocarcinoma, pancreaticobiliary region, or upper GI  Since discharge, patient states that she has been feeling well  She is moving her bowels  Denies any nausea vomiting or abdominal pains  She does have a little bit more of gurgling since starting prune juice and fiber  No bleeding  Appetite is fair  ASSESSMENT AND PLAN:      1  Adenocarcinoma determined by biopsy of liver [facility])  80-year-old female with new diagnosis of adenocarcinoma of the liver, unclear if this is primary liver HCC versus secondary metastatic adenocarcinoma from upper GI or pancreas, biliary region  Negative colonoscopy last week  I had a long discussion with the patient and daughter Amanda Shields  I answer other questions regarding what the outcomes were  I explained that at this point, because we do not yet know what the primary tumor is from, it would be hard to discuss any outcome events  The feel that she has a good quality of life and this is the most important thing for them  At this point, I advised that we proceed with an upper endoscopy to rule out upper GI sources    AFP and CEA elevated, CA 19-9 normal     - Schedule EGD @ [facility]  - Referral to oncology  - Pt's daughter has reached out to Dr Bulmaro Meza - ultimately, pending primary tumor, decisions regarding resection, RFA, chemo, etc can be determined  They understand and at this point, would like to continue with the diagnostic work up  - Ambulatory referral to Hematology / Oncology; Future    2  Slow transit constipation  CT showing big stool burden  A lot of cramping from constipation which initially brought her to the ER last week  Regardless, at this point she is moving her bowels and feeling better  Continue the Senokot and Glycolax  I advised that if she is having less bowel movements, she can increase the MiraLax to twice a day  Additionally, continue high fiber diet, prune juice, increased water intake  - senna-docusate sodium (SENOKOT S) 8 6-50 mg per tablet; Take 1 tablet by mouth daily  Dispense: 9 tablet; Refill: 0  - polyethylene glycol (GLYCOLAX) powder; Take 17 g by mouth daily    3  Chronic anticoagulation  One Eliquis at baseline  Patient will need to hold this prior to EGD for 2 days  Risks and benefits of holding the anticoagulation prior to the procedure discussed with the patient and daughter Marly Joshua  They understand the risks of embolic events while holding the anticoagulation  Patient understands the risks of bleeding if continuation of the anticoagulation is chosen  - hold Eliquis, will need cardiac clearance    4  PAF (paroxysmal atrial fibrillation) (HCC)    - takes apixaban (Eliquis) 5 mg;  Take 1 tablet (5 mg total) by mouth 2 (two) times a day        Followup Appointment:  Pending above workup  ______________________________________________________________________      Historical Information   Past Medical History:   Diagnosis Date    Acute myocardial infarction [facility])     Aortic valve disease     Atrial fibrillation ([facility] )     CAD (coronary artery disease)     Cancer ([facility] ) skin cancer removed from left leg    Cellulitis      AND ABSCESS    Cervical stenosis of spine     CHF (congestive heart failure) (HCC)     Cholecystitis     Closed Colles' fracture     OF THE LEFT WRIST; LAST ASSESSED: 5/2/14    Closed fracture of radius     LAST ASSESSED: 2/19/14    Diverticulosis     Dyspnea     Hair loss disorder     Hematuria     History of being hospitalized     1/5/12-1/14/12 Juan Diego MACIEL PROCEDURES: 1) REDO STERNOTOMY AND AORTIC VALVE REPLACEMENT WITH A 19-MM REBA-PETERS BOVINIE PERICARDIAL MAGNA EASE VALVE 2) TRANSESOPHAGEAL ECHOCARDIOGRAM     Hypercholesterolemia     Hypertension     Incomplete bladder emptying     Leg wound, left     Malaise and fatigue     Renal disorder     Sick sinus syndrome (HCC)     Urinary tract infection      Past Surgical History:   Procedure Laterality Date    AORTIC VALVE REPLACEMENT  01/2012    HEART VALVE    CARDIAC SURGERY      CATARACT EXTRACTION W/  INTRAOCULAR LENS IMPLANT  08/26/2010    Amrita Heredia MD; PHACOEMULISIFICATION; INSERTION INTRAOCULAR LENS OD    COLONOSCOPY  03/08/2007    CORONARY ARTERY BYPASS GRAFT  01/2005    IR IMAGE GUIDED BIOPSY/ASPIRATION LIVER  3/25/2020    LEG SURGERY      IA KNEE SCOPE,MED/LAT MENISECTOMY Left 9/23/2019    Procedure: ARTHROSCOPY KNEE, LEFT PARTIAL MEDIAL MENISCECTOMY;  Surgeon: Ciro Leal MD;  Location:  MAIN OR;  Service: Orthopedics     Social History     Substance and Sexual Activity   Alcohol Use Not Currently    Frequency: Never    Comment: social     Social History     Substance and Sexual Activity   Drug Use No     Social History     Tobacco Use   Smoking Status Former Smoker   Smokeless Tobacco Never Used   Tobacco Comment    QUIT 36 YEARS AGO ALSO NEVER A SMOKER AS PER [employer]TS     Family History   Problem Relation Age of Onset    Diabetes Mother         MELLITUS    Hypertension Mother     Heart disease Mother     Prostate cancer Father     Dementia Brother  Heart disease Maternal Grandmother     Hypertension Maternal Grandmother     Heart disease Maternal Grandfather     Hypertension Family     Osteoporosis Family     Substance Abuse Neg Hx     Mental illness Neg Hx        Meds/Allergies       Current Outpatient Medications:     amLODIPine (NORVASC) 2 5 mg tablet    apixaban (Eliquis) 5 mg    atorvastatin (LIPITOR) 20 mg tablet    Coenzyme Q10 (COQ10) 100 MG CAPS    conjugated estrogens (PREMARIN) vaginal cream    gabapentin (NEURONTIN) 100 mg capsule    Multiple Vitamins-Minerals (OCUVITE EXTRA) TABS    Omega-3 1000 MG CAPS    potassium chloride (K-DUR,KLOR-CON) 10 mEq tablet    senna-docusate sodium (SENOKOT S) 8 6-50 mg per tablet    torsemide (DEMADEX) 20 mg tablet    polyethylene glycol (GLYCOLAX) powder    Allergies   Allergen Reactions    Heparin      Annotation - [id number]: LOW PLATELETS    Phenazopyridine      As per daughter "Not appropriate for pt due to heart condition"       PHYSICAL EXAM:    Height 5' 2" (1 575 m), weight 54 4 kg (120 lb), not currently breastfeeding  Body mass index is 21 95 kg/m²  Appearance and vitals taken from home devices    General Appearance:   Alert, cooperative, no distress   HEENT:  Normocephalic, atraumatic, anicteric  Neck supple, symmetrical, trachea midline  Lungs:   Equal chest rise and unlabored breathing, normal effort, no coughing  Cardiovascular:   No visualized JVD or lower extremity edema  Abdomen:   No abdominal distension  Skin:   No jaundice, rashes, or lesions  Musculoskeletal:   Normal range of motion visualized  10 feet gait without difficulty and without assistive device  Psych:  Normal affect and normal insight  Neuro:  Alert and appropriate  Ox3  Slightly hard of hearing           Lab Results:   Lab Results   Component Value Date    WBC 7 06 03/24/2020    HGB 9 1 (L) 03/24/2020    HCT 28 2 (L) 03/24/2020    MCV 88 03/24/2020     03/24/2020     Lab Results Component Value Date     08/04/2015    K 3 1 (L) 03/24/2020     03/24/2020    CO2 28 03/24/2020    ANIONGAP 11 08/04/2015    BUN 12 03/24/2020    CREATININE 1 02 03/24/2020    GLUCOSE 92 08/04/2015    GLUF 85 09/16/2019    CALCIUM 8 3 03/24/2020    AST 19 03/23/2020    ALT 17 03/23/2020    ALKPHOS 99 03/23/2020    PROT 6 9 08/04/2015    BILITOT 0 53 08/04/2015    EGFR 49 03/24/2020     No results found for: IRON, TIBC, FERRITIN  Lab Results   Component Value Date    LIPASE 143 03/20/2020       Radiology Results:   Ct Chest Abdomen Pelvis Wo Contrast    Result Date: 3/20/2020  Narrative: CT CHEST, ABDOMEN AND PELVIS WITHOUT IV CONTRAST INDICATION:   splenic flexure mass, liver lesion, r/o metastatic disease to chest  COMPARISON:  CT abdomen pelvis from earlier today  MRI abdomen from earlier today  TECHNIQUE: CT examination of the chest, abdomen and pelvis was performed without intravenous contrast   Axial, sagittal, and coronal 2D reformatted images were created from the source data and submitted for interpretation  Radiation dose length product (DLP) for this visit:  580 mGy-cm   This examination, like all CT scans performed in the [facility], was performed utilizing techniques to minimize radiation dose exposure, including the use of iterative reconstruction and automated exposure control  Enteric contrast was administered  FINDINGS: CHEST LUNGS:  Mild biapical pleural/parenchymal scarring  PLEURA:  Unremarkable  HEART/GREAT VESSELS:  41 mm ascending aortic aneurysm  Atherosclerotic calcifications of the aorta and coronary arteries  Cardiomegaly  MEDIASTINUM AND MHENAZ:  Unremarkable  CHEST WALL AND LOWER NECK:   Unremarkable  ABDOMEN LIVER/BILIARY TREE:  Left hepatic mass with more peripheral left hepatic biliary dilation better appreciated on this MRI from earlier today  Please see that report   GALLBLADDER:  There are gallstone(s) within the gallbladder, without pericholecystic inflammatory changes  SPLEEN:  Unremarkable  PANCREAS:  Unremarkable  ADRENAL GLANDS:  Unremarkable  KIDNEYS/URETERS:  No hydronephrosis  IV contrast within the renal collecting systems from the earlier CT  STOMACH AND BOWEL:  Prominent stool in the proximal and mid colon without a discrete mass  No bowel obstruction  APPENDIX:  No findings to suggest appendicitis  ABDOMINOPELVIC CAVITY:  No ascites or free intraperitoneal air  No lymphadenopathy  VESSELS:  Unremarkable for patient's age  PELVIS REPRODUCTIVE ORGANS:  Unremarkable for patient's age  URINARY BLADDER:  Unremarkable  ABDOMINAL WALL/INGUINAL REGIONS:  Persistent small bowel containing right inguinal hernia without complication  No bowel obstruction  OSSEOUS STRUCTURES:  No acute fracture or destructive osseous lesion  Impression: Prominent stool throughout the colon without a colonic mass    No bowel obstruction  Known left hepatic lesion and more peripheral left hepatic biliary dilation is better appreciated on the MRI from earlier today  Please see that report Unchanged small bowel containing right inguinal hernia without complication  No metastatic disease to the chest  Workstation performed: [id number]     Xr Knee 4+ Views Left Injury    Result Date: 3/15/2020  Narrative: LEFT KNEE INDICATION:   Fall  COMPARISON:  9/4/2019 x-rays VIEWS:  XR KNEE 4+ VW LEFT INJURY Images: 4 FINDINGS: There is no acute fracture or dislocation  There is no joint effusion  Mild tricompartmental degenerative arthritis No lytic or blastic lesions are seen  Soft tissues are unremarkable  Scattered vascular calcifications     Impression: No acute osseous abnormality  Findings are stable Workstation performed: [id number]     Xr Knee 4+ Views Right Injury    Result Date: 3/15/2020  Narrative: RIGHT KNEE INDICATION:   Fall  COMPARISON:  9/15/2018 VIEWS:  XR KNEE 4+ VW RIGHT INJURY Images: 4 FINDINGS: There is no acute fracture or dislocation  There is no joint effusion  Persistent mild tricompartmental degenerative arthritis No lytic or blastic lesions are seen  Soft tissues are unremarkable  Persistent vascular calcifications     Impression: No acute osseous abnormality  Similar to previous study Workstation performed: [id number]     Trauma - Ct Head Wo Contrast    Result Date: 3/14/2020  Narrative: CT BRAIN - WITHOUT CONTRAST INDICATION: Fall, facial trauma  COMPARISON:  CT head 9/15/2018 TECHNIQUE:  CT examination of the brain was performed  In addition to axial images, coronal 2D reformatted images were created and submitted for interpretation  Radiation dose length product (DLP) for this visit:  800 87 mGy-cm   This examination, like all CT scans performed in the [facility], was performed utilizing techniques to minimize radiation dose exposure, including the use of iterative  reconstruction and automated exposure control  IMAGE QUALITY:  Diagnostic  FINDINGS: PARENCHYMA:  No intracranial mass, mass effect or midline shift  No CT signs of acute infarction  No acute parenchymal hemorrhage  Chronic lacunar infarct(s) are noted in basal ganglia, unchanged from prior exam   Vascular calcifications  Age-related cortical atrophy  Periventricular white matter hypodensity which is nonspecific although most compatible with chronic small vessel ischemic disease  VENTRICLES AND EXTRA-AXIAL SPACES:  Normal for the patient's age  VISUALIZED ORBITS AND PARANASAL SINUSES:  Unremarkable  CALVARIUM AND EXTRACRANIAL SOFT TISSUES:  Frontal scalp hematoma without calvarial fracture  There is a right perimedian frontal scalp fatty mass also noted which is unchanged, compatible with lipoma  Impression: No acute intracranial abnormality  Frontal scalp hematoma without calvarial fracture   Workstation performed: [id number]     Trauma - Ct Facial Bones Wo Contrast    Result Date: 3/14/2020  Narrative: CT FACIAL BONES WITHOUT INTRAVENOUS CONTRAST INDICATION:  Fall with facial trauma  COMPARISON: CT facial bones 9/15/2018 TECHNIQUE:  Axial CT images were obtained through the facial bones with additional sagittal and coronal reconstructions  Radiation dose length product (DLP) for this visit:  309 47 mGy-cm  This examination, like all CT scans performed in the [facility], was performed utilizing techniques to minimize radiation dose exposure, including the use of iterative reconstruction and automated exposure control  IMAGE QUALITY:  Diagnostic  FINDINGS: FACIAL BONES:   No facial bone fracture identified  Normal alignment of the temporomandibular joints  No lytic or blastic lesion  ORBITS:  Orbital globes, optic nerves, and extraocular muscles appear symmetric and normal  There is no evidence of retrobulbar mass, abscess, or hematoma  SINUSES:  Normal  SOFT TISSUES:  Frontal scalp hematoma  Atherosclerotic vascular calcifications noted at the carotid bifurcations bilaterally  Multilevel degenerative changes cervical spine  Impression: No facial bone fracture detected  Frontal scalp hematoma  Workstation performed: [id number]     Trauma - Ct Spine Cervical Wo Contrast    Result Date: 3/14/2020  Narrative: CT CERVICAL SPINE - WITHOUT CONTRAST INDICATION:   Fall with facial injuries  COMPARISON:  CT cervical spine 9/7/2017 TECHNIQUE:  CT examination of the cervical spine was performed without intravenous contrast   Contiguous axial images were obtained  Sagittal and coronal reconstructions were performed  Radiation dose length product (DLP) for this visit:  237 47 mGy-cm   This examination, like all CT scans performed in the [facility], was performed utilizing techniques to minimize radiation dose exposure, including the use of iterative  reconstruction and automated exposure control  IMAGE QUALITY:  Diagnostic  FINDINGS: ALIGNMENT:  Mild anterolisthesis C4 on C5 and C7 on T1, unchanged  No jumped facets  VERTEBRAL BODIES:  No fracture  DEGENERATIVE CHANGES:  Moderately advanced discogenic disease noted at C5-6 and C6-7 with small osteophytes and hypertrophic uncovertebral joint changes  Multilevel facet arthropathy  There is fusion of the C4-5 facet joint on the right  No critical central canal stenosis  PREVERTEBRAL AND PARASPINAL SOFT TISSUES:  There is a 5 mm partially calcified nodule seen in the right vallecula which appears unchanged in retrospect, presumably benign  THORACIC INLET:  Biapical pleural thickening  No pneumothorax  Impression: No cervical spine fracture or traumatic malalignment  Degenerative changes as above  Workstation performed: [id number]     Mri Abdomen W Wo Contrast And Mrcp    Result Date: 3/20/2020  Narrative: MRI OF THE ABDOMEN WITH AND WITHOUT CONTRAST WITH MRCP INDICATION:  80-year-old female with CT from earlier today demonstrating 2 8 cm lesion along the falciform ligament and questionable splenic flexure mass  COMPARISON: CT of the abdomen and pelvis from early this morning  TECHNIQUE:  The following pulse sequences were obtained:  Coronal and axial T2 with TE of 90 and 180 respectively, axial T2 with fat saturation, axial FIESTA fat-sat, axial T1-weighted in-and-out-of phase, axial DWI/ADC, pre-contrast axial T1 with fat saturation, post-contrast dynamic axial T1 with fat saturation at 20, 70, and 180 seconds, followed by coronal and 7 minute delayed axial T1 with fat saturation  3D MRCP images were obtained with radial thick slabs and projections  3D rendering was performed from the acquisition scanner  IV Contrast:  5 mL of gadobutrol injection (MULTI-DOSE) FINDINGS: LOWER CHEST:   Unremarkable  LIVER: Normal in size, shape and signal   2 5 x 3 2 cm poorly marginated mass in the left lobe, at the junction of the medial and lateral segments  Mass has T1 hypointensity and restricted diffusion and is isointense on T2-weighted images  It has gradual progressive and persistent enhancement    Dilatation of bile ducts in the lateral segment of the left lobe, distal to the mass  The hepatic veins and portal veins are patent  BILE DUCTS:  Dilatation of bile ducts in the lateral segment of the left lobe as described above  Otherwise, no intrahepatic or extrahepatic bile duct dilatation  No evidence of choledocholithiasis  GALLBLADDER:  Multiple gallstones  Gallbladder wall normal in thickness and signal   No pericholecystic inflammatory changes  PANCREAS:  Normal  No main pancreatic ductal dilation  ADRENAL GLANDS:  Normal  SPLEEN:  Normal  KIDNEYS/PROXIMAL URETERS:  No hydroureteronephrosis  No suspicious renal mass  BOWEL:   Duodenum and imaged portions of the small intestine unremarkable  Dilated proximal colon with the cecum 9 5 cm in diameter, similar in appearance to the CT from earlier this morning  More distally, the imaged portion of the colon normal in caliber  PERITONEAL CAVITY/RETROPERITONEUM:  No mass or ascites  LYMPH NODES:  No abdominal lymphadenopathy  VASCULAR STRUCTURES:  Unremarkable  No aortic aneurysm  ABDOMINAL WALL:  Unremarkable  OSSEOUS STRUCTURES:  Normal marrow signal and enhancement  No evidence of recent fracture, mass or marrow replacing process  Impression: 1   2 5 x 3 2 cm poorly marginated mass in the left lobe of the liver, causing obstruction of bile ducts in the lateral segment of the left lobe  2   Cholelithiasis without evidence of acute cholecystitis  3   Distention of proximal colon with dilated cecum, measuring 9 5 cm in diameter  The study was marked in EPIC for significant notification  Workstation performed: [id number]     Ct Recon Only Lumbar Spine    Result Date: 3/21/2020  Narrative: CT LUMBAR SPINE INDICATION:   low back pain s/p fall 3/14  COMPARISON:  CT chest, abdomen and pelvis from the same date  TECHNIQUE: Axial CT examination of the lumbar spine was obtained utilizing reconstructed images from CT of the chest, abdomen and pelvis performed the same day  Images were reformatted in the sagittal and coronal planes  This examination, like all CT scans performed in the [facility], was performed utilizing techniques to minimize radiation dose exposure, including the use of iterative reconstruction and automated exposure control  FINDINGS: ALIGNMENT: Grade 1 L4 on L5 anterolisthesis appears secondary to facet arthropathy at this level  There is also a mild thoracolumbar dextroscoliosis with apex at L2  VERTEBRAL BODIES: No fracture  No acute osseous abnormality  DEGENERATIVE CHANGES: Multilevel spondylotic changes identified  At L2-3 there is loss of disc height with vacuum phenomenon and disc osteophyte complex resulting in moderate central canal encroachment  Mild to moderate left foraminal encroachment evident  At L3-4 there is a diffuse disc bulge and bilateral facet hypertrophy resulting in severe left foraminal stenosis and possible left L3 impingement  At L4-5 there is anterolisthesis as above resulting in unroofing of the dorsal disc  There is also loss of disc height with vacuum phenomenon noted  Severe bilateral foraminal encroachment raises suspicion for bilateral L4 radiculopathy  Correlate clinically  PREVERTEBRAL AND PARASPINAL SOFT TISSUES: Normal   No hematoma  Impression: 1  Multilevel degenerative changes involving the lumbar spine with severe bilateral L4-5 foraminal encroachment noted as well as severe left L3-4 foraminal narrowing  Correlate clinically for bilateral L4 radiculopathy as well as left L3 radiculopathy, respectively  2  Grade 1 L4 on L5 anterolisthesis secondary to severe facet arthropathy at this level  3  Mild dextroscoliosis noted  Workstation performed: [id number]     Ir Image Guided Biopsy/aspiration Liver    Result Date: 3/25/2020  Narrative: INDICATION: New left hepatic lobe mass with left hepatic artery dilatation   PROCEDURE: 1   CT-guided percutaneous liver mass biopsy     Impression: Successful ultrasound guided percutaneous core biopsy of liver mass  _________________________________________________________________ COMPARISON: CT abdomen 3/20/2020  PROCEDURE DETAILS: Operators: Dr Miko Briseno, [facility] attending, performed the procedure  Anesthesia: Moderate sedation was provided throughout a total intra-service time of 30 minutes during which the patient's hemodynamic parameters were continuously monitored by an independent trained radiology nurse  1% lidocaine was injected in the skin  and subcutaneous tissues overlying the access site  Medications: 1% lidocaine, fentanyl, Versed  Contrast: None  Fluoroscopy time: 0 minutes This examination, like all CT scans performed in the [facility], was performed utilizing techniques to minimize radiation dose exposure, including the use of iterative reconstruction and automated exposure control  COMMENTS: Following the discussion of the risks, benefits and alternatives to the procedure, written informed consent was obtained from the patient  The patient was placed supine on the imaging table  Limited right upper quadrant ultrasound was performed to identify the left hepatic lobe mass but given its infiltrative nature it was not confidently delineated  The patient was then transferred to the [place] [id number]  After axial images were obtained through the region of interest, an area of skin was then marked, prepped and draped in the usual sterile fashion  A preprocedure timeout was then performed per [facility]'s protocol  After local anesthesia, a 17 gauge coaxial introducer needle was advanced using intermittent CT guidance into the left hepatic lobe mass  An 18-gauge coring needle was advanced through the introducer needle  Multiple 18-gauge core biopsies were obtained  and submitted to pathology for review  D-Stat was used for hemostasis  The patient tolerated the procedure without immediate complication   Workstation performed: [id number]     Ct Abdomen Pelvis With Contrast    Result Date: 3/20/2020  Narrative: CT ABDOMEN AND PELVIS WITH IV CONTRAST INDICATION:   abdominal pain and distention  COMPARISON:  None  TECHNIQUE:  CT examination of the abdomen and pelvis was performed  Axial, sagittal, and coronal 2D reformatted images were created from the source data and submitted for interpretation  Radiation dose length product (DLP) for this visit:  431 mGy-cm   This examination, like all CT scans performed in the [facility], was performed utilizing techniques to minimize radiation dose exposure, including the use of iterative reconstruction and automated exposure control  IV Contrast:  100 mL of iohexol (OMNIPAQUE) Enteric Contrast:  Enteric contrast was not administered  FINDINGS: ABDOMEN LOWER CHEST:  No clinically significant abnormality identified in the visualized lower chest  LIVER/BILIARY TREE:  Indeterminant 2 8 cm mildly hypodense lesion along the falciform ligament  Upstream biliary ductal dilatation  Brian Edmondson GALLBLADDER:  There are gallstone(s) within the gallbladder, without pericholecystic inflammatory changes  SPLEEN:  Unremarkable  PANCREAS:  Unremarkable  ADRENAL GLANDS:  Unremarkable  KIDNEYS/URETERS:  One or more simple renal cyst(s) is noted  Otherwise unremarkable kidneys  No hydronephrosis  STOMACH AND BOWEL:  There is colonic diverticulosis without evidence of acute diverticulitis  Prominent stool retention throughout the ascending and transverse colon  Focal abrupt narrowing at the splenic flexure with mild thickening (series 2, image 32, series 601, image 86)  APPENDIX:  No findings to suggest appendicitis  ABDOMINOPELVIC CAVITY:  No ascites or free intraperitoneal air  No lymphadenopathy  VESSELS:  Atherosclerotic changes are present  No evidence of aneurysm  PELVIS REPRODUCTIVE ORGANS:  Unremarkable for patient's age  URINARY BLADDER:  Unremarkable  ABDOMINAL WALL/INGUINAL REGIONS:  Unremarkable   OSSEOUS STRUCTURES:  No acute fracture or destructive osseous lesion  Mild anterior listhesis of L4 and L5  Mild superior endplate depression at J23  Impression: 2 8 cm vague low-attenuation lesion within the right hepatic lobe along the falciform ligament with upstream biliary ductal dilatation concerning for underlying neoplasm  MR abdomen with contrast recommended for further evaluation  Prominent stool retention leading to focal segment of abrupt narrowing and mild thickening at the splenic flexure, which may either represent focal peristalsis versus an underlying lesion resulting in a mild partial large bowel obstruction  Findings discussed with Dr Williams Lynne at 2:23 AM, 3/20/2020 Workstation performed: [id number]       REVIEW OF SYSTEMS:    CONSTITUTIONAL: Denies any fever, chills, rigors, and weight loss  HEENT: No earache or tinnitus  Denies hearing loss or visual disturbances  CARDIOVASCULAR: No chest pain or palpitations  RESPIRATORY: Denies any cough, hemoptysis, shortness of breath or dyspnea on exertion  GASTROINTESTINAL: As noted in the History of Present Illness  GENITOURINARY: No problems with urination  Denies any hematuria or dysuria  NEUROLOGIC: No dizziness or vertigo, denies headaches  MUSCULOSKELETAL: Denies any muscle or joint pain  SKIN: Denies skin rashes or itching  ENDOCRINE: Denies excessive thirst  Denies intolerance to heat or cold  PSYCHOSOCIAL: Denies depression or anxiety  Denies any recent memory loss  VIRTUAL VISIT DISCLAIMER    Naveen Garay acknowledges that she has consented to an online visit or consultation  she understands that the online visit is based solely on information provided by her, and that in the absence of a face-to-face physical evaluation by the physician the diagnosis she receives is both limited and provisional in terms of accuracy and completeness  This is not intended to replace a full medical face-to-face evaluation by the physician   Naveen Garay understands and accepts these terms  I spent 15 minutes with the patient during this visit

## 2020-03-30 NOTE — LETTER
March 30, 2020     Demetri Hernandez, [address]  [address] [address]    Patient: Moody Quiros   YOB: 1929   Date of Visit: 3/30/2020       Dear Dr Mariaelena Keating: Thank you for referring Ari Henley to me for evaluation  Below are my notes for this consultation  If you have questions, please do not hesitate to call me  I look forward to following your patient along with you  Sincerely,        Alyssa Hicks MD        CC: No Recipients  Alyssa Hicks MD  3/30/2020  1:44 PM  Sign at close encounter    Virtual Regular Visit    Problem List Items Addressed This Visit     None      Visit Diagnoses     Adenocarcinoma determined by biopsy of liver [facility])    -  Primary    Relevant Orders    Ambulatory referral to Hematology / Oncology    Slow transit constipation        Relevant Medications    senna-docusate sodium (SENOKOT S) 8 6-50 mg per tablet    polyethylene glycol (GLYCOLAX) powder    Chronic anticoagulation        Relevant Medications    apixaban (Eliquis) 5 mg    PAF (paroxysmal atrial fibrillation) (HCC)        Relevant Medications    apixaban (Eliquis) 5 mg               Reason for visit is hospital follow-up for liver adenoma carcinoma and constipation    Encounter provider Alyssa Hicks MD    Provider located at [address] [address] [phone]      Recent Visits  No visits were found meeting these conditions     Showing recent visits within past 7 days and meeting all other requirements     Today's Visits  Date Type Provider Dept   03/30/20 Telephone [facility]   03/30/20 Telemedicine Alyssa Hicks MD  [facility]   Showing today's visits and meeting all other requirements     Future Appointments  Date Type Provider Dept   03/30/20 Telephone [facility]   Showing future appointments within next 150 days and meeting all other requirements        The patient was identified by name and date of birth  Bebe Melgar was informed that this is a telemedicine visit and that the visit is being conducted through [address] and patient was informed that this is not a secure, HIPAA-complaint platform  she agrees to proceed     My office door was closed  No one else was in the room  She acknowledged consent and understanding of privacy and security of the video platform  The patient has agreed to participate and understands they can discontinue the visit at any time  Patient is aware this is a billable service  Subjective  Bebe Melgar is a 80 y o  female was recently admitted to the hospital last week for abdominal pain  At that time, CT scan showed a narrowing at the splenic flexure with stool retention as well as a 2 8 cm right hepatic lobe liver mass which was suspicious for metastatic colon cancer  Patient proceeded to get a colonoscopy  With an adequate prep, it was determined that there was no intraluminal mass or stricture  A lot of stool backup noted on CT scan so bowel regimen was started with improvement of her constipation  AFP and CEA were elevated  Because the colonoscopy was negative, a liver biopsy was performed prior to discharge  The liver biopsy results are in, adenocarcinoma with staining patterns leaning towards cholangiocarcinoma, pancreaticobiliary region, or upper GI  Since discharge, patient states that she has been feeling well  She is moving her bowels  Denies any nausea vomiting or abdominal pains  She does have a little bit more of gurgling since starting prune juice and fiber  No bleeding  Appetite is fair  ASSESSMENT AND PLAN:      1   Adenocarcinoma determined by biopsy of liver [facility])  68-year-old female with new diagnosis of adenocarcinoma of the liver, unclear if this is primary liver HCC versus secondary metastatic adenocarcinoma from upper GI or pancreas, biliary region  Negative colonoscopy last week  I had a long discussion with the patient and daughter Marly Lewis  I answer other questions regarding what the outcomes were  I explained that at this point, because we do not yet know what the primary tumor is from, it would be hard to discuss any outcome events  The feel that she has a good quality of life and this is the most important thing for them  At this point, I advised that we proceed with an upper endoscopy to rule out upper GI sources  AFP and CEA elevated, CA 19-9 normal     - Schedule EGD @ [address]  [facility] [address] [facility]  - Referral to oncology  - Pt's daughter has reached out to Dr Bulmaro Meza - ultimately, pending primary tumor, decisions regarding resection, RFA, chemo, etc can be determined  They understand and at this point, would like to continue with the diagnostic work up  - Ambulatory referral to Hematology / Oncology; Future    2  Slow transit constipation  CT showing big stool burden  A lot of cramping from constipation which initially brought her to the ER last week  Regardless, at this point she is moving her bowels and feeling better  Continue the Senokot and Glycolax  I advised that if she is having less bowel movements, she can increase the MiraLax to twice a day  Additionally, continue high fiber diet, prune juice, increased water intake  - senna-docusate sodium (SENOKOT S) 8 6-50 mg per tablet; Take 1 tablet by mouth daily  Dispense: 9 tablet; Refill: 0  - polyethylene glycol (GLYCOLAX) powder; Take 17 g by mouth daily    3  Chronic anticoagulation  One Eliquis at baseline  Patient will need to hold this prior to EGD for 2 days  Risks and benefits of holding the anticoagulation prior to the procedure discussed with the patient and daughter Marly Lewis  They understand the risks of embolic events while holding the anticoagulation  Patient understands the risks of bleeding if continuation of the anticoagulation is chosen  - hold Eliquis, will need cardiac clearance    4  PAF (paroxysmal atrial fibrillation) ([facility])    - takes apixaban (Eliquis) 5 mg;  Take 1 tablet (5 mg total) by mouth 2 (two) times a day        Followup Appointment:  Pending above workup  ______________________________________________________________________      Historical Information   Past Medical History:   Diagnosis Date    Acute myocardial infarction ([address] )     Aortic valve disease     Atrial fibrillation ([facility] )     CAD (coronary artery disease)     Cancer (HCC)     skin cancer removed from left leg    Cellulitis      AND ABSCESS    Cervical stenosis of spine     CHF (congestive heart failure) ([facility])     Cholecystitis     Closed Colles' fracture     OF THE LEFT WRIST; LAST ASSESSED: 5/2/14    Closed fracture of radius     LAST ASSESSED: 2/19/14    Diverticulosis     Dyspnea     Hair loss disorder     Hematuria     History of being hospitalized     1/5/12-1/14/12 [facility] AMBULATORY CARE [facility] PROCEDURES: 1) REDO STERNOTOMY AND AORTIC VALVE REPLACEMENT WITH A 19-MM REBA-PETERS BOVINIE PERICARDIAL MAGNA EASE VALVE 2) TRANSESOPHAGEAL ECHOCARDIOGRAM     Hypercholesterolemia     Hypertension     Incomplete bladder emptying     Leg wound, left     Malaise and fatigue     Renal disorder     Sick sinus syndrome ([address] )     Urinary tract infection      Past Surgical History:   Procedure Laterality Date    AORTIC VALVE REPLACEMENT  01/2012    HEART VALVE    CARDIAC SURGERY      CATARACT EXTRACTION W/  INTRAOCULAR LENS IMPLANT  08/26/2010    Shanita Garcia MD; PHACOEMULISIFICATION; INSERTION INTRAOCULAR LENS OD    COLONOSCOPY  03/08/2007    CORONARY ARTERY BYPASS GRAFT  01/2005    IR IMAGE GUIDED BIOPSY/ASPIRATION LIVER  3/25/2020    LEG SURGERY      VA KNEE SCOPE,MED/LAT MENISECTOMY Left 9/23/2019    Procedure: ARTHROSCOPY KNEE, LEFT PARTIAL MEDIAL MENISCECTOMY;  Surgeon: Baldemar Zapata MD;  Location:  MAIN OR;  Service: Orthopedics     Social History Substance and Sexual Activity   Alcohol Use Not Currently    Frequency: Never    Comment: social     Social History     Substance and Sexual Activity   Drug Use No     Social History     Tobacco Use   Smoking Status Former Smoker   Smokeless Tobacco Never Used   Tobacco Comment    QUIT 40 YEARS AGO ALSO NEVER A SMOKER AS PER [employer]     Family History   Problem Relation Age of Onset    Diabetes Mother         MELLITUS    Hypertension Mother     Heart disease Mother     Prostate cancer Father     Dementia Brother     Heart disease Maternal Grandmother     Hypertension Maternal Grandmother     Heart disease Maternal Grandfather     Hypertension Family     Osteoporosis Family     Substance Abuse Neg Hx     Mental illness Neg Hx        Meds/Allergies       Current Outpatient Medications:     amLODIPine (NORVASC) 2 5 mg tablet    apixaban (Eliquis) 5 mg    atorvastatin (LIPITOR) 20 mg tablet    Coenzyme Q10 (COQ10) 100 MG CAPS    conjugated estrogens (PREMARIN) vaginal cream    gabapentin (NEURONTIN) 100 mg capsule    Multiple Vitamins-Minerals (OCUVITE EXTRA) TABS    Omega-3 1000 MG CAPS    potassium chloride (K-DUR,KLOR-CON) 10 mEq tablet    senna-docusate sodium (SENOKOT S) 8 6-50 mg per tablet    torsemide (DEMADEX) 20 mg tablet    polyethylene glycol (GLYCOLAX) powder    Allergies   Allergen Reactions    Heparin      Annotation - [id number]: LOW PLATELETS    Phenazopyridine      As per daughter "Not appropriate for pt due to heart condition"       PHYSICAL EXAM:    Height 5' 2" (1 575 m), weight 54 4 kg (120 lb), not currently breastfeeding  Body mass index is 21 95 kg/m²  Appearance and vitals taken from home devices    General Appearance:   Alert, cooperative, no distress   HEENT:  Normocephalic, atraumatic, anicteric  Neck supple, symmetrical, trachea midline  Lungs:   Equal chest rise and unlabored breathing, normal effort, no coughing     Cardiovascular:   No visualized JVD or lower extremity edema  Abdomen:   No abdominal distension  Skin:   No jaundice, rashes, or lesions  Musculoskeletal:   Normal range of motion visualized  10 feet gait without difficulty and without assistive device  Psych:  Normal affect and normal insight  Neuro:  Alert and appropriate  Ox3  Slightly hard of hearing  Lab Results:   Lab Results   Component Value Date    WBC 7 06 03/24/2020    HGB 9 1 (L) 03/24/2020    HCT 28 2 (L) 03/24/2020    MCV 88 03/24/2020     03/24/2020     Lab Results   Component Value Date     08/04/2015    K 3 1 (L) 03/24/2020     03/24/2020    CO2 28 03/24/2020    ANIONGAP 11 08/04/2015    BUN 12 03/24/2020    CREATININE 1 02 03/24/2020    GLUCOSE 92 08/04/2015    GLUF 85 09/16/2019    CALCIUM 8 3 03/24/2020    AST 19 03/23/2020    ALT 17 03/23/2020    ALKPHOS 99 03/23/2020    PROT 6 9 08/04/2015    BILITOT 0 53 08/04/2015    EGFR 49 03/24/2020     No results found for: IRON, TIBC, FERRITIN  Lab Results   Component Value Date    LIPASE 143 03/20/2020       Radiology Results:   Ct Chest Abdomen Pelvis Wo Contrast    Result Date: 3/20/2020  Narrative: CT CHEST, ABDOMEN AND PELVIS WITHOUT IV CONTRAST INDICATION:   splenic flexure mass, liver lesion, r/o metastatic disease to chest  COMPARISON:  CT abdomen pelvis from earlier today  MRI abdomen from earlier today  TECHNIQUE: CT examination of the chest, abdomen and pelvis was performed without intravenous contrast   Axial, sagittal, and coronal 2D reformatted images were created from the source data and submitted for interpretation  Radiation dose length product (DLP) for this visit:  580 mGy-cm   This examination, like all CT scans performed in the [facility], was performed utilizing techniques to minimize radiation dose exposure, including the use of iterative reconstruction and automated exposure control  Enteric contrast was administered   FINDINGS: CHEST LUNGS:  Mild biapical pleural/parenchymal scarring  PLEURA:  Unremarkable  HEART/GREAT VESSELS:  41 mm ascending aortic aneurysm  Atherosclerotic calcifications of the aorta and coronary arteries  Cardiomegaly  MEDIASTINUM AND MEHNAZ:  Unremarkable  CHEST WALL AND LOWER NECK:   Unremarkable  ABDOMEN LIVER/BILIARY TREE:  Left hepatic mass with more peripheral left hepatic biliary dilation better appreciated on this MRI from earlier today  Please see that report  GALLBLADDER:  There are gallstone(s) within the gallbladder, without pericholecystic inflammatory changes  SPLEEN:  Unremarkable  PANCREAS:  Unremarkable  ADRENAL GLANDS:  Unremarkable  KIDNEYS/URETERS:  No hydronephrosis  IV contrast within the renal collecting systems from the earlier CT  STOMACH AND BOWEL:  Prominent stool in the proximal and mid colon without a discrete mass  No bowel obstruction  APPENDIX:  No findings to suggest appendicitis  ABDOMINOPELVIC CAVITY:  No ascites or free intraperitoneal air  No lymphadenopathy  VESSELS:  Unremarkable for patient's age  PELVIS REPRODUCTIVE ORGANS:  Unremarkable for patient's age  URINARY BLADDER:  Unremarkable  ABDOMINAL WALL/INGUINAL REGIONS:  Persistent small bowel containing right inguinal hernia without complication  No bowel obstruction  OSSEOUS STRUCTURES:  No acute fracture or destructive osseous lesion  Impression: Prominent stool throughout the colon without a colonic mass    No bowel obstruction  Known left hepatic lesion and more peripheral left hepatic biliary dilation is better appreciated on the MRI from earlier today  Please see that report Unchanged small bowel containing right inguinal hernia without complication  No metastatic disease to the chest  Workstation performed: [id number]     Xr Knee 4+ Views Left Injury    Result Date: 3/15/2020  Narrative: LEFT KNEE INDICATION:   Fall  COMPARISON:  9/4/2019 x-rays VIEWS:  XR KNEE 4+ VW LEFT INJURY Images: 4 FINDINGS: There is no acute fracture or dislocation   There is no joint effusion  Mild tricompartmental degenerative arthritis No lytic or blastic lesions are seen  Soft tissues are unremarkable  Scattered vascular calcifications     Impression: No acute osseous abnormality  Findings are stable Workstation performed: [id number]     Xr Knee 4+ Views Right Injury    Result Date: 3/15/2020  Narrative: RIGHT KNEE INDICATION:   Fall  COMPARISON:  9/15/2018 VIEWS:  XR KNEE 4+ VW RIGHT INJURY Images: 4 FINDINGS: There is no acute fracture or dislocation  There is no joint effusion  Persistent mild tricompartmental degenerative arthritis No lytic or blastic lesions are seen  Soft tissues are unremarkable  Persistent vascular calcifications     Impression: No acute osseous abnormality  Similar to previous study Workstation performed: [id number]     Trauma - Ct Head Wo Contrast    Result Date: 3/14/2020  Narrative: CT BRAIN - WITHOUT CONTRAST INDICATION: Fall, facial trauma  COMPARISON:  CT head 9/15/2018 TECHNIQUE:  CT examination of the brain was performed  In addition to axial images, coronal 2D reformatted images were created and submitted for interpretation  Radiation dose length product (DLP) for this visit:  800 87 mGy-cm   This examination, like all CT scans performed in the [facility], was performed utilizing techniques to minimize radiation dose exposure, including the use of iterative  reconstruction and automated exposure control  IMAGE QUALITY:  Diagnostic  FINDINGS: PARENCHYMA:  No intracranial mass, mass effect or midline shift  No CT signs of acute infarction  No acute parenchymal hemorrhage  Chronic lacunar infarct(s) are noted in basal ganglia, unchanged from prior exam   Vascular calcifications  Age-related cortical atrophy  Periventricular white matter hypodensity which is nonspecific although most compatible with chronic small vessel ischemic disease  VENTRICLES AND EXTRA-AXIAL SPACES:  Normal for the patient's age   VISUALIZED ORBITS AND PARANASAL SINUSES:  Unremarkable  CALVARIUM AND EXTRACRANIAL SOFT TISSUES:  Frontal scalp hematoma without calvarial fracture  There is a right perimedian frontal scalp fatty mass also noted which is unchanged, compatible with lipoma  Impression: No acute intracranial abnormality  Frontal scalp hematoma without calvarial fracture  Workstation performed: [id number]     Trauma - Ct Facial Bones Wo Contrast    Result Date: 3/14/2020  Narrative: CT FACIAL BONES WITHOUT INTRAVENOUS CONTRAST INDICATION:  Fall with facial trauma  COMPARISON: CT facial bones 9/15/2018 TECHNIQUE:  Axial CT images were obtained through the facial bones with additional sagittal and coronal reconstructions  Radiation dose length product (DLP) for this visit:  309 47 mGy-cm  This examination, like all CT scans performed in the [facility], was performed utilizing techniques to minimize radiation dose exposure, including the use of iterative reconstruction and automated exposure control  IMAGE QUALITY:  Diagnostic  FINDINGS: FACIAL BONES:   No facial bone fracture identified  Normal alignment of the temporomandibular joints  No lytic or blastic lesion  ORBITS:  Orbital globes, optic nerves, and extraocular muscles appear symmetric and normal  There is no evidence of retrobulbar mass, abscess, or hematoma  SINUSES:  Normal  SOFT TISSUES:  Frontal scalp hematoma  Atherosclerotic vascular calcifications noted at the carotid bifurcations bilaterally  Multilevel degenerative changes cervical spine  Impression: No facial bone fracture detected  Frontal scalp hematoma  Workstation performed: [id number]     Trauma - Ct Spine Cervical Wo Contrast    Result Date: 3/14/2020  Narrative: CT CERVICAL SPINE - WITHOUT CONTRAST INDICATION:   Fall with facial injuries  COMPARISON:  CT cervical spine 9/7/2017 TECHNIQUE:  CT examination of the cervical spine was performed without intravenous contrast   Contiguous axial images were obtained  Sagittal and coronal reconstructions were performed  Radiation dose length product (DLP) for this visit:  237 47 mGy-cm   This examination, like all CT scans performed in the [facility], was performed utilizing techniques to minimize radiation dose exposure, including the use of iterative  reconstruction and automated exposure control  IMAGE QUALITY:  Diagnostic  FINDINGS: ALIGNMENT:  Mild anterolisthesis C4 on C5 and C7 on T1, unchanged  No jumped facets  VERTEBRAL BODIES:  No fracture  DEGENERATIVE CHANGES:  Moderately advanced discogenic disease noted at C5-6 and C6-7 with small osteophytes and hypertrophic uncovertebral joint changes  Multilevel facet arthropathy  There is fusion of the C4-5 facet joint on the right  No critical central canal stenosis  PREVERTEBRAL AND PARASPINAL SOFT TISSUES:  There is a 5 mm partially calcified nodule seen in the right vallecula which appears unchanged in retrospect, presumably benign  THORACIC INLET:  Biapical pleural thickening  No pneumothorax  Impression: No cervical spine fracture or traumatic malalignment  Degenerative changes as above  Workstation performed: [id number]     Mri Abdomen W Wo Contrast And Mrcp    Result Date: 3/20/2020  Narrative: MRI OF THE ABDOMEN WITH AND WITHOUT CONTRAST WITH MRCP INDICATION:  26-year-old female with CT from earlier today demonstrating 2 8 cm lesion along the falciform ligament and questionable splenic flexure mass  COMPARISON: CT of the abdomen and pelvis from early this morning  TECHNIQUE:  The following pulse sequences were obtained:  Coronal and axial T2 with TE of 90 and 180 respectively, axial T2 with fat saturation, axial FIESTA fat-sat, axial T1-weighted in-and-out-of phase, axial DWI/ADC, pre-contrast axial T1 with fat saturation, post-contrast dynamic axial T1 with fat saturation at 20, 70, and 180 seconds, followed by coronal and 7 minute delayed axial T1 with fat saturation   3D MRCP images were obtained with radial thick slabs and projections  3D rendering was performed from the acquisition scanner  IV Contrast:  5 mL of gadobutrol injection (MULTI-DOSE) FINDINGS: LOWER CHEST:   Unremarkable  LIVER: Normal in size, shape and signal   2 5 x 3 2 cm poorly marginated mass in the left lobe, at the junction of the medial and lateral segments  Mass has T1 hypointensity and restricted diffusion and is isointense on T2-weighted images  It has gradual progressive and persistent enhancement  Dilatation of bile ducts in the lateral segment of the left lobe, distal to the mass  The hepatic veins and portal veins are patent  BILE DUCTS:  Dilatation of bile ducts in the lateral segment of the left lobe as described above  Otherwise, no intrahepatic or extrahepatic bile duct dilatation  No evidence of choledocholithiasis  GALLBLADDER:  Multiple gallstones  Gallbladder wall normal in thickness and signal   No pericholecystic inflammatory changes  PANCREAS:  Normal  No main pancreatic ductal dilation  ADRENAL GLANDS:  Normal  SPLEEN:  Normal  KIDNEYS/PROXIMAL URETERS:  No hydroureteronephrosis  No suspicious renal mass  BOWEL:   Duodenum and imaged portions of the small intestine unremarkable  Dilated proximal colon with the cecum 9 5 cm in diameter, similar in appearance to the CT from earlier this morning  More distally, the imaged portion of the colon normal in caliber  PERITONEAL CAVITY/RETROPERITONEUM:  No mass or ascites  LYMPH NODES:  No abdominal lymphadenopathy  VASCULAR STRUCTURES:  Unremarkable  No aortic aneurysm  ABDOMINAL WALL:  Unremarkable  OSSEOUS STRUCTURES:  Normal marrow signal and enhancement  No evidence of recent fracture, mass or marrow replacing process  Impression: 1   2 5 x 3 2 cm poorly marginated mass in the left lobe of the liver, causing obstruction of bile ducts in the lateral segment of the left lobe  2   Cholelithiasis without evidence of acute cholecystitis   3   Distention of proximal colon with dilated cecum, measuring 9 5 cm in diameter  The study was marked in EPIC for significant notification  Workstation performed: [id number]     Ct Recon Only Lumbar Spine    Result Date: 3/21/2020  Narrative: CT LUMBAR SPINE INDICATION:   low back pain s/p fall 3/14  COMPARISON:  CT chest, abdomen and pelvis from the same date  TECHNIQUE: Axial CT examination of the lumbar spine was obtained utilizing reconstructed images from CT of the chest, abdomen and pelvis performed the same day  Images were reformatted in the sagittal and coronal planes  This examination, like all CT scans performed in the [facility], was performed utilizing techniques to minimize radiation dose exposure, including the use of iterative reconstruction and automated exposure control  FINDINGS: ALIGNMENT: Grade 1 L4 on L5 anterolisthesis appears secondary to facet arthropathy at this level  There is also a mild thoracolumbar dextroscoliosis with apex at L2  VERTEBRAL BODIES: No fracture  No acute osseous abnormality  DEGENERATIVE CHANGES: Multilevel spondylotic changes identified  At L2-3 there is loss of disc height with vacuum phenomenon and disc osteophyte complex resulting in moderate central canal encroachment  Mild to moderate left foraminal encroachment evident  At L3-4 there is a diffuse disc bulge and bilateral facet hypertrophy resulting in severe left foraminal stenosis and possible left L3 impingement  At L4-5 there is anterolisthesis as above resulting in unroofing of the dorsal disc  There is also loss of disc height with vacuum phenomenon noted  Severe bilateral foraminal encroachment raises suspicion for bilateral L4 radiculopathy  Correlate clinically  PREVERTEBRAL AND PARASPINAL SOFT TISSUES: Normal   No hematoma  Impression: 1   Multilevel degenerative changes involving the lumbar spine with severe bilateral L4-5 foraminal encroachment noted as well as severe left L3-4 foraminal narrowing  Correlate clinically for bilateral L4 radiculopathy as well as left L3 radiculopathy, respectively  2  Grade 1 L4 on L5 anterolisthesis secondary to severe facet arthropathy at this level  3  Mild dextroscoliosis noted  Workstation performed: [id number]     Ir Image Guided Biopsy/aspiration Liver    Result Date: 3/25/2020  Narrative: INDICATION: New left hepatic lobe mass with left hepatic artery dilatation  PROCEDURE: 1   CT-guided percutaneous liver mass biopsy     Impression: Successful ultrasound guided percutaneous core biopsy of liver mass  _________________________________________________________________ COMPARISON: CT abdomen 3/20/2020  PROCEDURE DETAILS: Operators: Dr Lizeth Fernandes, [address] [place] [address] attending, performed the procedure  Anesthesia: Moderate sedation was provided throughout a total intra-service time of 30 minutes during which the patient's hemodynamic parameters were continuously monitored by an independent trained radiology nurse  1% lidocaine was injected in the skin  and subcutaneous tissues overlying the access site  Medications: 1% lidocaine, fentanyl, Versed  Contrast: None  Fluoroscopy time: 0 minutes This examination, like all CT scans performed in the [facility], was performed utilizing techniques to minimize radiation dose exposure, including the use of iterative reconstruction and automated exposure control  COMMENTS: Following the discussion of the risks, benefits and alternatives to the procedure, written informed consent was obtained from the patient  The patient was placed supine on the imaging table  Limited right upper quadrant ultrasound was performed to identify the left hepatic lobe mass but given its infiltrative nature it was not confidently delineated  The patient was then transferred to the [place] [address]  After axial images were obtained through the region of interest, an area of skin was then marked, prepped and draped in the usual sterile fashion    A preprocedure timeout was then performed per Maria Victoria Medeiros's protocol  After local anesthesia, a 17 gauge coaxial introducer needle was advanced using intermittent CT guidance into the left hepatic lobe mass  An 18-gauge coring needle was advanced through the introducer needle  Multiple 18-gauge core biopsies were obtained  and submitted to pathology for review  D-Stat was used for hemostasis  The patient tolerated the procedure without immediate complication  Workstation performed: [id number]     Ct Abdomen Pelvis With Contrast    Result Date: 3/20/2020  Narrative: CT ABDOMEN AND PELVIS WITH IV CONTRAST INDICATION:   abdominal pain and distention  COMPARISON:  None  TECHNIQUE:  CT examination of the abdomen and pelvis was performed  Axial, sagittal, and coronal 2D reformatted images were created from the source data and submitted for interpretation  Radiation dose length product (DLP) for this visit:  431 mGy-cm   This examination, like all CT scans performed in the [facility], was performed utilizing techniques to minimize radiation dose exposure, including the use of iterative reconstruction and automated exposure control  IV Contrast:  100 mL of iohexol (OMNIPAQUE) Enteric Contrast:  Enteric contrast was not administered  FINDINGS: ABDOMEN LOWER CHEST:  No clinically significant abnormality identified in the visualized lower chest  LIVER/BILIARY TREE:  Indeterminant 2 8 cm mildly hypodense lesion along the falciform ligament  Upstream biliary ductal dilatation  Jose Guise GALLBLADDER:  There are gallstone(s) within the gallbladder, without pericholecystic inflammatory changes  SPLEEN:  Unremarkable  PANCREAS:  Unremarkable  ADRENAL GLANDS:  Unremarkable  KIDNEYS/URETERS:  One or more simple renal cyst(s) is noted  Otherwise unremarkable kidneys  No hydronephrosis  STOMACH AND BOWEL:  There is colonic diverticulosis without evidence of acute diverticulitis   Prominent stool retention throughout the ascending and transverse colon  Focal abrupt narrowing at the splenic flexure with mild thickening (series 2, image 32, series 601, image 86)  APPENDIX:  No findings to suggest appendicitis  ABDOMINOPELVIC CAVITY:  No ascites or free intraperitoneal air  No lymphadenopathy  VESSELS:  Atherosclerotic changes are present  No evidence of aneurysm  PELVIS REPRODUCTIVE ORGANS:  Unremarkable for patient's age  URINARY BLADDER:  Unremarkable  ABDOMINAL WALL/INGUINAL REGIONS:  Unremarkable  OSSEOUS STRUCTURES:  No acute fracture or destructive osseous lesion  Mild anterior listhesis of L4 and L5  Mild superior endplate depression at D46  Impression: 2 8 cm vague low-attenuation lesion within the right hepatic lobe along the falciform ligament with upstream biliary ductal dilatation concerning for underlying neoplasm  MR abdomen with contrast recommended for further evaluation  Prominent stool retention leading to focal segment of abrupt narrowing and mild thickening at the splenic flexure, which may either represent focal peristalsis versus an underlying lesion resulting in a mild partial large bowel obstruction  Findings discussed with Dr Annette Toth at 2:23 AM, 3/20/2020 Workstation performed: [id number]       REVIEW OF SYSTEMS:    CONSTITUTIONAL: Denies any fever, chills, rigors, and weight loss  HEENT: No earache or tinnitus  Denies hearing loss or visual disturbances  CARDIOVASCULAR: No chest pain or palpitations  RESPIRATORY: Denies any cough, hemoptysis, shortness of breath or dyspnea on exertion  GASTROINTESTINAL: As noted in the History of Present Illness  GENITOURINARY: No problems with urination  Denies any hematuria or dysuria  NEUROLOGIC: No dizziness or vertigo, denies headaches  MUSCULOSKELETAL: Denies any muscle or joint pain  SKIN: Denies skin rashes or itching  ENDOCRINE: Denies excessive thirst  Denies intolerance to heat or cold  PSYCHOSOCIAL: Denies depression or anxiety   Denies any recent memory loss  VIRTUAL VISIT DISCLAIMER    Benedict Reed acknowledges that she has consented to an online visit or consultation  she understands that the online visit is based solely on information provided by her, and that in the absence of a face-to-face physical evaluation by the physician the diagnosis she receives is both limited and provisional in terms of accuracy and completeness  This is not intended to replace a full medical face-to-face evaluation by the physician  Benedict Reed understands and accepts these terms  I spent 15 minutes with the patient during this visit

## 2020-03-30 NOTE — TELEPHONE ENCOUNTER
Pts daughter Farzaneh Cook states she would like her mothers scripts to be changed to 6 months  Also, pt has a lump on her leg and she was d/c from the hospital a week ago  Should she bring pt in or do a virtual visit?   Farzaneh Cook can be reached at [phone]

## 2020-03-30 NOTE — PROGRESS NOTES
Assessment/Plan:             Problem List Items Addressed This Visit        Digestive    Adenocarcinoma determined by biopsy of liver [facility])       Cardiovascular and Mediastinum    Essential hypertension (Chronic)    Relevant Medications    amLODIPine (NORVASC) 2 5 mg tablet    Paroxysmal atrial fibrillation (HCC) (Chronic)    Relevant Medications    amLODIPine (NORVASC) 2 5 mg tablet    Sick sinus syndrome (HCC) - Primary (Chronic)    Relevant Medications    amLODIPine (NORVASC) 2 5 mg tablet       Nervous and Auditory    Peripheral polyneuropathy (Chronic)    Relevant Medications    gabapentin (NEURONTIN) 100 mg capsule       Other    Hyperlipidemia (Chronic)    Relevant Medications    atorvastatin (LIPITOR) 20 mg tablet    Hypokalemia    Relevant Medications    potassium chloride (K-DUR,KLOR-CON) 10 mEq tablet      Other Visit Diagnoses     Encounter for support and coordination of transition of care        Acute bilateral low back pain without sciatica        Relevant Medications    cyclobenzaprine (FLEXERIL) 5 mg tablet    Leg avulsion, right, sequela        Relevant Medications    cephalexin (KEFLEX) 250 mg capsule    PAF (paroxysmal atrial fibrillation) (HCC)        Relevant Medications    amLODIPine (NORVASC) 2 5 mg tablet    apixaban (Eliquis) 5 mg    Chronic anticoagulation        Relevant Medications    apixaban (Eliquis) 5 mg            Subjective:      Patient ID: Emilie Gonzalez is a 80 y o  female    Patient here for transition of care visit-was hospitalized at [address] from 3/20-3/25 where she presented with abdominal pain and found to have constipation  There was concern that there is obstruction in the left upper quadrant on initial CT scan in she did undergo colonoscopy which did not show any obstruction  She did have evidence of a liver mass that was found in eventually biopsied after she was taken off of her Eliquis anticoagulation    The biopsy has returned showing evidence of adenocarcinoma with special stains showing more of a tendency towards biliary source  She was evaluated by Dr Kristen Santa with a video visit and plans are made now for a outpatient EGD  She was also in contact with Dr Tennille Resendiz with surgery oncology and GI will wait the EGD results before setting up an appointment with surgical and medical oncology  Patient denies any abdominal pain at present but most of her symptoms were related to constipation  She is now using MiraLax twice daily in addition to prune juice in her other stool softeners and did have 2 bowel movements this morning and 6 yesterday  Patient had been in the ER on March 14, 2020 after a fall which she sustained an avulsion injury on her right knee and a large contusion on the medial aspect of the right lower leg  Her daughter has been providing wound care to the area as the wound care center is closed currently here at [facility] due to the covid 19 pandemic  She has noted some increased darkening of the wound area medially on the right leg with a small amount of bloody drainage  Patient is having significant low back pain since that fall and had tried tramadol as well as restarted gabapentin at 200 mg at HS with some minimal   improvement  She is willing to try low-dose cyclobenzaprine at night to see if that helps with her back pain symptoms      The following portions of the patient's history were reviewed and updated as appropriate: allergies, current medications and problem list      Review of Systems   Constitutional: Negative for fever  Respiratory: Negative for cough and shortness of breath  Cardiovascular: Positive for leg swelling  Increased edema in the lower legs in the last several days  Patient has not noted any shortness of breath with lying flat at night or any increased palpitations    Her diuretics were stopped in the hospital but resume dad torsemide 20 mg daily after return home-she follows with Dr Jonny Fuentes  for this  Her amiodarone was stopped at the hospital also because of bradycardia and she has not had any palpitations since she has been home-she does have history of PAF    She was instructed to call us if there was worsening arrhythmia issues         Objective:      Current Outpatient Medications:     amLODIPine (NORVASC) 2 5 mg tablet, Take 1 tablet (2 5 mg total) by mouth daily, Disp: 90 tablet, Rfl: 3    apixaban (Eliquis) 5 mg, Take 1 tablet (5 mg total) by mouth 2 (two) times a day, Disp: 180 tablet, Rfl: 3    atorvastatin (LIPITOR) 20 mg tablet, Take 1 tablet (20 mg total) by mouth every evening, Disp: 90 tablet, Rfl: 3    Coenzyme Q10 (COQ10) 100 MG CAPS, Take 1 capsule by mouth Daily, Disp: , Rfl:     conjugated estrogens (PREMARIN) vaginal cream, Apply a pea size amount of the estrogen cream to the opening of the vagina three nights per week , Disp: , Rfl:     gabapentin (NEURONTIN) 100 mg capsule, Take 2 capsules (200 mg total) by mouth 2 (two) times a day Increased dose 2/26/20, Disp: 180 capsule, Rfl: 3    Multiple Vitamins-Minerals (OCUVITE EXTRA) TABS, Take 1 tablet by mouth daily, Disp: , Rfl:     Omega-3 1000 MG CAPS, Take by mouth, Disp: , Rfl:     polyethylene glycol (GLYCOLAX) powder, Take 17 g by mouth daily, Disp: , Rfl:     potassium chloride (K-DUR,KLOR-CON) 10 mEq tablet, Take 1 tablet (10 mEq total) by mouth 2 (two) times a day, Disp: 180 tablet, Rfl: 3    senna-docusate sodium (SENOKOT S) 8 6-50 mg per tablet, Take 1 tablet by mouth daily, Disp: 9 tablet, Rfl: 0    torsemide (DEMADEX) 20 mg tablet, Take 1 tablet (20 mg total) by mouth daily, Disp: 30 tablet, Rfl: 6    cephalexin (KEFLEX) 250 mg capsule, Take 1 capsule (250 mg total) by mouth every 8 (eight) hours for 7 days, Disp: 28 capsule, Rfl: 0    cyclobenzaprine (FLEXERIL) 5 mg tablet, Take 1 tablet (5 mg total) by mouth 2 (two) times a day as needed for muscle spasms, Disp: 60 tablet, Rfl: 0    Blood pressure 126/76, pulse 58, temperature 98 6 °F (37 °C), temperature source Tympanic, weight 56 4 kg (124 lb 6 4 oz), SpO2 98 %, not currently breastfeeding  Physical Exam   Constitutional: No distress  HENT:   Nose: Nose normal    Mouth/Throat: No oropharyngeal exudate  Heart of hearing bilaterally   Eyes: Pupils are equal, round, and reactive to light  Conjunctivae are normal  Right eye exhibits no discharge  Left eye exhibits no discharge  No scleral icterus  Neck: No JVD present  No thyromegaly present  Cardiovascular: Regular rhythm  Murmur heard  Systolic murmur in the aortic region  Crisp valve closure sounds  MR murmur also noted grade 2/6  Heart regular with rates at 54 currently   Pulmonary/Chest: Effort normal and breath sounds normal  She has no rales  Abdominal: Soft  Bowel sounds are normal  She exhibits no distension  Musculoskeletal: She exhibits tenderness  Lumbar tenderness   Lymphadenopathy:     She has no cervical adenopathy  Skin:   Healing laceration over the knee with small amount of serous drainage but no erythema in that region  Ecchymosis in the medial aspect of the right leg with crusting a small amount of bloody drainage  Slight increased warmth in this region   Nursing note and vitals reviewed  TCM Call (since 2/28/2020)     Date and time call was made  3/30/2020 11:14 AM    Hospital care reviewed  Records reviewed    Patient was hospitialized at  [facility]    Date of Admission  03/20/20    Date of discharge  03/25/20    Diagnosis  Abnornal CT of the Abdomen     Disposition  Home    Were the patients medications reviewed and updated  Yes    Current Symptoms  None      TCM Call (since 2/28/2020)     Should patient be enrolled in anticoag monitoring? No    Scheduled for follow up?   Yes    Did you obtain your prescribed medications  Yes    Do you need help managing your prescriptions or medications  Yes    Why type of assitance do you need  Daugher helps with medications Is transportation to your appointment needed  Yes    Specify why  Daughter drives     I have advised the patient to call PCP with any new or worsening symptoms  Wade Lazcano, [address], [address]  Friends; Family;  Children    Are you recieving any outpatient services  No    Are you recieving home care services  No

## 2020-03-31 ENCOUNTER — TELEPHONE (OUTPATIENT)
Dept: GASTROENTEROLOGY | Facility: CLINIC | Age: 85
End: 2020-03-31

## 2020-03-31 ENCOUNTER — TELEPHONE (OUTPATIENT)
Dept: SURGICAL ONCOLOGY | Facility: CLINIC | Age: 85
End: 2020-03-31

## 2020-03-31 ENCOUNTER — TELEMEDICINE (OUTPATIENT)
Dept: SURGICAL ONCOLOGY | Facility: CLINIC | Age: 85
End: 2020-03-31
Payer: MEDICARE

## 2020-03-31 DIAGNOSIS — C22.9 ADENOCARCINOMA DETERMINED BY BIOPSY OF LIVER (HCC): Primary | ICD-10-CM

## 2020-03-31 PROCEDURE — 99442 PR PHYS/QHP TELEPHONE EVALUATION 11-20 MIN: CPT | Performed by: SURGERY

## 2020-03-31 NOTE — TELEPHONE ENCOUNTER
----- Message from Nancey Cushing, RN sent at 3/31/2020 11:16 AM EDT -----  Pt's daughter José Kovacs called yesterday looking for results  I see they are in her chart now  Can you call her and ask if she would be ok with a phone visit this afternoon, maybe around 2:00?   Manuel's # is [phone]

## 2020-03-31 NOTE — LETTER
March 31, 2020     Anibal Somers, [address]  [address] [address]    Patient: Maria A Luna   YOB: 1929   Date of Visit: 3/31/2020       Dear Dr Jenni Lucas: Thank you for referring Celina Gutierrez to me for evaluation  Below are my notes for this consultation  If you have questions, please do not hesitate to call me  I look forward to following your patient along with you  Sincerely,        Brenda Arce MD        CC: MD Brenda Vega MD  3/31/2020  2:34 PM  Sign at close encounter  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 ([facility] [address] ) - Primary                Reason for visit is cholangiocarcinoma  Encounter provider Brenda Arce MD    Provider located at [address] [address]      Recent Visits  Date Type Provider Dept   03/30/20 Office Visit Zarina Kuhn [facility]   03/30/20 Telephone Zarina Kuhn [facility]   Showing recent visits within past 7 days and meeting all other requirements     Today's Visits  Date Type Provider Dept   03/31/20 Telephone Stefano Osler, [facility] today's visits and meeting all other requirements     Future Appointments  No visits were found meeting these conditions  Showing future appointments within next 150 days and meeting all other requirements        After connecting through telephone, the patient was identified by name and date of birth  Maria A Luna was informed that this is a telemedicine visit and that the visit is being conducted through telephone  My office door was closed  No one else was in the room  She acknowledged consent and understanding of privacy and security of the video platform   The patient has agreed to participate and understands they can discontinue the visit at any time  The majority of this conversation was with the patient's daughter Aida Ponce  Patient is aware this is a billable service  Subjective  Chan Eaton is a 80 y o  female with an incidentally discovered liver mass  Colonoscopy was negative  Liver biopsied and revealed well-differentiated adenocarcinoma  This was felt to either be cholangiocarcinoma Afirma pancreaticobiliary region or upper GI  Her imaging which I had reviewed when she was in the hospital did not show any pancreas masses  The patient has been discharged  She is awaiting phone call from GI to schedule an upper endoscopy        Past Medical History:   Diagnosis Date    Acute myocardial infarction [facility])     Aortic valve disease     Atrial fibrillation (HCC)     CAD (coronary artery disease)     Cancer (HCC)     skin cancer removed from left leg    Cellulitis      AND ABSCESS    Cervical stenosis of spine     CHF (congestive heart failure) (HCC)     Cholecystitis     Closed Colles' fracture     OF THE LEFT WRIST; LAST ASSESSED: 5/2/14    Closed fracture of radius     LAST ASSESSED: 2/19/14    Diverticulosis     Dyspnea     Hair loss disorder     Hematuria     History of being hospitalized     1/5/12-1/14/12 [facility] Jan Hutchinson RAHEEM PROCEDURES: 1) REDO STERNOTOMY AND AORTIC VALVE REPLACEMENT WITH A 19-MM REBA-PETERS BOVINIE PERICARDIAL MAGNA EASE VALVE 2) TRANSESOPHAGEAL ECHOCARDIOGRAM     Hypercholesterolemia     Hypertension     Incomplete bladder emptying     Leg wound, left     Malaise and fatigue     Renal disorder     Sick sinus syndrome ([address] )     Urinary tract infection        Past Surgical History:   Procedure Laterality Date    AORTIC VALVE REPLACEMENT  01/2012    HEART VALVE    CARDIAC SURGERY      CATARACT EXTRACTION W/  INTRAOCULAR LENS IMPLANT  08/26/2010    Alexa Randall MD; PHACOEMULISIFICATION; INSERTION INTRAOCULAR LENS OD    COLONOSCOPY  03/08/2007    CORONARY ARTERY BYPASS GRAFT  01/2005    IR IMAGE GUIDED BIOPSY/ASPIRATION LIVER  3/25/2020    LEG SURGERY      RI KNEE SCOPE,MED/LAT MENISECTOMY Left 9/23/2019    Procedure: ARTHROSCOPY KNEE, LEFT PARTIAL MEDIAL MENISCECTOMY;  Surgeon: Alana Terrell MD;  Location:  MAIN OR;  Service: Orthopedics       Current Outpatient Medications   Medication Sig Dispense Refill    amLODIPine (NORVASC) 2 5 mg tablet Take 1 tablet (2 5 mg total) by mouth daily 90 tablet 3    apixaban (Eliquis) 5 mg Take 1 tablet (5 mg total) by mouth 2 (two) times a day 180 tablet 3    atorvastatin (LIPITOR) 20 mg tablet Take 1 tablet (20 mg total) by mouth every evening 90 tablet 3    cephalexin (KEFLEX) 250 mg capsule Take 1 capsule (250 mg total) by mouth every 8 (eight) hours for 7 days 28 capsule 0    Coenzyme Q10 (COQ10) 100 MG CAPS Take 1 capsule by mouth Daily      conjugated estrogens (PREMARIN) vaginal cream Apply a pea size amount of the estrogen cream to the opening of the vagina three nights per week   cyclobenzaprine (FLEXERIL) 5 mg tablet Take 1 tablet (5 mg total) by mouth 2 (two) times a day as needed for muscle spasms 60 tablet 0    gabapentin (NEURONTIN) 100 mg capsule Take 2 capsules (200 mg total) by mouth 2 (two) times a day Increased dose 2/26/20 180 capsule 3    Multiple Vitamins-Minerals (OCUVITE EXTRA) TABS Take 1 tablet by mouth daily      Omega-3 1000 MG CAPS Take by mouth      polyethylene glycol (GLYCOLAX) powder Take 17 g by mouth daily      potassium chloride (K-DUR,KLOR-CON) 10 mEq tablet Take 1 tablet (10 mEq total) by mouth 2 (two) times a day 180 tablet 3    senna-docusate sodium (SENOKOT S) 8 6-50 mg per tablet Take 1 tablet by mouth daily 9 tablet 0    torsemide (DEMADEX) 20 mg tablet Take 1 tablet (20 mg total) by mouth daily 30 tablet 6     No current facility-administered medications for this visit           Allergies   Allergen Reactions    Heparin      Annotation - [id number]: LOW PLATELETS    Phenazopyridine      As per daughter "Not appropriate for pt due to heart condition"       Review of Systems   Cardiac has CHF  Respiratory is negative  All other systems are as above or negative    Assessment/plan:  26-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

## 2020-03-31 NOTE — PROGRESS NOTES
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 ([facility] [address] ) - Primary                Reason for visit is cholangiocarcinoma  Encounter provider Asia Maki MD    Provider located at [address] [place] [id number]      Recent Visits  Date Type Provider Dept   03/30/20 Office Visit Zarina Perez [facility]   03/30/20 Telephone Zarina Perez [facility]   Showing recent visits within past 7 days and meeting all other requirements     Today's Visits  Date Type Provider Dept   03/31/20 Telephone Chidi Jean Baptiste, [facility] today's visits and meeting all other requirements     Future Appointments  No visits were found meeting these conditions  Showing future appointments within next 150 days and meeting all other requirements        After connecting through telephone, the patient was identified by name and date of birth  Emilie Gonzalez was informed that this is a telemedicine visit and that the visit is being conducted through telephone  My office door was closed  No one else was in the room  She acknowledged consent and understanding of privacy and security of the video platform  The patient has agreed to participate and understands they can discontinue the visit at any time  The majority of this conversation was with the patient's daughter Bevely Purchase  Patient is aware this is a billable service  Subjective  Emilie Gonzalez is a 80 y o  female with an incidentally discovered liver mass  Colonoscopy was negative  Liver biopsied and revealed well-differentiated adenocarcinoma  This was felt to either be cholangiocarcinoma Afirma pancreaticobiliary region or upper GI  Her imaging which I had reviewed when she was in the hospital did not show any pancreas masses    The patient has been discharged  She is awaiting phone call from GI to schedule an upper endoscopy        Past Medical History:   Diagnosis Date    Acute myocardial infarction [facility])     Aortic valve disease     Atrial fibrillation (HCC)     CAD (coronary artery disease)     Cancer (HCC)     skin cancer removed from left leg    Cellulitis      AND ABSCESS    Cervical stenosis of spine     CHF (congestive heart failure) (HCC)     Cholecystitis     Closed Colles' fracture     OF THE LEFT WRIST; LAST ASSESSED: 5/2/14    Closed fracture of radius     LAST ASSESSED: 2/19/14    Diverticulosis     Dyspnea     Hair loss disorder     Hematuria     History of being hospitalized     1/5/12-1/14/12 Juan Diego MACIEL PROCEDURES: 1) REDO STERNOTOMY AND AORTIC VALVE REPLACEMENT WITH A 19-MM REBA-PETERS BOVINIE PERICARDIAL MAGNA EASE VALVE 2) TRANSESOPHAGEAL ECHOCARDIOGRAM     Hypercholesterolemia     Hypertension     Incomplete bladder emptying     Leg wound, left     Malaise and fatigue     Renal disorder     Sick sinus syndrome (HCC)     Urinary tract infection        Past Surgical History:   Procedure Laterality Date    AORTIC VALVE REPLACEMENT  01/2012    HEART VALVE    CARDIAC SURGERY      CATARACT EXTRACTION W/  INTRAOCULAR LENS IMPLANT  08/26/2010    Maurisio Arreola MD; PHACOEMULISIFICATION; INSERTION INTRAOCULAR LENS OD    COLONOSCOPY  03/08/2007    CORONARY ARTERY BYPASS GRAFT  01/2005    IR IMAGE GUIDED BIOPSY/ASPIRATION LIVER  3/25/2020    LEG SURGERY      DC KNEE SCOPE,MED/LAT MENISECTOMY Left 9/23/2019    Procedure: ARTHROSCOPY KNEE, LEFT PARTIAL MEDIAL MENISCECTOMY;  Surgeon: Ambrocio Lopez MD;  Location: [facility] OR;  Service: Orthopedics       Current Outpatient Medications   Medication Sig Dispense Refill    amLODIPine (NORVASC) 2 5 mg tablet Take 1 tablet (2 5 mg total) by mouth daily 90 tablet 3    apixaban (Eliquis) 5 mg Take 1 tablet (5 mg total) by mouth 2 (two) times a day 180 tablet 3    atorvastatin (LIPITOR) 20 mg tablet Take 1 tablet (20 mg total) by mouth every evening 90 tablet 3    cephalexin (KEFLEX) 250 mg capsule Take 1 capsule (250 mg total) by mouth every 8 (eight) hours for 7 days 28 capsule 0    Coenzyme Q10 (COQ10) 100 MG CAPS Take 1 capsule by mouth Daily      conjugated estrogens (PREMARIN) vaginal cream Apply a pea size amount of the estrogen cream to the opening of the vagina three nights per week   cyclobenzaprine (FLEXERIL) 5 mg tablet Take 1 tablet (5 mg total) by mouth 2 (two) times a day as needed for muscle spasms 60 tablet 0    gabapentin (NEURONTIN) 100 mg capsule Take 2 capsules (200 mg total) by mouth 2 (two) times a day Increased dose 2/26/20 180 capsule 3    Multiple Vitamins-Minerals (OCUVITE EXTRA) TABS Take 1 tablet by mouth daily      Omega-3 1000 MG CAPS Take by mouth      polyethylene glycol (GLYCOLAX) powder Take 17 g by mouth daily      potassium chloride (K-DUR,KLOR-CON) 10 mEq tablet Take 1 tablet (10 mEq total) by mouth 2 (two) times a day 180 tablet 3    senna-docusate sodium (SENOKOT S) 8 6-50 mg per tablet Take 1 tablet by mouth daily 9 tablet 0    torsemide (DEMADEX) 20 mg tablet Take 1 tablet (20 mg total) by mouth daily 30 tablet 6     No current facility-administered medications for this visit  Allergies   Allergen Reactions    Heparin      Annotation - [id number]: LOW PLATELETS    Phenazopyridine      As per daughter "Not appropriate for pt due to heart condition"       Review of Systems   Cardiac has CHF  Respiratory is negative  All other systems are as above or negative    Assessment/plan:  70-year-old female with 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

## 2020-03-31 NOTE — TELEPHONE ENCOUNTER
Pt is scheduled for egd 4/8/20-slub-needs phone prep and blood thinner clearance   Paperwork to [place]

## 2020-04-02 ENCOUNTER — PATIENT OUTREACH (OUTPATIENT)
Dept: HEMATOLOGY ONCOLOGY | Facility: CLINIC | Age: 85
End: 2020-04-02

## 2020-04-02 NOTE — TELEPHONE ENCOUNTER
Xiomara- I rescheduled this to 4/7 with patient's daughter  She would like a call regarding eliquis hold- is aware you are waiting on clearance still   Thanks

## 2020-04-03 ENCOUNTER — TELEPHONE (OUTPATIENT)
Dept: GASTROENTEROLOGY | Facility: CLINIC | Age: 85
End: 2020-04-03

## 2020-04-06 RX ORDER — SODIUM CHLORIDE 9 MG/ML
50 INJECTION, SOLUTION INTRAVENOUS CONTINUOUS
Status: CANCELLED | OUTPATIENT
Start: 2020-04-07

## 2020-04-07 ENCOUNTER — HOSPITAL ENCOUNTER (OUTPATIENT)
Dept: GASTROENTEROLOGY | Facility: HOSPITAL | Age: 85
Setting detail: OUTPATIENT SURGERY
Discharge: HOME | End: 2020-04-07
Attending: INTERNAL MEDICINE | Admitting: INTERNAL MEDICINE
Payer: MEDICARE

## 2020-04-07 ENCOUNTER — ANESTHESIA EVENT (OUTPATIENT)
Dept: GASTROENTEROLOGY | Facility: HOSPITAL | Age: 85
End: 2020-04-07

## 2020-04-07 ENCOUNTER — ANESTHESIA (OUTPATIENT)
Dept: GASTROENTEROLOGY | Facility: HOSPITAL | Age: 85
End: 2020-04-07

## 2020-04-07 VITALS
TEMPERATURE: 99 F | RESPIRATION RATE: 18 BRPM | SYSTOLIC BLOOD PRESSURE: 147 MMHG | HEIGHT: 62 IN | BODY MASS INDEX: 21.35 KG/M2 | OXYGEN SATURATION: 95 % | HEART RATE: 52 BPM | WEIGHT: 116 LBS | DIASTOLIC BLOOD PRESSURE: 69 MMHG

## 2020-04-07 DIAGNOSIS — C22.9 ADENOCARCINOMA DETERMINED BY BIOPSY OF LIVER (HCC): ICD-10-CM

## 2020-04-07 PROCEDURE — 1123F ACP DISCUSS/DSCN MKR DOCD: CPT | Performed by: INTERNAL MEDICINE

## 2020-04-07 PROCEDURE — 43235 EGD DIAGNOSTIC BRUSH WASH: CPT

## 2020-04-07 RX ORDER — SODIUM CHLORIDE 9 MG/ML
INJECTION, SOLUTION INTRAVENOUS CONTINUOUS PRN
Status: DISCONTINUED | OUTPATIENT
Start: 2020-04-07 | End: 2020-04-07 | Stop reason: SURG

## 2020-04-07 RX ORDER — PROPOFOL 10 MG/ML
INJECTION, EMULSION INTRAVENOUS AS NEEDED
Status: DISCONTINUED | OUTPATIENT
Start: 2020-04-07 | End: 2020-04-07 | Stop reason: SURG

## 2020-04-07 RX ADMIN — PROPOFOL 50 MG: 10 INJECTION, EMULSION INTRAVENOUS at 08:08

## 2020-04-07 RX ADMIN — PROPOFOL 20 MG: 10 INJECTION, EMULSION INTRAVENOUS at 08:10

## 2020-04-07 RX ADMIN — SODIUM CHLORIDE: 0.9 INJECTION, SOLUTION INTRAVENOUS at 08:02

## 2020-04-08 ENCOUNTER — CLINICAL SUPPORT (OUTPATIENT)
Dept: RADIATION ONCOLOGY | Facility: CLINIC | Age: 85
End: 2020-04-08
Attending: STUDENT IN AN ORGANIZED HEALTH CARE EDUCATION/TRAINING PROGRAM
Payer: MEDICARE

## 2020-04-08 VITALS — BODY MASS INDEX: 21.35 KG/M2 | WEIGHT: 116 LBS | HEIGHT: 62 IN

## 2020-04-08 DIAGNOSIS — C22.9 ADENOCARCINOMA DETERMINED BY BIOPSY OF LIVER (HCC): Primary | ICD-10-CM

## 2020-04-08 PROCEDURE — 99211 OFF/OP EST MAY X REQ PHY/QHP: CPT | Performed by: STUDENT IN AN ORGANIZED HEALTH CARE EDUCATION/TRAINING PROGRAM

## 2020-04-08 RX ORDER — ACETAMINOPHEN 500 MG
1000 TABLET ORAL EVERY 6 HOURS PRN
COMMUNITY
End: 2020-07-23 | Stop reason: ALTCHOICE

## 2020-04-09 ENCOUNTER — TELEMEDICINE (OUTPATIENT)
Dept: CARDIOLOGY CLINIC | Facility: CLINIC | Age: 85
End: 2020-04-09
Payer: MEDICARE

## 2020-04-09 VITALS
SYSTOLIC BLOOD PRESSURE: 160 MMHG | HEIGHT: 62 IN | HEART RATE: 54 BPM | DIASTOLIC BLOOD PRESSURE: 70 MMHG | BODY MASS INDEX: 21.35 KG/M2 | WEIGHT: 116 LBS

## 2020-04-09 DIAGNOSIS — Z95.1 HX OF CABG: Chronic | ICD-10-CM

## 2020-04-09 DIAGNOSIS — Z95.2 HISTORY OF AORTIC VALVE REPLACEMENT: Chronic | ICD-10-CM

## 2020-04-09 DIAGNOSIS — I10 ESSENTIAL HYPERTENSION: Chronic | ICD-10-CM

## 2020-04-09 DIAGNOSIS — E78.00 PURE HYPERCHOLESTEROLEMIA: Chronic | ICD-10-CM

## 2020-04-09 DIAGNOSIS — I25.10 CORONARY ARTERY DISEASE INVOLVING NATIVE CORONARY ARTERY OF NATIVE HEART WITHOUT ANGINA PECTORIS: ICD-10-CM

## 2020-04-09 DIAGNOSIS — I48.0 PAROXYSMAL ATRIAL FIBRILLATION (HCC): Primary | Chronic | ICD-10-CM

## 2020-04-09 DIAGNOSIS — I49.5 SICK SINUS SYNDROME (HCC): Chronic | ICD-10-CM

## 2020-04-09 DIAGNOSIS — I50.32 CHRONIC DIASTOLIC CONGESTIVE HEART FAILURE (HCC): Chronic | ICD-10-CM

## 2020-04-09 DIAGNOSIS — R00.1 BRADYCARDIA: ICD-10-CM

## 2020-04-09 PROCEDURE — 99214 OFFICE O/P EST MOD 30 MIN: CPT | Performed by: INTERNAL MEDICINE

## 2020-04-13 ENCOUNTER — APPOINTMENT (OUTPATIENT)
Dept: RADIATION ONCOLOGY | Facility: HOSPITAL | Age: 85
End: 2020-04-13
Attending: STUDENT IN AN ORGANIZED HEALTH CARE EDUCATION/TRAINING PROGRAM
Payer: MEDICARE

## 2020-04-13 PROCEDURE — 77290 THER RAD SIMULAJ FIELD CPLX: CPT | Performed by: STUDENT IN AN ORGANIZED HEALTH CARE EDUCATION/TRAINING PROGRAM

## 2020-04-13 PROCEDURE — 77334 RADIATION TREATMENT AID(S): CPT | Performed by: STUDENT IN AN ORGANIZED HEALTH CARE EDUCATION/TRAINING PROGRAM

## 2020-04-13 PROCEDURE — 77470 SPECIAL RADIATION TREATMENT: CPT | Performed by: STUDENT IN AN ORGANIZED HEALTH CARE EDUCATION/TRAINING PROGRAM

## 2020-04-16 ENCOUNTER — DOCUMENTATION (OUTPATIENT)
Dept: HEMATOLOGY ONCOLOGY | Facility: CLINIC | Age: 85
End: 2020-04-16

## 2020-04-16 PROCEDURE — 77370 RADIATION PHYSICS CONSULT: CPT | Performed by: STUDENT IN AN ORGANIZED HEALTH CARE EDUCATION/TRAINING PROGRAM

## 2020-04-27 PROCEDURE — 77295 3-D RADIOTHERAPY PLAN: CPT | Performed by: STUDENT IN AN ORGANIZED HEALTH CARE EDUCATION/TRAINING PROGRAM

## 2020-04-27 PROCEDURE — 77293 RESPIRATOR MOTION MGMT SIMUL: CPT | Performed by: STUDENT IN AN ORGANIZED HEALTH CARE EDUCATION/TRAINING PROGRAM

## 2020-04-27 PROCEDURE — 77300 RADIATION THERAPY DOSE PLAN: CPT | Performed by: STUDENT IN AN ORGANIZED HEALTH CARE EDUCATION/TRAINING PROGRAM

## 2020-04-27 PROCEDURE — 77334 RADIATION TREATMENT AID(S): CPT | Performed by: STUDENT IN AN ORGANIZED HEALTH CARE EDUCATION/TRAINING PROGRAM

## 2020-04-28 ENCOUNTER — APPOINTMENT (OUTPATIENT)
Dept: RADIATION ONCOLOGY | Facility: HOSPITAL | Age: 85
End: 2020-04-28
Attending: STUDENT IN AN ORGANIZED HEALTH CARE EDUCATION/TRAINING PROGRAM
Payer: MEDICARE

## 2020-04-28 PROCEDURE — 77280 THER RAD SIMULAJ FIELD SMPL: CPT | Performed by: STUDENT IN AN ORGANIZED HEALTH CARE EDUCATION/TRAINING PROGRAM

## 2020-04-28 PROCEDURE — 77373 STRTCTC BDY RAD THER TX DLVR: CPT | Performed by: STUDENT IN AN ORGANIZED HEALTH CARE EDUCATION/TRAINING PROGRAM

## 2020-04-29 DIAGNOSIS — R60.0 LOWER EXTREMITY EDEMA: ICD-10-CM

## 2020-04-29 RX ORDER — TORSEMIDE 20 MG/1
TABLET ORAL
Qty: 30 TABLET | Refills: 6 | Status: SHIPPED | OUTPATIENT
Start: 2020-04-29 | End: 2020-07-22 | Stop reason: SDUPTHER

## 2020-04-30 ENCOUNTER — APPOINTMENT (OUTPATIENT)
Dept: RADIATION ONCOLOGY | Facility: HOSPITAL | Age: 85
End: 2020-04-30
Attending: STUDENT IN AN ORGANIZED HEALTH CARE EDUCATION/TRAINING PROGRAM
Payer: MEDICARE

## 2020-04-30 PROCEDURE — 77373 STRTCTC BDY RAD THER TX DLVR: CPT | Performed by: RADIOLOGY

## 2020-05-01 ENCOUNTER — APPOINTMENT (OUTPATIENT)
Dept: RADIATION ONCOLOGY | Facility: HOSPITAL | Age: 85
End: 2020-05-01
Attending: RADIOLOGY
Payer: MEDICARE

## 2020-05-01 DIAGNOSIS — C22.9 ADENOCARCINOMA DETERMINED BY BIOPSY OF LIVER (HCC): Primary | ICD-10-CM

## 2020-05-04 ENCOUNTER — APPOINTMENT (OUTPATIENT)
Dept: LAB | Facility: HOSPITAL | Age: 85
End: 2020-05-04
Attending: INTERNAL MEDICINE
Payer: MEDICARE

## 2020-05-04 ENCOUNTER — APPOINTMENT (OUTPATIENT)
Dept: RADIATION ONCOLOGY | Facility: HOSPITAL | Age: 85
End: 2020-05-04
Attending: RADIOLOGY
Payer: MEDICARE

## 2020-05-04 DIAGNOSIS — C22.9 ADENOCARCINOMA DETERMINED BY BIOPSY OF LIVER (HCC): ICD-10-CM

## 2020-05-04 DIAGNOSIS — N18.30 CKD (CHRONIC KIDNEY DISEASE) STAGE 3, GFR 30-59 ML/MIN (HCC): ICD-10-CM

## 2020-05-04 LAB
ALBUMIN SERPL BCP-MCNC: 3.7 G/DL (ref 3.5–5)
ALP SERPL-CCNC: 135 U/L (ref 46–116)
ALT SERPL W P-5'-P-CCNC: 21 U/L (ref 12–78)
ANION GAP SERPL CALCULATED.3IONS-SCNC: 6 MMOL/L (ref 4–13)
AST SERPL W P-5'-P-CCNC: 21 U/L (ref 5–45)
BASOPHILS # BLD AUTO: 0.03 THOUSANDS/ΜL (ref 0–0.1)
BASOPHILS NFR BLD AUTO: 0 % (ref 0–1)
BILIRUB SERPL-MCNC: 0.61 MG/DL (ref 0.2–1)
BUN SERPL-MCNC: 21 MG/DL (ref 5–25)
CALCIUM SERPL-MCNC: 9.1 MG/DL (ref 8.3–10.1)
CHLORIDE SERPL-SCNC: 107 MMOL/L (ref 100–108)
CHOLEST SERPL-MCNC: 154 MG/DL (ref 50–200)
CO2 SERPL-SCNC: 29 MMOL/L (ref 21–32)
CREAT SERPL-MCNC: 1.11 MG/DL (ref 0.6–1.3)
EOSINOPHIL # BLD AUTO: 0.12 THOUSAND/ΜL (ref 0–0.61)
EOSINOPHIL NFR BLD AUTO: 2 % (ref 0–6)
ERYTHROCYTE [DISTWIDTH] IN BLOOD BY AUTOMATED COUNT: 15.8 % (ref 11.6–15.1)
GFR SERPL CREATININE-BSD FRML MDRD: 44 ML/MIN/1.73SQ M
GLUCOSE P FAST SERPL-MCNC: 85 MG/DL (ref 65–99)
HCT VFR BLD AUTO: 34.4 % (ref 34.8–46.1)
HDLC SERPL-MCNC: 60 MG/DL
HGB BLD-MCNC: 10.6 G/DL (ref 11.5–15.4)
IMM GRANULOCYTES # BLD AUTO: 0.02 THOUSAND/UL (ref 0–0.2)
IMM GRANULOCYTES NFR BLD AUTO: 0 % (ref 0–2)
LDLC SERPL CALC-MCNC: 75 MG/DL (ref 0–100)
LYMPHOCYTES # BLD AUTO: 1.52 THOUSANDS/ΜL (ref 0.6–4.47)
LYMPHOCYTES NFR BLD AUTO: 23 % (ref 14–44)
MCH RBC QN AUTO: 27.5 PG (ref 26.8–34.3)
MCHC RBC AUTO-ENTMCNC: 30.8 G/DL (ref 31.4–37.4)
MCV RBC AUTO: 89 FL (ref 82–98)
MONOCYTES # BLD AUTO: 0.73 THOUSAND/ΜL (ref 0.17–1.22)
MONOCYTES NFR BLD AUTO: 11 % (ref 4–12)
NEUTROPHILS # BLD AUTO: 4.25 THOUSANDS/ΜL (ref 1.85–7.62)
NEUTS SEG NFR BLD AUTO: 64 % (ref 43–75)
NRBC BLD AUTO-RTO: 0 /100 WBCS
PLATELET # BLD AUTO: 158 THOUSANDS/UL (ref 149–390)
PMV BLD AUTO: 11.3 FL (ref 8.9–12.7)
POTASSIUM SERPL-SCNC: 3.1 MMOL/L (ref 3.5–5.3)
PROT SERPL-MCNC: 7.2 G/DL (ref 6.4–8.2)
RBC # BLD AUTO: 3.86 MILLION/UL (ref 3.81–5.12)
SODIUM SERPL-SCNC: 142 MMOL/L (ref 136–145)
TRIGL SERPL-MCNC: 97 MG/DL
WBC # BLD AUTO: 6.67 THOUSAND/UL (ref 4.31–10.16)

## 2020-05-04 PROCEDURE — 80053 COMPREHEN METABOLIC PANEL: CPT

## 2020-05-04 PROCEDURE — 80061 LIPID PANEL: CPT

## 2020-05-04 PROCEDURE — 36415 COLL VENOUS BLD VENIPUNCTURE: CPT

## 2020-05-04 PROCEDURE — 85025 COMPLETE CBC W/AUTO DIFF WBC: CPT

## 2020-05-04 PROCEDURE — 77373 STRTCTC BDY RAD THER TX DLVR: CPT | Performed by: STUDENT IN AN ORGANIZED HEALTH CARE EDUCATION/TRAINING PROGRAM

## 2020-05-05 ENCOUNTER — DOCUMENTATION (OUTPATIENT)
Dept: FAMILY MEDICINE CLINIC | Facility: HOSPITAL | Age: 85
End: 2020-05-05

## 2020-05-05 DIAGNOSIS — I10 ESSENTIAL HYPERTENSION: Primary | Chronic | ICD-10-CM

## 2020-05-06 ENCOUNTER — APPOINTMENT (OUTPATIENT)
Dept: RADIATION ONCOLOGY | Facility: HOSPITAL | Age: 85
End: 2020-05-06
Attending: RADIOLOGY
Payer: MEDICARE

## 2020-05-06 PROCEDURE — 77373 STRTCTC BDY RAD THER TX DLVR: CPT | Performed by: RADIOLOGY

## 2020-05-08 ENCOUNTER — APPOINTMENT (OUTPATIENT)
Dept: RADIATION ONCOLOGY | Facility: HOSPITAL | Age: 85
End: 2020-05-08
Attending: RADIOLOGY
Payer: MEDICARE

## 2020-05-08 PROCEDURE — 77373 STRTCTC BDY RAD THER TX DLVR: CPT | Performed by: RADIOLOGY

## 2020-05-08 PROCEDURE — 77336 RADIATION PHYSICS CONSULT: CPT | Performed by: RADIOLOGY

## 2020-05-12 ENCOUNTER — OFFICE VISIT (OUTPATIENT)
Dept: FAMILY MEDICINE CLINIC | Facility: HOSPITAL | Age: 85
End: 2020-05-12
Payer: MEDICARE

## 2020-05-12 VITALS
DIASTOLIC BLOOD PRESSURE: 78 MMHG | TEMPERATURE: 98.2 F | OXYGEN SATURATION: 96 % | BODY MASS INDEX: 21.53 KG/M2 | HEIGHT: 62 IN | WEIGHT: 117 LBS | SYSTOLIC BLOOD PRESSURE: 150 MMHG | HEART RATE: 84 BPM

## 2020-05-12 DIAGNOSIS — N30.01 ACUTE CYSTITIS WITH HEMATURIA: Primary | ICD-10-CM

## 2020-05-12 DIAGNOSIS — N94.9 VAGINAL BURNING: ICD-10-CM

## 2020-05-12 DIAGNOSIS — C22.9 ADENOCARCINOMA DETERMINED BY BIOPSY OF LIVER (HCC): ICD-10-CM

## 2020-05-12 DIAGNOSIS — L91.0 KELOID SCAR OF SKIN: ICD-10-CM

## 2020-05-12 LAB
SL AMB  POCT GLUCOSE, UA: ABNORMAL
SL AMB LEUKOCYTE ESTERASE,UA: ABNORMAL
SL AMB POCT BILIRUBIN,UA: ABNORMAL
SL AMB POCT BLOOD,UA: ABNORMAL
SL AMB POCT CLARITY,UA: ABNORMAL
SL AMB POCT COLOR,UA: ABNORMAL
SL AMB POCT KETONES,UA: ABNORMAL
SL AMB POCT NITRITE,UA: ABNORMAL
SL AMB POCT PH,UA: 6.5
SL AMB POCT SPECIFIC GRAVITY,UA: 1.01
SL AMB POCT URINE PROTEIN: ABNORMAL
SL AMB POCT UROBILINOGEN: ABNORMAL

## 2020-05-12 PROCEDURE — 99214 OFFICE O/P EST MOD 30 MIN: CPT | Performed by: INTERNAL MEDICINE

## 2020-05-12 PROCEDURE — 4040F PNEUMOC VAC/ADMIN/RCVD: CPT | Performed by: INTERNAL MEDICINE

## 2020-05-12 PROCEDURE — 1036F TOBACCO NON-USER: CPT | Performed by: INTERNAL MEDICINE

## 2020-05-12 PROCEDURE — 81002 URINALYSIS NONAUTO W/O SCOPE: CPT | Performed by: INTERNAL MEDICINE

## 2020-05-12 PROCEDURE — 3078F DIAST BP <80 MM HG: CPT | Performed by: INTERNAL MEDICINE

## 2020-05-12 PROCEDURE — 1160F RVW MEDS BY RX/DR IN RCRD: CPT | Performed by: INTERNAL MEDICINE

## 2020-05-12 PROCEDURE — 1111F DSCHRG MED/CURRENT MED MERGE: CPT | Performed by: INTERNAL MEDICINE

## 2020-05-12 PROCEDURE — 87086 URINE CULTURE/COLONY COUNT: CPT | Performed by: INTERNAL MEDICINE

## 2020-05-12 PROCEDURE — 3008F BODY MASS INDEX DOCD: CPT | Performed by: INTERNAL MEDICINE

## 2020-05-12 PROCEDURE — 3077F SYST BP >= 140 MM HG: CPT | Performed by: INTERNAL MEDICINE

## 2020-05-12 RX ORDER — CLOTRIMAZOLE AND BETAMETHASONE DIPROPIONATE 10; .64 MG/G; MG/G
CREAM TOPICAL 2 TIMES DAILY
Qty: 30 G | Refills: 3 | Status: SHIPPED | OUTPATIENT
Start: 2020-05-12

## 2020-05-12 RX ORDER — NITROFURANTOIN 25; 75 MG/1; MG/1
100 CAPSULE ORAL 2 TIMES DAILY
Qty: 14 CAPSULE | Refills: 0 | Status: SHIPPED | OUTPATIENT
Start: 2020-05-12 | End: 2020-05-19

## 2020-05-13 LAB — BACTERIA UR CULT: NORMAL

## 2020-05-18 ENCOUNTER — TELEPHONE (OUTPATIENT)
Dept: CARDIOLOGY CLINIC | Facility: CLINIC | Age: 85
End: 2020-05-18

## 2020-05-21 ENCOUNTER — OFFICE VISIT (OUTPATIENT)
Dept: CARDIOLOGY CLINIC | Facility: CLINIC | Age: 85
End: 2020-05-21
Payer: MEDICARE

## 2020-05-21 ENCOUNTER — APPOINTMENT (OUTPATIENT)
Dept: LAB | Facility: HOSPITAL | Age: 85
End: 2020-05-21
Payer: MEDICARE

## 2020-05-21 VITALS
WEIGHT: 116 LBS | DIASTOLIC BLOOD PRESSURE: 70 MMHG | HEIGHT: 62 IN | HEART RATE: 69 BPM | SYSTOLIC BLOOD PRESSURE: 138 MMHG | BODY MASS INDEX: 21.35 KG/M2

## 2020-05-21 DIAGNOSIS — E87.6 HYPOKALEMIA: ICD-10-CM

## 2020-05-21 DIAGNOSIS — I49.5 SICK SINUS SYNDROME (HCC): ICD-10-CM

## 2020-05-21 DIAGNOSIS — I48.0 PAROXYSMAL ATRIAL FIBRILLATION (HCC): Primary | ICD-10-CM

## 2020-05-21 DIAGNOSIS — Z95.1 HX OF CABG: ICD-10-CM

## 2020-05-21 DIAGNOSIS — N18.30 CKD (CHRONIC KIDNEY DISEASE) STAGE 3, GFR 30-59 ML/MIN (HCC): ICD-10-CM

## 2020-05-21 DIAGNOSIS — I10 ESSENTIAL HYPERTENSION: ICD-10-CM

## 2020-05-21 DIAGNOSIS — I50.32 CHRONIC DIASTOLIC CONGESTIVE HEART FAILURE (HCC): ICD-10-CM

## 2020-05-21 LAB
ANION GAP SERPL CALCULATED.3IONS-SCNC: 5 MMOL/L (ref 4–13)
BUN SERPL-MCNC: 27 MG/DL (ref 5–25)
CALCIUM SERPL-MCNC: 9.8 MG/DL (ref 8.3–10.1)
CHLORIDE SERPL-SCNC: 102 MMOL/L (ref 100–108)
CO2 SERPL-SCNC: 31 MMOL/L (ref 21–32)
CREAT SERPL-MCNC: 1.17 MG/DL (ref 0.6–1.3)
GFR SERPL CREATININE-BSD FRML MDRD: 41 ML/MIN/1.73SQ M
GLUCOSE P FAST SERPL-MCNC: 83 MG/DL (ref 65–99)
POTASSIUM SERPL-SCNC: 3.8 MMOL/L (ref 3.5–5.3)
SODIUM SERPL-SCNC: 138 MMOL/L (ref 136–145)

## 2020-05-21 PROCEDURE — 99215 OFFICE O/P EST HI 40 MIN: CPT | Performed by: NURSE PRACTITIONER

## 2020-05-21 PROCEDURE — 3078F DIAST BP <80 MM HG: CPT | Performed by: NURSE PRACTITIONER

## 2020-05-21 PROCEDURE — 4040F PNEUMOC VAC/ADMIN/RCVD: CPT | Performed by: NURSE PRACTITIONER

## 2020-05-21 PROCEDURE — 93000 ELECTROCARDIOGRAM COMPLETE: CPT | Performed by: NURSE PRACTITIONER

## 2020-05-21 PROCEDURE — 1160F RVW MEDS BY RX/DR IN RCRD: CPT | Performed by: NURSE PRACTITIONER

## 2020-05-21 PROCEDURE — 1111F DSCHRG MED/CURRENT MED MERGE: CPT | Performed by: NURSE PRACTITIONER

## 2020-05-21 PROCEDURE — 36415 COLL VENOUS BLD VENIPUNCTURE: CPT

## 2020-05-21 PROCEDURE — 3075F SYST BP GE 130 - 139MM HG: CPT | Performed by: NURSE PRACTITIONER

## 2020-05-21 PROCEDURE — 80048 BASIC METABOLIC PNL TOTAL CA: CPT

## 2020-05-21 PROCEDURE — 1036F TOBACCO NON-USER: CPT | Performed by: NURSE PRACTITIONER

## 2020-05-21 PROCEDURE — 3008F BODY MASS INDEX DOCD: CPT | Performed by: NURSE PRACTITIONER

## 2020-05-21 RX ORDER — POTASSIUM CHLORIDE 20 MEQ/1
20 TABLET, EXTENDED RELEASE ORAL 2 TIMES DAILY
Qty: 60 TABLET | Refills: 1 | Status: SHIPPED | OUTPATIENT
Start: 2020-05-21 | End: 2020-07-22

## 2020-05-26 ENCOUNTER — TELEPHONE (OUTPATIENT)
Dept: CARDIOLOGY CLINIC | Facility: CLINIC | Age: 85
End: 2020-05-26

## 2020-06-03 ENCOUNTER — CLINICAL SUPPORT (OUTPATIENT)
Dept: CARDIOLOGY CLINIC | Facility: CLINIC | Age: 85
End: 2020-06-03
Payer: MEDICARE

## 2020-06-03 DIAGNOSIS — I48.0 PAROXYSMAL ATRIAL FIBRILLATION (HCC): ICD-10-CM

## 2020-06-03 PROCEDURE — 0298T PR EXT ECG > 48HR TO 21 DAY REVIEW AND INTERPRETATN: CPT | Performed by: INTERNAL MEDICINE

## 2020-06-05 ENCOUNTER — TELEPHONE (OUTPATIENT)
Dept: CARDIOLOGY CLINIC | Facility: CLINIC | Age: 85
End: 2020-06-05

## 2020-06-08 ENCOUNTER — TELEMEDICINE (OUTPATIENT)
Dept: RADIATION ONCOLOGY | Facility: HOSPITAL | Age: 85
End: 2020-06-08
Attending: RADIOLOGY

## 2020-06-08 DIAGNOSIS — C22.9 ADENOCARCINOMA DETERMINED BY BIOPSY OF LIVER (HCC): Primary | ICD-10-CM

## 2020-06-10 ENCOUNTER — OFFICE VISIT (OUTPATIENT)
Dept: CARDIOLOGY CLINIC | Facility: CLINIC | Age: 85
End: 2020-06-10
Payer: MEDICARE

## 2020-06-10 ENCOUNTER — TELEPHONE (OUTPATIENT)
Dept: CARDIOLOGY CLINIC | Facility: CLINIC | Age: 85
End: 2020-06-10

## 2020-06-10 VITALS
WEIGHT: 118 LBS | HEIGHT: 62 IN | BODY MASS INDEX: 21.71 KG/M2 | TEMPERATURE: 98.3 F | SYSTOLIC BLOOD PRESSURE: 138 MMHG | DIASTOLIC BLOOD PRESSURE: 70 MMHG | HEART RATE: 58 BPM

## 2020-06-10 DIAGNOSIS — Z95.2 HISTORY OF AORTIC VALVE REPLACEMENT: Chronic | ICD-10-CM

## 2020-06-10 DIAGNOSIS — E78.00 PURE HYPERCHOLESTEROLEMIA: Chronic | ICD-10-CM

## 2020-06-10 DIAGNOSIS — I49.5 SICK SINUS SYNDROME (HCC): Chronic | ICD-10-CM

## 2020-06-10 DIAGNOSIS — I10 ESSENTIAL HYPERTENSION: Chronic | ICD-10-CM

## 2020-06-10 DIAGNOSIS — I50.32 CHRONIC DIASTOLIC CONGESTIVE HEART FAILURE (HCC): Chronic | ICD-10-CM

## 2020-06-10 DIAGNOSIS — Z95.1 HX OF CABG: Chronic | ICD-10-CM

## 2020-06-10 DIAGNOSIS — I25.10 CORONARY ARTERY DISEASE INVOLVING NATIVE CORONARY ARTERY OF NATIVE HEART WITHOUT ANGINA PECTORIS: ICD-10-CM

## 2020-06-10 DIAGNOSIS — I48.0 PAROXYSMAL ATRIAL FIBRILLATION (HCC): Primary | Chronic | ICD-10-CM

## 2020-06-10 PROCEDURE — 3075F SYST BP GE 130 - 139MM HG: CPT | Performed by: INTERNAL MEDICINE

## 2020-06-10 PROCEDURE — 1160F RVW MEDS BY RX/DR IN RCRD: CPT | Performed by: INTERNAL MEDICINE

## 2020-06-10 PROCEDURE — 1036F TOBACCO NON-USER: CPT | Performed by: INTERNAL MEDICINE

## 2020-06-10 PROCEDURE — 99214 OFFICE O/P EST MOD 30 MIN: CPT | Performed by: INTERNAL MEDICINE

## 2020-06-10 PROCEDURE — 3008F BODY MASS INDEX DOCD: CPT | Performed by: INTERNAL MEDICINE

## 2020-06-10 PROCEDURE — 4040F PNEUMOC VAC/ADMIN/RCVD: CPT | Performed by: INTERNAL MEDICINE

## 2020-06-10 PROCEDURE — 3078F DIAST BP <80 MM HG: CPT | Performed by: INTERNAL MEDICINE

## 2020-07-02 ENCOUNTER — APPOINTMENT (OUTPATIENT)
Dept: RADIOLOGY | Facility: CLINIC | Age: 85
End: 2020-07-02
Payer: MEDICARE

## 2020-07-02 ENCOUNTER — CONSULT (OUTPATIENT)
Dept: PAIN MEDICINE | Facility: CLINIC | Age: 85
End: 2020-07-02
Payer: MEDICARE

## 2020-07-02 VITALS
HEART RATE: 76 BPM | HEIGHT: 62 IN | DIASTOLIC BLOOD PRESSURE: 72 MMHG | SYSTOLIC BLOOD PRESSURE: 136 MMHG | BODY MASS INDEX: 21.53 KG/M2 | TEMPERATURE: 97.9 F | WEIGHT: 117 LBS

## 2020-07-02 DIAGNOSIS — M54.16 LUMBAR RADICULOPATHY: ICD-10-CM

## 2020-07-02 DIAGNOSIS — Z79.01 CHRONIC ANTICOAGULATION: ICD-10-CM

## 2020-07-02 DIAGNOSIS — M54.16 LUMBAR RADICULOPATHY: Primary | ICD-10-CM

## 2020-07-02 PROCEDURE — 3075F SYST BP GE 130 - 139MM HG: CPT | Performed by: ANESTHESIOLOGY

## 2020-07-02 PROCEDURE — 1036F TOBACCO NON-USER: CPT | Performed by: ANESTHESIOLOGY

## 2020-07-02 PROCEDURE — 3008F BODY MASS INDEX DOCD: CPT | Performed by: ANESTHESIOLOGY

## 2020-07-02 PROCEDURE — 3078F DIAST BP <80 MM HG: CPT | Performed by: ANESTHESIOLOGY

## 2020-07-02 PROCEDURE — 99204 OFFICE O/P NEW MOD 45 MIN: CPT | Performed by: ANESTHESIOLOGY

## 2020-07-02 PROCEDURE — 72110 X-RAY EXAM L-2 SPINE 4/>VWS: CPT

## 2020-07-02 PROCEDURE — 1160F RVW MEDS BY RX/DR IN RCRD: CPT | Performed by: ANESTHESIOLOGY

## 2020-07-02 PROCEDURE — 4040F PNEUMOC VAC/ADMIN/RCVD: CPT | Performed by: ANESTHESIOLOGY

## 2020-07-02 RX ORDER — METHYLPREDNISOLONE 4 MG/1
TABLET ORAL
Qty: 21 TABLET | Refills: 0 | Status: SHIPPED | OUTPATIENT
Start: 2020-07-02 | End: 2020-07-20

## 2020-07-02 NOTE — PROGRESS NOTES
Assessment  1  Lumbar radiculopathy - Right    2  Chronic anticoagulation        Plan    At this point the patient's pain persists despite time, relative rest, activity modification, and nonsteroidal anti-inflammatories  Her pain is significantly interfering with her daily living activities  I believe is appropriate to order an MRI of the lumbar spine to rule out any significant etiology of her symptoms  I will start her on a titrating dose of oral methylprednisolone to address any inflammatory component of the patient's pain  She understands she should not take nonsteroidal anti-inflammatories until she is finished with this steroid  If she has any problems or questions she will give us a call  Once we obtain MRI results we will proceed from there  My impressions and treatment recommendations were discussed in detail with the patient who verbalized understanding and had no further questions  Discharge instructions were provided  I personally saw and examined the patient and I agree with the above discussed plan of care  This note is created using dictation transcription  It may contain typographical errors, grammatical errors, improperly dictated words, background noise and other errors  Orders Placed This Encounter   Procedures    X-ray lumbar spine complete 4+ views     Standing Status:   Future     Standing Expiration Date:   7/2/2024     Scheduling Instructions:      Bring along any outside films relating to this procedure   MRI lumbar spine without contrast     Standing Status:   Future     Standing Expiration Date:   7/2/2024     Scheduling Instructions: There is no preparation for this test  Please leave your jewelry and valuables at home, wedding rings are the exception  Magnetic nail polish must be removed prior to arrival for your test  Please bring your insurance cards, a form of photo ID and a list of your medications with you   Arrive 15 minutes prior to your appointment time in order to register  Please bring any prior CT or MRI studies of this area that were not performed at a [facility] facility  To schedule this appointment, please contact Central Scheduling at [phone]  Order Specific Question:   What is the patient's sedation requirement? Answer:   No Sedation     New Medications Ordered This Visit   Medications    methylPREDNISolone 4 MG tablet therapy pack     Sig: Use as directed on package     Dispense:  21 tablet     Refill:  0       History of Present Illness    Beverly Maxwell is a 80 y o  female longstanding history but worsening low back and right leg pain for the past six weeks  She is unaware of any clear precipitating event denies any trauma or injury  Pain is moderate to severe rates as 9/10 on the visual analog scale significant interfering with daily living activities  It is nearly constant worse at night describes sharp achy with a burning and shooting sensation  She has subjective weakness of her lower limbs  Lying down and sitting decreases symptoms will standing, bending, sitting and walking aggravate them  Exercise TENS unit heat nice have provided no relief were not physical therapy in the past provided moderate relief  She denies any bowel or bladder dysfunction recent fever chills or respiratory issues  I have personally reviewed and/or updated the patient's past medical history, past surgical history, family history, social history, current medications, allergies, and vital signs today  Review of Systems   Constitutional: Positive for unexpected weight change  Negative for fever  HENT: Negative for trouble swallowing  Eyes: Positive for visual disturbance  Respiratory: Negative for shortness of breath and wheezing  Cardiovascular: Positive for palpitations  Negative for chest pain  Gastrointestinal: Positive for constipation and nausea  Negative for diarrhea and vomiting     Endocrine: Negative for cold intolerance, heat intolerance and polydipsia  Genitourinary: Positive for difficulty urinating  Negative for frequency  Musculoskeletal: Positive for gait problem  Negative for arthralgias, joint swelling (joint stiffness) and myalgias  Skin: Negative for rash  Neurological: Positive for dizziness and weakness  Negative for seizures, syncope and headaches  Hematological: Bruises/bleeds easily  Psychiatric/Behavioral: Negative for dysphoric mood  All other systems reviewed and are negative        Patient Active Problem List   Diagnosis    Hx of CABG    Essential hypertension    Hyperlipidemia    Paroxysmal atrial fibrillation (HCC)    History of aortic valve replacement    Acute cystitis with hematuria    Arteriosclerosis of carotid artery    Cervical spine arthritis    Chronic cervical radiculopathy    Dystrophy of vulva    Mixed stress and urge urinary incontinence    Neural foraminal stenosis of cervical spine    Occipital neuralgia of right side    Osteoarthritis    Peripheral polyneuropathy    Bradycardia    Gastroesophageal reflux disease with esophagitis    Post herpetic neuralgia    Lower extremity edema    Pes anserinus bursitis of left knee    CKD (chronic kidney disease) stage 3, GFR 30-59 ml/min (HCC)    Sick sinus syndrome (HCC)    Abnormal CT of the abdomen    Hepatic lesion    Chronic diastolic congestive heart failure (HCC)    Anemia    Hypokalemia    [facility]    Coronary artery disease involving native coronary artery of native heart without angina pectoris       Past Medical History:   Diagnosis Date    Acute myocardial infarction ([facility] [address] )     Aortic valve disease     Arthritis     Atrial fibrillation ([facility] [address] )     CAD (coronary artery disease)     Cancer (HCC)     skin cancer removed from left leg    Cellulitis      AND ABSCESS    Cervical stenosis of spine     CHF (congestive heart failure) ([facility] [address] )     Cholecystitis     Closed Colles' fracture     OF THE LEFT WRIST; LAST ASSESSED: 5/2/14    Closed fracture of radius     LAST ASSESSED: 2/19/14    Diverticulosis     Dyspnea     Hair loss disorder     Hematuria     History of being hospitalized     1/5/12-1/14/12 Juan Diego MACIEL PROCEDURES: 1) REDO STERNOTOMY AND AORTIC VALVE REPLACEMENT WITH A 19-MM REBA-PETERS BOVINIE PERICARDIAL MAGNA EASE VALVE 2) TRANSESOPHAGEAL ECHOCARDIOGRAM     Hypercholesterolemia     Hypertension     Incomplete bladder emptying     Leg wound, left     Liver cancer (HCC)     Malaise and fatigue     Renal disorder     Sick sinus syndrome ([facility] [address] )     Urinary tract infection        Past Surgical History:   Procedure Laterality Date    AORTIC VALVE REPLACEMENT  01/2012    HEART VALVE    CARDIAC SURGERY      CATARACT EXTRACTION W/  INTRAOCULAR LENS IMPLANT  08/26/2010    Roselia Smith MD; PHACOEMULISIFICATION; INSERTION INTRAOCULAR LENS OD    COLONOSCOPY  03/08/2007    CORONARY ARTERY BYPASS GRAFT  01/2005    IR IMAGE GUIDED BIOPSY/ASPIRATION LIVER  3/25/2020    LEG SURGERY      [place] KNEE SCOPE,MED/LAT MENISECTOMY Left 9/23/2019    Procedure: ARTHROSCOPY KNEE, LEFT PARTIAL MEDIAL MENISCECTOMY;  Surgeon: Kira Marquez MD;  Location:  MAIN OR;  Service: Orthopedics       Family History   Problem Relation Age of Onset    Diabetes Mother         MELLITUS    Hypertension Mother     Heart disease Mother     Prostate cancer Father     Dementia Brother     Heart disease Maternal Grandmother     Hypertension Maternal Grandmother     Heart disease Maternal Grandfather     Hypertension Family     Osteoporosis Family     Substance Abuse Neg Hx     Mental illness Neg Hx        Social History     Occupational History    Occupation: RETIRED   Tobacco Use    Smoking status: Former Smoker    Smokeless tobacco: Never Used    Tobacco comment: QUIT 40 YEARS AGO ALSO NEVER A SMOKER AS PER [employer]   Substance and Sexual Activity    Alcohol use: Not Currently     Frequency: Never     Comment: social    Drug use: No    Sexual activity: Not Currently       Current Outpatient Medications on File Prior to Visit   Medication Sig    acetaminophen (TYLENOL) 500 mg tablet Take 1,000 mg by mouth every 6 (six) hours as needed for mild pain    amLODIPine (NORVASC) 2 5 mg tablet Take 1 tablet (2 5 mg total) by mouth daily    apixaban (Eliquis) 5 mg Take 1 tablet (5 mg total) by mouth 2 (two) times a day    atorvastatin (LIPITOR) 20 mg tablet Take 1 tablet (20 mg total) by mouth every evening    clotrimazole-betamethasone (LOTRISONE) 1-0 05 % cream Apply topically 2 (two) times a day    Coenzyme Q10 (COQ10) 100 MG CAPS Take 1 capsule by mouth Daily    conjugated estrogens (PREMARIN) vaginal cream Apply a pea size amount of the estrogen cream to the opening of the vagina three nights per week   gabapentin (NEURONTIN) 100 mg capsule Take 2 capsules (200 mg total) by mouth 2 (two) times a day Increased dose 2/26/20 (Patient taking differently: Take 200 mg by mouth daily Increased dose 2/26/20)    Multiple Vitamins-Minerals (OCUVITE EXTRA) TABS Take 1 tablet by mouth daily    Omega-3 1000 MG CAPS Take by mouth    polyethylene glycol (GLYCOLAX) powder Take 17 g by mouth daily    potassium chloride (K-DUR,KLOR-CON) 20 mEq tablet Take 1 tablet (20 mEq total) by mouth 2 (two) times a day    senna-docusate sodium (SENOKOT S) 8 6-50 mg per tablet Take 1 tablet by mouth daily    torsemide (DEMADEX) 20 mg tablet TAKE ONE TABLET ONCE DAILY (Patient taking differently: Take 20 mg by mouth as needed )     No current facility-administered medications on file prior to visit          Allergies   Allergen Reactions    Heparin      Annotation - [id number]: LOW PLATELETS    Phenazopyridine      As per daughter "Not appropriate for pt due to heart condition"       Physical Exam    /72 (BP Location: Left arm, Patient Position: Sitting, Cuff Size: Standard)   Pulse 76   Temp 97 9 °F (36 6 °C)   Ht 5' 2" (1 575 m)   Wt 53 1 kg (117 lb)   LMP  (LMP Unknown)   BMI 21 40 kg/m²     Constitutional: normal, well developed, well nourished, alert, in no distress and non-toxic and no overt pain behavior    Eyes: anicteric  HEENT: grossly intact  Neck: supple, symmetric, trachea midline and no masses   Pulmonary:even and unlabored  Cardiovascular:No edema or pitting edema present  Skin:Normal without rashes or lesions and well hydrated  Psychiatric:Mood and affect appropriate  Neurologic:Cranial Nerves II-XII grossly intact  Musculoskeletal:normal, difficulty going from sitting to standing sitting position and venous stasis changes of the lower limbs no obvious skin lesions or erythema lumbar sacral spine no tenderness to palpation lumbar sacral spine spinous process sacroiliac joint or greater trochanter bilateral, deep tendon reflexes are diminished but symmetrical bilateral patella Achilles, no focal motor deficit appreciated lower limbs

## 2020-07-02 NOTE — PATIENT INSTRUCTIONS
Lumbar Radiculopathy   WHAT YOU NEED TO KNOW:   What is lumbar radiculopathy? Lumbar radiculopathy is a painful condition that happens when a nerve in your lumbar spine (lower back) is pinched or irritated  Nerves control feeling and movement in your body  What causes lumbar radiculopathy? You may get a pinched nerve in your lumbar spine if you have disc damage  Discs are natural, spongy cushions between your vertebrae (back bones) that allow your spine to move  Your discs may move out of place and pinch the nerve in your spine  Your risk for a pinched nerve and lumbar radiculopathy increases if:  · You smoke  · You have diabetes, a spinal infection, or a growth in your spine  · You are overweight  · You are male  · You are elderly  What are the signs and symptoms of lumbar radiculopathy? You may have any of the following:  · Pain that moves from your lower back to your buttocks, groin, and the back of your leg  The pain is often felt below your knee  Your pain may worsen when you cough, sneeze, stand, or sit  · Numbness, weakness, or tingling in your back or legs  How is lumbar radiculopathy diagnosed? Your healthcare provider will examine you and ask about your family history of back and leg pain  He may also test you for weakness, numbness, or tingling in your back, buttocks, and legs  Your healthcare provider may ask you to lie on your back and lift your leg to locate your pain  You may have any of the following:  · Blood tests: You may need blood taken to give caregivers information about how your body is working  The blood may be taken from your hand, arm, or IV  · Magnetic resonance imaging (MRI): An MRI machine is used to take a picture of your lower back  Your healthcare provider will use this picture to check for problems and changes in your back bones, nerves, and discs  You will need to lie still during this test  The MRI machine contains a very powerful magnet    Never enter the MRI room with any metal objects  This can cause serious injury  Tell your healthcare provider if you have any metal implants in your body  · X-ray: An x-ray is a picture of your lower back  A lumbar x-ray may show signs of infection or other problems with your spine  · An electromyography (EMG)  test measures the electrical activity of your muscles at rest and with movement  · Computed tomography (CT) scan: A special x-ray machine uses a computer to take pictures of your lower back  It may be used to look at your bones, discs, and nerves  You may be given dye in your IV to help improve the pictures  Tell your healthcare provider if you are allergic to shellfish, or have other allergies or medical conditions  People who are allergic to iodine or shellfish (lobster, crab, or shrimp) may be allergic to some dyes  How is lumbar radiculopathy treated? Treatment of lumbar radiculopathy may reduce pain and swelling, and improve your ability to walk and do your normal activities  Ask your healthcare provider for more information about these and other treatments for lumbar radiculopathy:  · Medicines:     ¨ NSAIDs , such as ibuprofen, help decrease swelling, pain, and fever  This medicine is available with or without a doctor's order  NSAIDs can cause stomach bleeding or kidney problems in certain people  If you take blood thinner medicine, always ask your healthcare provider if NSAIDs are safe for you  Always read the medicine label and follow directions  ¨ Muscle relaxers  help decrease pain and muscle spasms  ¨ Opioids: This is a strong medicine given to reduce severe pain  It is also called narcotic pain medicine  Take this medicine exactly as directed by your healthcare provider  ¨ Oral steroids: Steroids may be given to reduce swelling and pain  ¨ Steroid injections: Healthcare providers may give you steroid medicine through a needle (shot) into your lumbar spine   This may help decrease your nerve pain and swelling  You may need more than 1 injection if your symptoms do not improve after the first treatment  · Physical therapy: Your healthcare provider may suggest physical therapy  Your physical therapist may teach you certain exercises to improve posture (the way you stand and sit), flexibility, and strength in your lower back  He may also teach you how to remain safely active and avoid further injury  Follow the exercise plan given to you by your physical therapist     · Transcutaneous electrical nerve stimulation: This treatment, called TENS, stimulates your nerves and may decrease your pain  Wires are attached to pads  The pads are attached to your skin  The wires send a mild current through your nerves  · Surgery: You may need surgery to relieve your pinched nerve if your condition has not improved within 4 to 6 weeks  You may also need it if you have lumbar radiculopathy more than once  What are the risks of treatment for lumbar radiculopathy? · Without treatment, your pain may worsen  The pinched and swollen nerve may lead to problems when you walk or move  In severe cases, you may lose control of your urine or bowel movements  Bedrest can make your symptoms worse  · Pain medicines can cause vomiting, upset stomach, constipation (large, hard bowel movements that are difficult to pass), or kidney or liver problems  Opioid medicine may be addictive (hard to quit taking once you start)  Oral steroids and steroid injections may have side effects, such as facial redness, fluid retention (water weight gain), and mood changes  Steroid injections may be painful and can cause severe headaches, infections, allergic reactions, or nerve damage  Surgery risks include delayed problems with healing, spinal or bladder infection, damage to the spinal cord or other nerves, and ongoing pain  In rare cases, you could become paralyzed (not able to move your arms or legs)    How can I care for myself when I have lumbar radiculopathy? · Stay active: It is best to be active when you have lumbar radiculopathy  Your healthcare provider may tell you to take walks to ease yourself back into your daily routine  Avoid long periods of bed rest  Bed rest could worsen your symptoms  Do not move in ways that increase your pain  Ask your healthcare provider for more information about the best ways to stay active  · Use ice or heat packs:  Use ice or heat packs on the sore area of your body to decrease the pain and swelling  Put ice in a plastic bag covered with a towel on your low back  Cover heated items with a towel to avoid burns  Use ice and heat as directed  · Avoid heavy lifting: Your condition may worsen if you lift heavy things  Avoid lifting if possible  · Maintain a healthy weight:  Excess body weight may strain your back  Talk with your healthcare provider about ways to lose excess weight if you are overweight  When should I contact my healthcare provider? · Your pain does not improve within 1 to 3 weeks after treatment  · Your pain and weakness keep you from your normal activities at work, home, or school  · You lose more than 10 pounds in 6 months without trying  · You become depressed or sad because of the pain  · You have questions or concerns about your condition or care  When should I seek immediate care or call 911? · You have a fever greater than 100 4°F for longer than 2 days  · You have new, severe back or leg pain, or your pain spreads to both legs  · You have any new signs of numbness or weakness, especially in your lower back, legs, arms, or genital area  · You have new trouble controlling your urine and bowel movements  · You do not feel like your bladder empties when you urinate  CARE AGREEMENT:   You have the right to help plan your care  Learn about your health condition and how it may be treated   Discuss treatment options with your caregivers to decide what care you want to receive  You always have the right to refuse treatment  The above information is an  only  It is not intended as medical advice for individual conditions or treatments  Talk to your doctor, nurse or pharmacist before following any medical regimen to see if it is safe and effective for you  © 2017 [employer] [employer] Information is for End User's use only and may not be sold, redistributed or otherwise used for commercial purposes  All illustrations and images included in CareNotes® are the copyrighted property of A D A M , Inc  or Nilesh Loomis

## 2020-07-07 ENCOUNTER — TELEPHONE (OUTPATIENT)
Dept: PAIN MEDICINE | Facility: CLINIC | Age: 85
End: 2020-07-07

## 2020-07-07 NOTE — TELEPHONE ENCOUNTER
S/w May Simon, per medical communication consent on file  Advised of above  Per May Simon, pt stated that she felt good x the last 2 days - today, pt c/o increased pain  May Simon feels that the pt may have "over done it" and is now having pain  Per May Simon, 2 days of oral steroid remain  Will cb with an update on the pt's pain at the end of the week  Per May Simon, pt is scheduled for an mri on Wednesday, 7/15  Ana Knapp, this office will cb when results are available       X-ray report faxed to [phone] for pt review per [facility] request

## 2020-07-07 NOTE — TELEPHONE ENCOUNTER
----- Message from Anel Montgomery DO sent at 7/7/2020 11:41 AM EDT -----  Patient's lumbar radiographs revealMild lumbar dextroscoliosis  Grade 1 anterolisthesis L4 on L5, similar      Degenerative disc disease L2-3, progressed from prior study  Lower lumbar facet arthropathy        How she doing after the oral steroids

## 2020-07-08 ENCOUNTER — HOSPITAL ENCOUNTER (OUTPATIENT)
Dept: MRI IMAGING | Facility: HOSPITAL | Age: 85
Discharge: HOME | End: 2020-07-08
Attending: ANESTHESIOLOGY
Payer: MEDICARE

## 2020-07-08 DIAGNOSIS — M54.16 LUMBAR RADICULOPATHY: ICD-10-CM

## 2020-07-08 PROCEDURE — 72148 MRI LUMBAR SPINE W/O DYE: CPT

## 2020-07-09 ENCOUNTER — TELEPHONE (OUTPATIENT)
Dept: PAIN MEDICINE | Facility: CLINIC | Age: 85
End: 2020-07-09

## 2020-07-09 DIAGNOSIS — M54.16 LUMBAR RADICULOPATHY: ICD-10-CM

## 2020-07-09 DIAGNOSIS — M71.38 SYNOVIAL CYST OF LUMBAR FACET JOINT: Primary | ICD-10-CM

## 2020-07-09 DIAGNOSIS — M48.061 LUMBAR FORAMINAL STENOSIS: ICD-10-CM

## 2020-07-09 NOTE — TELEPHONE ENCOUNTER
Order for Neurosurgery specifically Dr Anibal Young in [facility]      Order for transforaminal epidural steroid injection in epic please coordinate with PCP regarding Eliquis

## 2020-07-09 NOTE — TELEPHONE ENCOUNTER
----- Message from Anel Montgomery DO sent at 7/9/2020 10:47 AM EDT -----  Patient's MRI reveals synovial cyst in the right L5-S1 facet affecting the right L5 nerve root  Also reveals moderate T11 compression deformity that could be subacute, if she having mid back pain her mostly pain in her low back and down leg      If the patient's pain persists would consider right L5 TFESI,  patient is on anticoagulation    A could also refer patient to Neurosurgery for minimally invasive removal of facet cyst

## 2020-07-09 NOTE — TELEPHONE ENCOUNTER
S/w Lo Oliveros per medical communication consent on file  Verl Legions of above  Lo Oliveros verbalized agreement, confirmed eliquis per Dr Lianet Schwarz  [facility] Dr Lianet Schwarz re eliquis hold  Will cb with neurosurgery referral and Dr Elkin Dailey response when available  Lo Oliveros verbalized understanding and appreciation  Will cb if questions / concerns arise  Per Lo Oliveros, pt is on her last day, 2 days of steroids  Pain is returning   Verl Legions, will make SL aware

## 2020-07-09 NOTE — TELEPHONE ENCOUNTER
Chase from radiology called stating theres significant findings for patients MRI results  Their in Epic for review       Call  Back# [phone]

## 2020-07-09 NOTE — TELEPHONE ENCOUNTER
Request for ashly ann faxed to Dr Lianet Schwarz for review  Left a detailed message on machine per medical communication consent advising of above  Provided Dr Leonel Tubbs name and contact info  Will cb when a response from Dr Lianet Schwarz is available  Provided cb number and office hours for questions / concerns

## 2020-07-10 ENCOUNTER — TELEPHONE (OUTPATIENT)
Dept: FAMILY MEDICINE CLINIC | Facility: HOSPITAL | Age: 85
End: 2020-07-10

## 2020-07-10 ENCOUNTER — APPOINTMENT (OUTPATIENT)
Dept: LAB | Facility: HOSPITAL | Age: 85
End: 2020-07-10
Attending: INTERNAL MEDICINE
Payer: MEDICARE

## 2020-07-10 ENCOUNTER — TELEPHONE (OUTPATIENT)
Dept: PAIN MEDICINE | Facility: CLINIC | Age: 85
End: 2020-07-10

## 2020-07-10 DIAGNOSIS — N39.0 URINARY TRACT INFECTION WITHOUT HEMATURIA, SITE UNSPECIFIED: Primary | ICD-10-CM

## 2020-07-10 LAB
BACTERIA UR QL AUTO: ABNORMAL /HPF
BILIRUB UR QL STRIP: NEGATIVE
CLARITY UR: ABNORMAL
COLOR UR: YELLOW
GLUCOSE UR STRIP-MCNC: NEGATIVE MG/DL
HGB UR QL STRIP.AUTO: ABNORMAL
KETONES UR STRIP-MCNC: NEGATIVE MG/DL
LEUKOCYTE ESTERASE UR QL STRIP: NEGATIVE
NITRITE UR QL STRIP: NEGATIVE
NON-SQ EPI CELLS URNS QL MICRO: ABNORMAL /HPF
PH UR STRIP.AUTO: 6.5 [PH]
PROT UR STRIP-MCNC: NEGATIVE MG/DL
RBC #/AREA URNS AUTO: ABNORMAL /HPF
SP GR UR STRIP.AUTO: 1.01 (ref 1–1.03)
UROBILINOGEN UR QL STRIP.AUTO: 0.2 E.U./DL
WBC #/AREA URNS AUTO: ABNORMAL /HPF

## 2020-07-10 PROCEDURE — 81001 URINALYSIS AUTO W/SCOPE: CPT

## 2020-07-10 NOTE — TELEPHONE ENCOUNTER
S/w Yariel Felling, advised of above  Per Yariel Felling, pt w/ suspected uti  Urine sample was dropped off today  Advised Sheba to cb when the results are available  Yariel Felling verbalized understanding  Will cb as discussed

## 2020-07-10 NOTE — TELEPHONE ENCOUNTER
Faxed MRI report to [facility] PT  Mailed release & medical cc for update  Will scan into chart when received

## 2020-07-11 ENCOUNTER — TELEPHONE (OUTPATIENT)
Dept: OTHER | Facility: OTHER | Age: 85
End: 2020-07-11

## 2020-07-13 ENCOUNTER — TELEPHONE (OUTPATIENT)
Dept: UROLOGY | Facility: MEDICAL CENTER | Age: 85
End: 2020-07-13

## 2020-07-13 DIAGNOSIS — R10.9 FLANK PAIN: Primary | ICD-10-CM

## 2020-07-13 NOTE — TELEPHONE ENCOUNTER
Called Bakari Fernandes back and told her that KUB was ordered and she should go to the [facility] to have it done

## 2020-07-13 NOTE — TELEPHONE ENCOUNTER
Pt managed by Rip Steinberg ,pt's daughter calling for appointment sooner than later states pt having lower back pain and had recent urine testing which showed blood in the urine,pt is scheduled 07/16/20 Pain Mgt and would like stones ruled out prior

## 2020-07-13 NOTE — TELEPHONE ENCOUNTER
S/w Mohini Sow, confirmed no uti per Dr Zakia Tian, on call  over the weekend  Bobby Bobo, pt should arrive at 12:50 on thurs 7/16/2020  No eliquis 7/13 - 7/16 post procedure  Mohini Sow confirmed pt has not taken her eliquis today in hopes of scheduling this procedure  Reviewed pre procedure instructions; eat a light meal - npo 1 hour prior,  - may not enter the building, mask must be worn while on the property, loose fitting clothing, cb if illness / abs present, injected steroid may affect immunity  Pt should practice strict handwashing, masking, social distancing / isolate whenever possible x 2 - 3 weeks after procedure  Pt verbalized understanding  Will cb if questions / concerns arise

## 2020-07-13 NOTE — TELEPHONE ENCOUNTER
Called [facility] Micaela Monique has been having severe back pain for 3 weeks  She is scheduled for pain management 7/16  Her PCP sent for urine and showed microscopic blood  Wondering if it coudl be a kidney stone vs having injections with pain management  Is ordering a KUB or Ct scan acceptable?  Please advise

## 2020-07-14 ENCOUNTER — TELEPHONE (OUTPATIENT)
Dept: PAIN MEDICINE | Facility: CLINIC | Age: 85
End: 2020-07-14

## 2020-07-14 ENCOUNTER — TELEPHONE (OUTPATIENT)
Dept: FAMILY MEDICINE CLINIC | Facility: HOSPITAL | Age: 85
End: 2020-07-14

## 2020-07-14 NOTE — TELEPHONE ENCOUNTER
Spoke to Remberto Kathleen  pts mom having severe back pain past 2 wks  Took her mom to see dr delgadillo per dr humberto delgadillo ordered MRI  Mri showed cyst on spine and he referred pt to neurosurgery for eval   Seeing neuro surg on mon 7/20/20 at [facility]  Seeing dr delgadillo for injection on thur 7/16  In the interim, pt had 1 day of urinary freq and called here and got a UA/CS order from Kajal Nguyen got a call from pww here on sat 7/11 who is covering dr ernandez messages that there is NO uti, but does have some blood in urine and to see uro  zeynep called uro who ordered KUB study  zeynep wanted to know if this KUB could wait until after injection on 7/16 and appt with neuro surg on 7/20  I told zeynep to call uro as they ordered test and I could not tell her yes or no   daugh will call uro  Pamela Sanchez did say it is hard to transport her mom often to all these appts that is why she wanted to wait on the KUB    dk

## 2020-07-14 NOTE — TELEPHONE ENCOUNTER
Pt's daughter Sneha Billingsley calling  Stated that the appt w/ Dr Jeyson Moreno has been moved to Monday as a priority  Questioning if the injection on thursday will interfere with this at all? Shelley manjarrez w/ Dr Bella Costa as the writer is not sure what his eval / tx will include  Pt stated that dr Frances Madera referred the question back to [facility]  Torrie owen - elliot w/ the injection if the pt continues w/ pain   Randy Ramos verbalized understanding and confirmed injection of 7/16/2020

## 2020-07-15 ENCOUNTER — HOSPITAL ENCOUNTER (OUTPATIENT)
Dept: RADIOLOGY | Facility: HOSPITAL | Age: 85
Discharge: HOME | End: 2020-07-15
Payer: MEDICARE

## 2020-07-15 DIAGNOSIS — R10.9 FLANK PAIN: ICD-10-CM

## 2020-07-15 PROCEDURE — 74018 RADEX ABDOMEN 1 VIEW: CPT

## 2020-07-16 ENCOUNTER — HOSPITAL ENCOUNTER (OUTPATIENT)
Dept: RADIOLOGY | Facility: CLINIC | Age: 85
Discharge: HOME | End: 2020-07-16
Attending: ANESTHESIOLOGY | Admitting: ANESTHESIOLOGY
Payer: MEDICARE

## 2020-07-16 VITALS
OXYGEN SATURATION: 98 % | HEART RATE: 82 BPM | RESPIRATION RATE: 20 BRPM | DIASTOLIC BLOOD PRESSURE: 76 MMHG | SYSTOLIC BLOOD PRESSURE: 181 MMHG | TEMPERATURE: 99.4 F

## 2020-07-16 DIAGNOSIS — M71.38 SYNOVIAL CYST OF LUMBAR FACET JOINT: ICD-10-CM

## 2020-07-16 DIAGNOSIS — M54.16 LUMBAR RADICULOPATHY: ICD-10-CM

## 2020-07-16 DIAGNOSIS — M48.061 LUMBAR FORAMINAL STENOSIS: ICD-10-CM

## 2020-07-16 PROCEDURE — 64483 NJX AA&/STRD TFRM EPI L/S 1: CPT | Performed by: ANESTHESIOLOGY

## 2020-07-16 RX ORDER — METHYLPREDNISOLONE ACETATE 80 MG/ML
80 INJECTION, SUSPENSION INTRA-ARTICULAR; INTRALESIONAL; INTRAMUSCULAR; PARENTERAL; SOFT TISSUE ONCE
Status: COMPLETED | OUTPATIENT
Start: 2020-07-16 | End: 2020-07-16

## 2020-07-16 RX ORDER — LIDOCAINE HYDROCHLORIDE 10 MG/ML
5 INJECTION, SOLUTION EPIDURAL; INFILTRATION; INTRACAUDAL; PERINEURAL ONCE
Status: COMPLETED | OUTPATIENT
Start: 2020-07-16 | End: 2020-07-16

## 2020-07-16 RX ADMIN — LIDOCAINE HYDROCHLORIDE 2 ML: 20 INJECTION, SOLUTION EPIDURAL; INFILTRATION; INTRACAUDAL at 13:13

## 2020-07-16 RX ADMIN — METHYLPREDNISOLONE ACETATE 80 MG: 80 INJECTION, SUSPENSION INTRA-ARTICULAR; INTRALESIONAL; INTRAMUSCULAR; SOFT TISSUE at 13:23

## 2020-07-16 RX ADMIN — IOHEXOL 1 ML: 300 INJECTION, SOLUTION INTRAVENOUS at 13:13

## 2020-07-16 RX ADMIN — LIDOCAINE HYDROCHLORIDE 3 ML: 10 INJECTION, SOLUTION EPIDURAL; INFILTRATION; INTRACAUDAL; PERINEURAL at 13:11

## 2020-07-16 NOTE — H&P
History of Present Illness: The patient is a 80 y o  female who presents with complaints of low back and leg pain      Patient Active Problem List   Diagnosis    Hx of CABG    Essential hypertension    Hyperlipidemia    Paroxysmal atrial fibrillation (HCC)    History of aortic valve replacement    Acute cystitis with hematuria    Arteriosclerosis of carotid artery    Cervical spine arthritis    Chronic cervical radiculopathy    Dystrophy of vulva    Mixed stress and urge urinary incontinence    Neural foraminal stenosis of cervical spine    Occipital neuralgia of right side    Osteoarthritis    Peripheral polyneuropathy    Bradycardia    Gastroesophageal reflux disease with esophagitis    Post herpetic neuralgia    Lower extremity edema    Pes anserinus bursitis of left knee    CKD (chronic kidney disease) stage 3, GFR 30-59 ml/min ([facility])    Sick sinus syndrome (HCC)    Abnormal CT of the abdomen    Hepatic lesion    Chronic diastolic congestive heart failure (HCC)    Anemia    Hypokalemia    [facility]en[facility]    Coronary artery disease involving native coronary artery of native heart without angina pectoris    Lumbar foraminal stenosis    Lumbar radiculopathy       Past Medical History:   Diagnosis Date    [facility] infarction [facility]     Aortic valve disease     Arthritis     [facility]rial fibrillation [facility]     CAD (coronary artery disease)     Cancer (HCC)     skin cancer removed from left leg    Cellulitis      AND ABSCESS    Cervical stenosis of spine     CHF (congestive heart failure) ([facility])     Cholecystitis     Closed Colles' fracture     OF THE LEFT WRIST; LAST ASSESSED: 5/2/14    Closed fracture of radius     LAST ASSESSED: 2/19/14    Diverticulosis     Dyspnea     Hair loss disorder     Hematuria     History of being hospitalized     1/5/12-1/14/12 Juan Diego MACIEL PROCEDURES: 1) REDO STERNOTOMY AND AORTIC VALVE REPLACEMENT WITH A 19-MM REBA-PETERS BOVINIE PERICARDIAL MAGNA EASE VALVE 2) TRANSESOPHAGEAL ECHOCARDIOGRAM     Hypercholesterolemia     Hypertension     Incomplete bladder emptying     Leg wound, left     Liver cancer (HCC)     Malaise and fatigue     Renal disorder     Sick sinus syndrome (HCC)     Urinary tract infection        Past Surgical History:   Procedure Laterality Date    AORTIC VALVE REPLACEMENT  01/2012    HEART VALVE    CARDIAC SURGERY      CATARACT EXTRACTION W/  INTRAOCULAR LENS IMPLANT  08/26/2010    Amber Hodge MD; PHACOEMULISIFICATION; INSERTION INTRAOCULAR LENS OD    COLONOSCOPY  03/08/2007    CORONARY ARTERY BYPASS GRAFT  01/2005    IR IMAGE GUIDED BIOPSY/ASPIRATION LIVER  3/25/2020    LEG SURGERY      WY KNEE SCOPE,MED/LAT MENISECTOMY Left 9/23/2019    Procedure: ARTHROSCOPY KNEE, LEFT PARTIAL MEDIAL MENISCECTOMY;  Surgeon: Jake Green MD;  Location:  MAIN OR;  Service: Orthopedics         Current Outpatient Medications:     acetaminophen (TYLENOL) 500 mg tablet, Take 1,000 mg by mouth every 6 (six) hours as needed for mild pain, Disp: , Rfl:     amLODIPine (NORVASC) 2 5 mg tablet, Take 1 tablet (2 5 mg total) by mouth daily, Disp: 90 tablet, Rfl: 3    apixaban (Eliquis) 5 mg, Take 1 tablet (5 mg total) by mouth 2 (two) times a day, Disp: 180 tablet, Rfl: 3    atorvastatin (LIPITOR) 20 mg tablet, Take 1 tablet (20 mg total) by mouth every evening, Disp: 90 tablet, Rfl: 3    clotrimazole-betamethasone (LOTRISONE) 1-0 05 % cream, Apply topically 2 (two) times a day, Disp: 30 g, Rfl: 3    Coenzyme Q10 (COQ10) 100 MG CAPS, Take 1 capsule by mouth Daily, Disp: , Rfl:     conjugated estrogens (PREMARIN) vaginal cream, Apply a pea size amount of the estrogen cream to the opening of the vagina three nights per week , Disp: , Rfl:     gabapentin (NEURONTIN) 100 mg capsule, Take 2 capsules (200 mg total) by mouth 2 (two) times a day Increased dose 2/26/20 (Patient taking differently: Take 200 mg by mouth daily Increased dose 2/26/20), Disp: 180 capsule, Rfl: 3    methylPREDNISolone 4 MG tablet therapy pack, Use as directed on package, Disp: 21 tablet, Rfl: 0    Multiple Vitamins-Minerals (OCUVITE EXTRA) TABS, Take 1 tablet by mouth daily, Disp: , Rfl:     Omega-3 1000 MG CAPS, Take by mouth, Disp: , Rfl:     polyethylene glycol (GLYCOLAX) powder, Take 17 g by mouth daily, Disp: , Rfl:     potassium chloride (K-DUR,KLOR-CON) 20 mEq tablet, Take 1 tablet (20 mEq total) by mouth 2 (two) times a day, Disp: 60 tablet, Rfl: 1    senna-docusate sodium (SENOKOT S) 8 6-50 mg per tablet, Take 1 tablet by mouth daily, Disp: 9 tablet, Rfl: 0    torsemide (DEMADEX) 20 mg tablet, TAKE ONE TABLET ONCE DAILY (Patient taking differently: Take 20 mg by mouth as needed ), Disp: 30 tablet, Rfl: 6    Current Facility-Administered Medications:     iohexol (OMNIPAQUE) 300 mg/mL injection 50 mL, 50 mL, Epidural, Once, Jeffrey Colon DO    lidocaine (PF) (XYLOCAINE-MPF) 1 % injection 5 mL, 5 mL, Other, Once, Jeffrey Colon,     lidocaine (PF) (XYLOCAINE-MPF) 2 % injection 5 mL, 5 mL, Epidural, Once, Jeffrey Colon DO    methylPREDNISolone acetate (DEPO-MEDROL) injection 80 mg, 80 mg, Epidural, Once, Jeffrey Colon,     Allergies   Allergen Reactions    Heparin      Annotation - [id number]: LOW PLATELETS    Phenazopyridine      As per daughter "Not appropriate for pt due to heart condition"       Physical Exam:   General: Awake, Alert, Oriented x 3, Mood and affect appropriate  Respiratory: Respirations even and unlabored  Cardiovascular: Peripheral pulses intact; no edema  Musculoskeletal Exam:  Decreased range of motion lumbar spine    ASA Score: II         Assessment:   1  Synovial cyst of lumbar facet joint    2  Lumbar radiculopathy    3   Lumbar foraminal stenosis        Plan: Right L5 TFESI

## 2020-07-16 NOTE — DISCHARGE INSTRUCTIONS
Epidural Steroid Injection   WHAT YOU NEED TO KNOW:   An epidural steroid injection (LATONYA) is a procedure to inject steroid medicine into the epidural space  The epidural space is between your spinal cord and vertebrae  Steroids reduce inflammation and fluid buildup in your spine that may be causing pain  You may be given pain medicine along with the steroids  ACTIVITY  · Do not drive or operate machinery today  · No strenuous activity today - bending, lifting, etc   · You may resume normal activites starting tomorrow - start slowly and as tolerated  · You may shower today, but no tub baths or hot tubs  · You may have numbness for several hours from the local anesthetic  Please use caution and common sense, especially with weight-bearing activities  CARE OF THE INJECTION SITE  · If you have soreness or pain, apply ice to the area today (20 minutes on/20 minutes off)  · Starting tomorrow, you may use warm, moist heat or ice if needed  · You may have an increase or change in your discomfort for 36-48 hours after your treatment  · Apply ice and continue with any pain medication you have been prescribed  · Notify the [facility] if you have any of the following: redness, drainage, swelling, headache, stiff neck or fever above 100°F     SPECIAL INSTRUCTIONS  · Our office will contact you in approximately 7 days for a progress report  MEDICATIONS  · Continue to take all routine medications  · Our office may have instructed you to hold some medications  If you have a problem specifically related to your procedure, please call our office at [phone]  Problems not related to your procedure should be directed to your primary care physician

## 2020-07-17 ENCOUNTER — TELEPHONE (OUTPATIENT)
Dept: NEUROSURGERY | Facility: CLINIC | Age: 85
End: 2020-07-17

## 2020-07-17 NOTE — TELEPHONE ENCOUNTER
Patient daughter calling for kub results  Advised at the moment results are not in but someone can call her as soon as results are in

## 2020-07-17 NOTE — TELEPHONE ENCOUNTER
Call placed to radiology reading room, spoke with Niharika Service  He advised he will have radiologist read KUB

## 2020-07-17 NOTE — TELEPHONE ENCOUNTER
Patient's daughter called in regard to results  Patient is in a lot of pain and is struggling  Daughter reported she has all the signs of kidney stones  She is requesting to be called by end of day please

## 2020-07-20 ENCOUNTER — CONSULT (OUTPATIENT)
Dept: NEUROSURGERY | Facility: CLINIC | Age: 85
End: 2020-07-20
Payer: MEDICARE

## 2020-07-20 ENCOUNTER — TELEPHONE (OUTPATIENT)
Dept: PAIN MEDICINE | Facility: MEDICAL CENTER | Age: 85
End: 2020-07-20

## 2020-07-20 VITALS
HEART RATE: 75 BPM | SYSTOLIC BLOOD PRESSURE: 136 MMHG | TEMPERATURE: 98.9 F | RESPIRATION RATE: 16 BRPM | HEIGHT: 63 IN | WEIGHT: 113 LBS | BODY MASS INDEX: 20.02 KG/M2 | DIASTOLIC BLOOD PRESSURE: 78 MMHG

## 2020-07-20 DIAGNOSIS — M71.38 SYNOVIAL CYST OF LUMBAR FACET JOINT: ICD-10-CM

## 2020-07-20 DIAGNOSIS — S22.000A COMPRESSION FRACTURE OF BODY OF THORACIC VERTEBRA (HCC): Primary | ICD-10-CM

## 2020-07-20 DIAGNOSIS — G62.9 PERIPHERAL POLYNEUROPATHY: ICD-10-CM

## 2020-07-20 PROCEDURE — 99205 OFFICE O/P NEW HI 60 MIN: CPT | Performed by: RADIOLOGY

## 2020-07-20 RX ORDER — GABAPENTIN 100 MG/1
CAPSULE ORAL
Qty: 270 CAPSULE | Refills: 0
Start: 2020-07-20 | End: 2020-10-16 | Stop reason: SDUPTHER

## 2020-07-20 RX ORDER — TRAMADOL HYDROCHLORIDE 50 MG/1
50 TABLET ORAL AS NEEDED
COMMUNITY
End: 2020-07-23 | Stop reason: ALTCHOICE

## 2020-07-20 NOTE — TELEPHONE ENCOUNTER
S/w Anai Cuadra as per [facility]  She is S/P a Right L5 TFESI on 7/16  Daughter stated that her mother seems to be in more pain post the injection  She continues on Eliquis, so she normally takes tylenol for the pain but it's not helping anymore  She is also on the gabapentin 200mg at HS and she has been on that for some time  Do you have any other suggestions? I did review the DC instructions again  So that she understands that it could take some time

## 2020-07-20 NOTE — TELEPHONE ENCOUNTER
No apparent ureteral calculi were noted, but she did have large presence of stool which can partially obscure image  If patient is still having episodes of flank pain, would recommend proceeding with CT stone study  Order in [place]  Please review ER precautions

## 2020-07-20 NOTE — TELEPHONE ENCOUNTER
[facility] called stating pt pain is increasing and would like to know if there is anything that can be given for her pain   Pt can be reached at [phone]

## 2020-07-20 NOTE — TELEPHONE ENCOUNTER
I would have her increase the gabapentin to 300 mg HS and give the injection more time  She should try the increase for the next week and then f/u if her pain does not improve

## 2020-07-20 NOTE — TELEPHONE ENCOUNTER
LM for Darlyne Bosworth that there was no stone seen however she did have a large stool present that could obscure results    It was recommended that she try to have a bowel movement and if she continues to have back pain a ct scan is in system

## 2020-07-20 NOTE — TELEPHONE ENCOUNTER
S/w pt's daughter, Nelly Harris  Advised of above  Explained gabapentin will take time to provide relief  Also, the pt is in the 3 - 5 day post injection window discussed in her dc instructions - there may be changes in the pt's pain, it may get worse before it gets better  Nelly Harris questioned lyrica vs gabapentin  Melissa Torres, these medications are different  1 may work well where as one may fail  Torrie owen, if a change in medication becomes necessary, please discuss with this office, do not make changes indenpendently  Nelly Harris verbalized understanding and appreciation  Will await procedure fu call on thursday

## 2020-07-20 NOTE — TELEPHONE ENCOUNTER
Called Rashawn back  She states that Didier Hall is pooping per her mom, Again reiterated what Tabatha Diallo had recommended  Our call got dropped and tried to call her back

## 2020-07-20 NOTE — PROGRESS NOTES
Assessment/Plan:     Diagnoses and all orders for this visit:    Compression fracture of body of thoracic vertebra (HCC)  -     Tlso Back Brace  -     X-ray thoracolumbar spine 2 views; Future  -     X-ray thoracolumbar spine 2 views; Future    Synovial cyst of lumbar facet joint  -     Ambulatory referral to Neurosurgery    Other orders  -     traMADol (ULTRAM) 50 mg tablet; Take 50 mg by mouth as needed for moderate pain        Discussion Summary:   Anthony Samuels has a right L4 vs L5 radiculopathy  This is likely due to severe DJD at L4-5 with severe bilateral foraminal narrowing  In my review of her MRI, there is no synovial cyst but rather nerve root sleeve cysts, which are normal variants and not responsible for her symptoms  She does however have a T11 compression fracture  It does not seem to be causing her any direct pain and I am unclear if it contributing at all to her radiculopathy  I have recommended a brace over kyphoplasty  She will be fitted for a TLSO and obtain XR today  She will then return in 4 weeks with a new set of XRs  For her current pain, I recommended rest and perhaps medication changes and additional injections  The brace may in fact help with her pain as there is spondylolisthesis contributing to her pain  Major surgery with fusion is not an option for her  Chief Complaint: Consult (Lumbar spine)      Patient ID: Gemini Carrillo is a 80 y o  female    HPI  [facility]  [facility] presents for evaluation of her back pain  She is here with her daughter  She reports right lower back pain with radiating pain to the right leg x 1-2 months  Severe  Needs a walker now  States mild weakness due to pain  No numbness or tingling/burning  Also fell in march  Denies upper back pain  Pain is flexion and extension of her back as well as with standing  She has undergone a right L5-S1 TFESI without relief  She is on gabapentin for a prior case of shingles which she states allows for some current relief of pain   No bowel or bladder problems  Review of Systems   Constitutional: Negative  HENT: Negative  Eyes: Negative  Respiratory: Negative  Cardiovascular: Negative  Gastrointestinal: Negative  Endocrine: Negative  Genitourinary: Negative  Musculoskeletal: Positive for back pain (lower back pain down into right leg), gait problem (from her back pain) and neck pain (pintch nerve)  Negative for myalgias  Allergic/Immunologic: Negative  Neurological: Positive for weakness (bilateral legs)  Negative for dizziness, tremors, seizures, syncope, light-headedness, numbness and headaches  Hematological: Bruises/bleeds easily (medication)  Psychiatric/Behavioral: Negative  I have personally reviewed the MA's review of systems and made changes as necessary      The following portions of the patient's history were reviewed and updated as appropriate: allergies, current medications, past family history, past medical history, past social history, past surgical history and problem list       Active Ambulatory Problems     Diagnosis Date Noted    Hx of CABG 01/18/2017    Essential hypertension 12/16/2014    Hyperlipidemia 07/24/2013    Paroxysmal atrial fibrillation ([facility] [address] ) 11/30/2017    History of aortic valve replacement 02/01/2018    Acute cystitis with hematuria 02/27/2018    Arteriosclerosis of carotid artery 07/24/2013    Cervical spine arthritis 04/13/2015    Chronic cervical radiculopathy 11/10/2015    Dystrophy of vulva 10/13/2015    Mixed stress and urge urinary incontinence 06/05/2015    Neural foraminal stenosis of cervical spine 05/08/2015    Occipital neuralgia of right side 03/01/2016    Osteoarthritis 03/27/2015    Peripheral polyneuropathy 07/24/2013    Bradycardia 05/18/2018    Gastroesophageal reflux disease with esophagitis 06/26/2018    Post herpetic neuralgia 02/19/2019    Lower extremity edema 04/02/2019    Pes anserinus bursitis of left knee 09/04/2019    CKD (chronic kidney disease) stage 3, GFR 30-59 ml/min ([facility]) 09/16/2019    Sick sinus syndrome ([facility] [address] ) 12/02/2019    Abnormal CT of the abdomen 03/20/2020    Hepatic lesion 03/20/2020    Chronic diastolic congestive heart failure ([facility] [address] ) 03/20/2020    Anemia 03/21/2020    Hypokalemia 03/30/2020    Adenocarcinoma determined by biopsy of liver [facility] ) 03/30/2020    Coronary artery disease involving native coronary artery of native heart without angina pectoris 04/09/2020    Lumbar foraminal stenosis     Lumbar radiculopathy      Resolved Ambulatory Problems     Diagnosis Date Noted    Aortic valve disorder 12/16/2014    Coronary artery disease 07/15/2015    Cholelithiasis without cholecystitis 12/16/2014    Cystitis, chronic 04/20/2015    Inflammatory spondylopathy of cervical region ([facility] ) 02/19/2019    Acute pain of left knee 09/10/2019    Acute medial meniscus tear of left knee 09/12/2019    Aftercare following surgery of the musculoskeletal system 10/04/2019    Incomplete bladder emptying 10/08/2019    Localized edema 10/29/2019     Past Medical History:   Diagnosis Date    Acute myocardial infarction [facility])     Aortic valve disease     Arthritis     Atrial fibrillation (HCC)     CAD (coronary artery disease)     Cancer (HCC)     Cellulitis     Cervical stenosis of spine     CHF (congestive heart failure) (HCC)     Cholecystitis     Closed Colles' fracture     Closed fracture of radius     Diverticulosis     Dyspnea     Hair loss disorder     Hematuria     History of being hospitalized     Hypercholesterolemia     Hypertension     Leg wound, left     Liver cancer ([facility] [facility] )     Malaise and fatigue     Renal disorder     Urinary tract infection        Past Surgical History:   Procedure Laterality Date    AORTIC VALVE REPLACEMENT  01/2012    HEART VALVE    CARDIAC SURGERY      CATARACT EXTRACTION W/  INTRAOCULAR LENS IMPLANT  08/26/2010    Glynn Gosselin, MD; PHACOEMULISIFICATION; INSERTION INTRAOCULAR LENS OD    COLONOSCOPY  03/08/2007    CORONARY ARTERY BYPASS GRAFT  01/2005    IR IMAGE GUIDED BIOPSY/ASPIRATION LIVER  3/25/2020    LEG SURGERY      MI KNEE SCOPE,MED/LAT MENISECTOMY Left 9/23/2019    Procedure: ARTHROSCOPY KNEE, LEFT PARTIAL MEDIAL MENISCECTOMY;  Surgeon: Gianni Patton MD;  Location:  MAIN OR;  Service: Orthopedics         Vitals:    07/20/20 1150   BP: 136/78   Pulse: 75   Resp: 16   Temp: 98 9 °F (37 2 °C)         Objective:    Physical Exam   Constitutional: She is oriented to person, place, and time  She appears well-developed and well-nourished  HENT:   Head: Normocephalic and atraumatic  Eyes: Pupils are equal, round, and reactive to light  EOM are normal    Cardiovascular: Normal rate and intact distal pulses  Pulmonary/Chest: Effort normal    Abdominal: Soft  Musculoskeletal: Normal range of motion  She exhibits no edema, tenderness or deformity  Neurological: She is alert and oriented to person, place, and time  She has normal strength  No cranial nerve deficit or sensory deficit  She exhibits normal muscle tone  Coordination normal    Skin: Skin is warm and dry  Redness, chronic venous stasis  Frail/fragile skin  Psychiatric: She has a normal mood and affect  Her behavior is normal  Judgment and thought content normal      Neurologic Exam     Mental Status   Oriented to person, place, and time  Cranial Nerves     CN III, IV, VI   Pupils are equal, round, and reactive to light  Extraocular motions are normal      Motor Exam     Strength   Strength 5/5 throughout  Results/Data:  I have reviewed the results and images from the MRI in detail with the patient

## 2020-07-20 NOTE — TELEPHONE ENCOUNTER
Called Samira Vidal back and again went over if she is having a bowel movement - she states she is taking miralax and eating prunes  She told Samira Vidal she is having bowel movements    She is going to have an xray tomorrow for her back since she had a fall and had back pain  Told her that maybe she can ask if it would show an impacted bowel and if she wants to have Ct scan at the same time   She agreed to plan

## 2020-07-20 NOTE — TELEPHONE ENCOUNTER
IMPRESSION:     Numerous right upper quadrant calculi in keeping with cholelithiasis      Workstation performed: [id number]   Please advise on KUB as patient is having discomfort

## 2020-07-21 ENCOUNTER — HOSPITAL ENCOUNTER (OUTPATIENT)
Dept: RADIOLOGY | Facility: HOSPITAL | Age: 85
Discharge: HOME | End: 2020-07-21
Payer: MEDICARE

## 2020-07-21 DIAGNOSIS — S22.000A COMPRESSION FRACTURE OF BODY OF THORACIC VERTEBRA (HCC): ICD-10-CM

## 2020-07-21 PROCEDURE — 72080 X-RAY EXAM THORACOLMB 2/> VW: CPT

## 2020-07-22 DIAGNOSIS — R60.0 LOWER EXTREMITY EDEMA: ICD-10-CM

## 2020-07-22 DIAGNOSIS — E87.6 HYPOKALEMIA: ICD-10-CM

## 2020-07-22 RX ORDER — POTASSIUM CHLORIDE 20 MEQ/1
20 TABLET, EXTENDED RELEASE ORAL 2 TIMES DAILY
Qty: 60 TABLET | Refills: 11 | Status: SHIPPED | OUTPATIENT
Start: 2020-07-22

## 2020-07-22 RX ORDER — TORSEMIDE 20 MG/1
20 TABLET ORAL DAILY
Qty: 30 TABLET | Refills: 11 | Status: SHIPPED | OUTPATIENT
Start: 2020-07-22

## 2020-07-23 ENCOUNTER — TELEPHONE (OUTPATIENT)
Dept: CARDIOLOGY CLINIC | Facility: CLINIC | Age: 85
End: 2020-07-23

## 2020-07-23 ENCOUNTER — TELEPHONE (OUTPATIENT)
Dept: PAIN MEDICINE | Facility: CLINIC | Age: 85
End: 2020-07-23

## 2020-07-23 DIAGNOSIS — M54.16 LUMBAR RADICULOPATHY: Primary | ICD-10-CM

## 2020-07-23 DIAGNOSIS — M54.16 LUMBAR RADICULITIS: ICD-10-CM

## 2020-07-23 DIAGNOSIS — M71.38 SYNOVIAL CYST OF LUMBAR FACET JOINT: ICD-10-CM

## 2020-07-23 DIAGNOSIS — M48.061 SPINAL STENOSIS OF LUMBAR REGION, UNSPECIFIED WHETHER NEUROGENIC CLAUDICATION PRESENT: Primary | ICD-10-CM

## 2020-07-23 RX ORDER — HYDROCODONE BITARTRATE AND ACETAMINOPHEN 5; 325 MG/1; MG/1
TABLET ORAL
Qty: 45 TABLET | Refills: 0 | Status: SHIPPED | OUTPATIENT
Start: 2020-07-23 | End: 2020-10-16 | Stop reason: ALTCHOICE

## 2020-07-23 NOTE — TELEPHONE ENCOUNTER
Patient did not get any pain relief  She is feeling really bad and her daughter would like to speak with someone about this   Please follow with Trey Mcgrath

## 2020-07-23 NOTE — TELEPHONE ENCOUNTER
S/w pt's daughter, Yariel De La Vega, per medical communication consent  Advised of above  Yariel De La Vega verbalized agreement w/ vicodin stating that the pt is in extreme pain - difficulty getting in / out of bed and getting on / off the toilet  Haja Lin per SL, anticipate rx for vicodin 1/2 - 1 tab po tid prn pain will be sent to [facility] in [facility]  This medication may cause significant drowsiness / confusion  Please monitor the pt  Provided Dr Kenisha Espinoza contact info for invasive surgical eval      Scheduled fu ov w/ DG on 8/14 for eval and refill  Yariel De La Vega questioned when the pt can schedule a repeat procedure  Haja Lin, 2 weeks after the first  Per lexie, pt would like to schedule a repeat procedure to avoid continued pain medication  Haja Lin, will d/w Dr Aletha Arciniega and cb to advise

## 2020-07-23 NOTE — TELEPHONE ENCOUNTER
Pt's daughter called re: back pain  Stating Tylenol isn't working, asking if pt can take Ibuprofen  It appears pt had an injection with Pain and this question is being addressed by them already

## 2020-07-23 NOTE — TELEPHONE ENCOUNTER
S/w Mayito Vanegas, advised of above  Scheduled LESI 8/17/2020 at 3:00 pm  No eliquis 8/14/2020 - 8/17/2020 post procedure  Reviewed pre procedure instructions; eat a light meal - npo 1 hour prior,  - may not enter the building, mask must be worn while on the property, loose fitting clothing, cb if illness / abs present, injected steroid may affect immunity  Pt should practice strict handwashing, masking, social distancing / isolate whenever possible x 2 - 3 weeks after procedure  Pt verbalized understanding  Will cb if questions / concerns arise

## 2020-07-23 NOTE — TELEPHONE ENCOUNTER
I can prescribe Vicodin, if she still having and she needs surgical evaluation with Dr Feng Eckert and f/u with [facility]

## 2020-07-23 NOTE — TELEPHONE ENCOUNTER
Pt daughter said the injection has not helped at all   She said her mom can barely walk that shes getting really bad  She said the Gabapentin isnt working  She wants to know if there is something stronger she can be prescribed   She is aware the injection takes a couple weeks for full effect

## 2020-07-24 ENCOUNTER — TELEPHONE (OUTPATIENT)
Dept: NEUROSURGERY | Facility: CLINIC | Age: 85
End: 2020-07-24

## 2020-07-24 NOTE — TELEPHONE ENCOUNTER
It is recommended for patient not to be on regular ibuprofen while on anticoagulation  However and intermittent dose every once in a great while would be okay  Otherwise it would be Tylenol her what was prescribed her by pain management    Thank you

## 2020-07-24 NOTE — TELEPHONE ENCOUNTER
Patient's daughter called Cardiology office today Re: Can mother take Ibuprofen while on Eliquis  Was given Percocet by Pain Management for back pain after injection but states is not helping

## 2020-07-24 NOTE — TELEPHONE ENCOUNTER
Received a call from [place] reporting that Dr Ally Parham has directed them back to our office to schedule with Dr Margy Jurado to discuss surgical options  Patient is of advanced age and has quite a few co-morbidities, but her daughter hopes to figure out something to relieve her pain  Discussed with front office if appt with Dr Margy Jurado would be reasonable  Explained to her daughter that she would need clearances from her various specialists before surgery could be carried out  She stated an understanding  Assisted to schedule an appt with Dr Margy Jurado

## 2020-07-31 ENCOUNTER — LAB (OUTPATIENT)
Dept: LAB | Facility: HOSPITAL | Age: 85
End: 2020-07-31
Payer: MEDICARE

## 2020-07-31 ENCOUNTER — HOSPITAL ENCOUNTER (OUTPATIENT)
Dept: MRI IMAGING | Facility: HOSPITAL | Age: 85
Discharge: HOME | End: 2020-07-31
Payer: MEDICARE

## 2020-07-31 DIAGNOSIS — C22.9 ADENOCARCINOMA DETERMINED BY BIOPSY OF LIVER (HCC): ICD-10-CM

## 2020-07-31 LAB
BUN SERPL-MCNC: 20 MG/DL (ref 5–25)
CREAT SERPL-MCNC: 1 MG/DL (ref 0.6–1.3)
GFR SERPL CREATININE-BSD FRML MDRD: 49 ML/MIN/1.73SQ M

## 2020-07-31 PROCEDURE — 74183 MRI ABD W/O CNTR FLWD CNTR: CPT

## 2020-07-31 PROCEDURE — A9585 GADOBUTROL INJECTION: HCPCS | Performed by: RADIOLOGY

## 2020-07-31 PROCEDURE — 36415 COLL VENOUS BLD VENIPUNCTURE: CPT

## 2020-07-31 PROCEDURE — 82565 ASSAY OF CREATININE: CPT

## 2020-07-31 PROCEDURE — 84520 ASSAY OF UREA NITROGEN: CPT

## 2020-07-31 RX ADMIN — GADOBUTROL 5 ML: 604.72 INJECTION INTRAVENOUS at 12:00

## 2020-08-10 ENCOUNTER — TELEMEDICINE (OUTPATIENT)
Dept: RADIATION ONCOLOGY | Facility: HOSPITAL | Age: 85
End: 2020-08-10
Attending: RADIOLOGY

## 2020-08-10 DIAGNOSIS — C22.9 ADENOCARCINOMA DETERMINED BY BIOPSY OF LIVER (HCC): Primary | ICD-10-CM

## 2020-08-10 NOTE — PROGRESS NOTES
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  She is 80years old and was not a candidate for systemic therapy or surgical resection  The stereotactic radiation, 3500 cGy in 5 fractions, was delivered to the gross disease in the left lobe with margin  It was understood that this was unlikely to be curative but the goal was to try and provide some degree of local control in order to prolong her survival   The patient tolerated treatment well and returns today via telephone having undergone her 1st post treatment surveillance MRI of the liver  In regards to the previously treated left lobe lesion, this did appear slightly larger on the new imaging, 3 7 x 3 2 compared to 3 2 x 2 5  There was also a new 9 mm lesion identified in the right lobe of the liver concerning for metastatic disease  There continues to be no obvious extra-hepatic disease at this time  We have suggested presenting the patient's case at the multidisciplinary upper GI working group  It is unclear if she would be a candidate for liver directed therapy or systemic therapy of any sorts  We would not recommend Y-90 to the left lobe given recent SBRT, and the right sided disease measures only 0 9 cm  We will see the patient back in follow-up in 2 weeks following discussion at upper GI working group  Problem List Items Addressed This Visit        Digestive    Adenocarcinoma determined by biopsy of liver ([facility] [address] ) - Primary               Reason for visit is   Chief Complaint   Patient presents with    Follow-up        Encounter provider Tre Jaffe MD    Provider located at [address] [id number]      Recent Visits  No visits were found meeting these conditions  Showing recent visits within past 7 days and meeting all other requirements     Today's Visits  Date Type Provider Dept   08/10/20 Telemedicine Gely Knowles MD [facility]   Showing today's visits and meeting all other requirements     Future Appointments  No visits were found meeting these conditions  Showing future appointments within next 150 days and meeting all other requirements        The patient was identified by name and date of birth  Justino Lewis was informed that this is a telemedicine visit and that the visit is being conducted through telephone  My office door was closed  No one else was in the room  She acknowledged consent and understanding of privacy and security of the video platform  The patient has agreed to participate and understands they can discontinue the visit at any time  Patient is aware this is a billable service  Subjective  Justino Lewis returns today for routine scheduled follow-up visit approximately at least 3 months status post completion of stereotactic radiation to a 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  The patient of late has been struggling with significant low back pain radiating down the legs  She has been following closely with pain medicine as well as neuro surgery, and it seems that her pain is being attributed to significant degenerative changes in the lumbosacral spine  She currently denies any abdominal pain per se, nausea, vomiting, or diarrhea  No jaundice  HPI   Patient returns for first follow-up s/p Liver SBRT completed in 5 fractions on 5/8/20      80year old female with 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  She had no evidence of distant metastatic disease, but was not a good surgical candidate secondary to her age  She was also not a candidate for systemic therapy  She has now completed a course of stereotactic liver radiation to the gross disease with margin   She tolerated treatment well without any obvious acute toxicity  7/2/20 Pain Medicine, Dr Anel Montgomery  Worsening low back and right leg pain for the past 6 weeks  9/10 on a scale, interfering with ADLs  Plan for MRI Lspine to evaluate and start her on a titrating dose of methylprednisolone  7/8/20 MRI lumbar spine   IMPRESSION:  Moderate T11 compression deformity that could be subacute  Minimal bony retropulsion without significant canal stenosis  Multilevel degenerative changes including degenerative grade 1 listhesis at L4-5 with small superimposed right foraminal protrusion and severe bilateral neuroforaminal stenosis  Facet arthropathy with synovial cyst projecting anteriorly from the right L5-S1 facet complex that abuts the exiting right L5 nerve root  7/16/20 Procedure:  Right L5 Transforaminal Epidural Injection under Fluoroscopy  7/20/20 Dr Jamie Oliveira, Neurosurgery consult for T11 compression fracture  Recommend TLSO brace over kyphoplasty  Rest and medications or additional injections recommended  Taking gabapentin with some pain relief      7/23/20 Dr Melo Multani Pain management prescribed Norco for pain    7/31/20 MRI abdomen w wo contrast and mrcp (Dr Maria A Morrow)  In this patient with a hepatic cholangiocarcinoma status post radiation therapy, compared to 3/20/2020 MRI, the primary tumor located predominantly in segment 4 has increased in size currently measuring 3 7 x 3 2 cm, previously 3 2 x 2 5 cm (compare series 10 image 71 with series 11 image 39 of the prior study ) There is unchanged intrahepatic biliary ductal dilatation in segment 2/3 due to this mass  There is also a new 0 9 x 0 9 cm right hepatic lobe segment 7 metastasis (series 10 image 56 )   Primary issue is low back pain 9/10  Has been taking Norco and Gabapentin which is minimally effective with reducing pain  Spoke on the telemed visit with her dtr Tita Uriarte, who had me on speaker phone  Pt was able to hear and  converse and answer questions        Past Medical History: Diagnosis Date    Acute myocardial infarction [facility])     Aortic valve disease     Arthritis     Atrial fibrillation (HCC)     CAD (coronary artery disease)     Cancer (HCC)     skin cancer removed from left leg    Cellulitis      AND ABSCESS    Cervical stenosis of spine     CHF (congestive heart failure) (HCC)     Cholecystitis     Closed Colles' fracture     OF THE LEFT WRIST; LAST ASSESSED: 5/2/14    Closed fracture of radius     LAST ASSESSED: 2/19/14    Diverticulosis     Dyspnea     Hair loss disorder     Hematuria     History of being hospitalized     1/5/12-1/14/12 Juan Diego MACIEL PROCEDURES: 1) REDO STERNOTOMY AND AORTIC VALVE REPLACEMENT WITH A 19-MM REBA-PETERS BOVINIE PERICARDIAL MAGNA EASE VALVE 2) TRANSESOPHAGEAL ECHOCARDIOGRAM     Hypercholesterolemia     Hypertension     Incomplete bladder emptying     Leg wound, left     Liver cancer (HCC)     Malaise and fatigue     Renal disorder     Sick sinus syndrome (HCC)     Urinary tract infection        Past Surgical History:   Procedure Laterality Date    AORTIC VALVE REPLACEMENT  01/2012    HEART VALVE    CARDIAC SURGERY      CATARACT EXTRACTION W/  INTRAOCULAR LENS IMPLANT  08/26/2010    Amber Hodge MD; PHACOEMULISIFICATION; INSERTION INTRAOCULAR LENS OD    COLONOSCOPY  03/08/2007    CORONARY ARTERY BYPASS GRAFT  01/2005    IR IMAGE GUIDED BIOPSY/ASPIRATION LIVER  3/25/2020    LEG SURGERY      UT KNEE SCOPE,MED/LAT MENISECTOMY Left 9/23/2019    Procedure: ARTHROSCOPY KNEE, LEFT PARTIAL MEDIAL MENISCECTOMY;  Surgeon: Jake Green MD;  Location:  MAIN OR;  Service: Orthopedics       Current Outpatient Medications   Medication Sig Dispense Refill    amLODIPine (NORVASC) 2 5 mg tablet Take 1 tablet (2 5 mg total) by mouth daily 90 tablet 3    apixaban (Eliquis) 5 mg Take 1 tablet (5 mg total) by mouth 2 (two) times a day 180 tablet 3    atorvastatin (LIPITOR) 20 mg tablet Take 1 tablet (20 mg total) by mouth every evening 90 tablet 3    clotrimazole-betamethasone (LOTRISONE) 1-0 05 % cream Apply topically 2 (two) times a day 30 g 3    Coenzyme Q10 (COQ10) 100 MG CAPS Take 1 capsule by mouth Daily      conjugated estrogens (PREMARIN) vaginal cream Apply a pea size amount of the estrogen cream to the opening of the vagina three nights per week   gabapentin (NEURONTIN) 100 mg capsule Take 3 PO HS  270 capsule 0    HYDROcodone-acetaminophen (NORCO) 5-325 mg per tablet 1/2 to 1 tab p o  T i d  P r n  Pain 45 tablet 0    Multiple Vitamins-Minerals (OCUVITE EXTRA) TABS Take 1 tablet by mouth daily      Omega-3 1000 MG CAPS Take by mouth      polyethylene glycol (GLYCOLAX) powder Take 17 g by mouth daily      potassium chloride (K-DUR,KLOR-CON) 20 mEq tablet Take 1 tablet (20 mEq total) by mouth 2 (two) times a day 60 tablet 11    senna-docusate sodium (SENOKOT S) 8 6-50 mg per tablet Take 1 tablet by mouth daily 9 tablet 0    torsemide (DEMADEX) 20 mg tablet Take 1 tablet (20 mg total) by mouth daily (Patient taking differently: Take 20 mg by mouth daily as needed ) 30 tablet 11     No current facility-administered medications for this visit  Allergies   Allergen Reactions    Heparin      Annotation - [id number]: LOW PLATELETS    Phenazopyridine      As per daughter "Not appropriate for pt due to heart condition"       Review of Systems  Review of Systems   Constitutional: Positive for activity change (uses a walker top get around with the back pain ) and fatigue (sleeps when she lays down wiht the back pain)  HENT: Negative  Eyes: Positive for visual disturbance  Respiratory: Negative  Cardiovascular: Positive for leg swelling (a little)  Gastrointestinal: Negative  Endocrine: Negative  Genitourinary:        Had had difficulty with voiding for awhile   Musculoskeletal: Positive for back pain (8-9/10 low back pain)  Skin: Negative  Allergic/Immunologic: Negative      Neurological: Negative  Hematological: Bruises/bleeds easily  Psychiatric/Behavioral: Positive for sleep disturbance  Video Exam    There were no vitals filed for this visit  Physical Exam   It was my intent to perform this visit via video technology but the patient was not able to do a video connection so the visit was completed via audio telephone only  VIRTUAL VISIT DISCLAIMER    Brian Anton acknowledges that she has consented to an online visit or consultation  She understands that the online visit is based solely on information provided by her, and that, in the absence of a face-to-face physical evaluation by the physician, the diagnosis she receives is both limited and provisional in terms of accuracy and completeness  This is not intended to replace a full medical face-to-face evaluation by the physician  Brian Walton understands and accepts these terms

## 2020-08-10 NOTE — TELEPHONE ENCOUNTER
Pt's daughter Davina Biggs called in to cancel the procedure no reschedule   Please be advise thank you      Patient's daughter Davina Biggs  call back #[phone]

## 2020-08-10 NOTE — PROGRESS NOTES
Virtual Brief Visit    Problem List Items Addressed This Visit     None             Encounter provider Eliza Hernandez MD    Provider located at [address] [address]      Recent Visits  No visits were found meeting these conditions  Showing recent visits within past 7 days and meeting all other requirements     Today's Visits  Date Type Provider Dept   08/10/20 Telemedicine Eliza Hernandez MD [facility]   Showing today's visits and meeting all other requirements     Future Appointments  No visits were found meeting these conditions  Showing future appointments within next 150 days and meeting all other requirements        After connecting through phone and patient was informed that this is not a secure, HIPAA-complaint platform  She agrees to proceed  , the patient was identified by name and date of birth  Rachel Iglesias and her daughter Sydni Mendez were  informed that this is a telemedicine visit and that the visit is being conducted through phone and patient was informed that this is not a secure, HIPAA-complaint platform  She agrees to proceed     My office door was closed  No one else was in the room  She acknowledged consent and understanding of privacy and security of the video platform  The patient has agreed to participate and understands they can discontinue the visit at any time  Patient is aware this is a billable service  Subjective  Patient returns for first follow-up s/p Liver SBRT completed in 5 fractions on 5/8/20      80year old female with 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  She had no evidence of distant metastatic disease, but was not a good surgical candidate secondary to her age  She was also not a candidate for systemic therapy  She has now completed a course of stereotactic liver radiation to the gross disease with margin   She tolerated treatment well without any obvious acute toxicity  7/2/20 Pain Medicine, Dr Min Products  Worsening low back and right leg pain for the past 6 weeks  9/10 on a scale, interfering with ADLs  Plan for MRI Lspine to evaluate and start her on a titrating dose of methylprednisolone  7/8/20 MRI lumbar spine   IMPRESSION:  Moderate T11 compression deformity that could be subacute  Minimal bony retropulsion without significant canal stenosis  Multilevel degenerative changes including degenerative grade 1 listhesis at L4-5 with small superimposed right foraminal protrusion and severe bilateral neuroforaminal stenosis  Facet arthropathy with synovial cyst projecting anteriorly from the right L5-S1 facet complex that abuts the exiting right L5 nerve root  7/16/20 Procedure:  Right L5 Transforaminal Epidural Injection under Fluoroscopy  7/20/20 Dr Ciaran Viera, Neurosurgery consult for T11 compression fracture  Recommend TLSO brace over kyphoplasty  Rest and medications or additional injections recommended  Taking gabapentin with some pain relief      7/23/20 Dr Lani Florez Pain management prescribed Norco for pain    7/31/20 MRI abdomen w wo contrast and mrcp (Dr Darvin Brandt)  In this patient with a hepatic cholangiocarcinoma status post radiation therapy, compared to 3/20/2020 MRI, the primary tumor located predominantly in segment 4 has increased in size currently measuring 3 7 x 3 2 cm, previously 3 2 x 2 5 cm (compare series 10 image 71 with series 11 image 39 of the prior study ) There is unchanged intrahepatic biliary ductal dilatation in segment 2/3 due to this mass  There is also a new 0 9 x 0 9 cm right hepatic lobe segment 7 metastasis (series 10 image 56 )   Primary issue is low back pain 9/10  Has been taking Norco and Gabapentin which is minimally effective with reducing pain  Spoke on the telemed visit with her dtr Jaimee Brizuela, who had me on speaker phone  Pt was able to hear and  converse and answer questions          Past Medical History:   Diagnosis Date    Acute myocardial infarction [facility])     Aortic valve disease     Arthritis     Atrial fibrillation (HCC)     CAD (coronary artery disease)     Cancer (HCC)     skin cancer removed from left leg    Cellulitis      AND ABSCESS    Cervical stenosis of spine     CHF (congestive heart failure) (HCC)     Cholecystitis     Closed Colles' fracture     OF THE LEFT WRIST; LAST ASSESSED: 5/2/14    Closed fracture of radius     LAST ASSESSED: 2/19/14    Diverticulosis     Dyspnea     Hair loss disorder     Hematuria     History of being hospitalized     1/5/12-1/14/12 [facility] PROCEDURES: 1) REDO STERNOTOMY AND AORTIC VALVE REPLACEMENT WITH A 19-MM REBA-PETERS BOVINIE PERICARDIAL MAGNA EASE VALVE 2) TRANSESOPHAGEAL ECHOCARDIOGRAM     Hypercholesterolemia     Hypertension     Incomplete bladder emptying     Leg wound, left     Liver cancer (HCC)     Malaise and fatigue     Renal disorder     Sick sinus syndrome (HCC)     Urinary tract infection        Past Surgical History:   Procedure Laterality Date    AORTIC VALVE REPLACEMENT  01/2012    HEART VALVE    CARDIAC SURGERY      CATARACT EXTRACTION W/  INTRAOCULAR LENS IMPLANT  08/26/2010    Miguel Herrera MD; PHACOEMULISIFICATION; INSERTION INTRAOCULAR LENS OD    COLONOSCOPY  03/08/2007    CORONARY ARTERY BYPASS GRAFT  01/2005    IR IMAGE GUIDED BIOPSY/ASPIRATION LIVER  3/25/2020    LEG SURGERY      NM KNEE SCOPE,MED/LAT MENISECTOMY Left 9/23/2019    Procedure: ARTHROSCOPY KNEE, LEFT PARTIAL MEDIAL MENISCECTOMY;  Surgeon: Christy Munoz MD;  Location: [facility] OR;  Service: Orthopedics       Current Outpatient Medications   Medication Sig Dispense Refill    amLODIPine (NORVASC) 2 5 mg tablet Take 1 tablet (2 5 mg total) by mouth daily 90 tablet 3    apixaban (Eliquis) 5 mg Take 1 tablet (5 mg total) by mouth 2 (two) times a day 180 tablet 3    atorvastatin (LIPITOR) 20 mg tablet Take 1 tablet (20 mg total) by mouth every evening 90 tablet 3    clotrimazole-betamethasone (LOTRISONE) 1-0 05 % cream Apply topically 2 (two) times a day 30 g 3    Coenzyme Q10 (COQ10) 100 MG CAPS Take 1 capsule by mouth Daily      conjugated estrogens (PREMARIN) vaginal cream Apply a pea size amount of the estrogen cream to the opening of the vagina three nights per week   gabapentin (NEURONTIN) 100 mg capsule Take 3 PO HS  270 capsule 0    HYDROcodone-acetaminophen (NORCO) 5-325 mg per tablet 1/2 to 1 tab p o  T i d  P r n  Pain 45 tablet 0    Multiple Vitamins-Minerals (OCUVITE EXTRA) TABS Take 1 tablet by mouth daily      Omega-3 1000 MG CAPS Take by mouth      polyethylene glycol (GLYCOLAX) powder Take 17 g by mouth daily      potassium chloride (K-DUR,KLOR-CON) 20 mEq tablet Take 1 tablet (20 mEq total) by mouth 2 (two) times a day 60 tablet 11    senna-docusate sodium (SENOKOT S) 8 6-50 mg per tablet Take 1 tablet by mouth daily 9 tablet 0    torsemide (DEMADEX) 20 mg tablet Take 1 tablet (20 mg total) by mouth daily (Patient taking differently: Take 20 mg by mouth daily as needed ) 30 tablet 11     No current facility-administered medications for this visit  Allergies   Allergen Reactions    Heparin      Annotation - [id number]: LOW PLATELETS    Phenazopyridine      As per daughter "Not appropriate for pt due to heart condition"         I spent 15minutes with the patient during this visit  Follow up visit       Oncology History   Adenocarcinoma determined by biopsy of liver ([facility] )   3/2020 Initial Diagnosis    Adenocarcinoma determined by biopsy of liver ([facility] )     3/25/2020 Biopsy    Liver, Mass, Biopsy:  -  Adenocarcinoma, well- differentiated      Note:  Tumor cells are  positive for CK7, CK19 and negative for arginase,  CDX2, CK20  TTF-1, Napsin     Based on this staining pattern , the primary site is favored to be from Cholangiocarcinoma, pancreaticobiliary region or upper GI     4/28/2020 - 5/8/2020 Radiation    Course: C1 SBRT Liver    Plan ID Energy Fractions Dose per Fraction (cGy) Dose Correction (cGy) Total Dose Delivered (cGy) Elapsed Days   SBRT Liver 6X 5 / 5 700 0 3,500 10      Treatment Dates:  4/28/2020 - 5/8/2020   (Dr Sobia Hobbs)         Clinical Trial: no      Health Maintenance   Topic Date Due    Medicare Annual Wellness Visit (AWV)  04/02/2020    Influenza Vaccine  07/01/2020    Fall Risk  05/12/2021    Depression Screening PHQ  05/12/2021    BMI: Adult  07/20/2021    DTaP,Tdap,and Td Vaccines (2 - Td) 03/14/2030    Pneumococcal Vaccine: 65+ Years  Completed    HIB Vaccine  Aged Out    Hepatitis B Vaccine  Aged Out    IPV Vaccine  Aged Out    Hepatitis A Vaccine  Aged Out    Meningococcal ACWY Vaccine  Aged Out    HPV Vaccine  Aged Out       Patient Active Problem List   Diagnosis    Hx of CABG    Hypertensive 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 and stage 1 through stage 4 chronic kidney disease, or chronic kidney disease (HCC)    Hyperlipidemia    Paroxysmal atrial fibrillation (HCC)    History of aortic valve replacement    Acute cystitis with hematuria    Arteriosclerosis of carotid artery    Cervical spine arthritis    Chronic cervical radiculopathy    Dystrophy of vulva    Mixed stress and urge urinary incontinence    Neural foraminal stenosis of cervical spine    Occipital neuralgia of right side    Osteoarthritis    Peripheral polyneuropathy    Bradycardia    Gastroesophageal reflux disease with esophagitis    Post herpetic neuralgia    Lower extremity edema    Pes anserinus bursitis of left knee    CKD (chronic kidney disease) stage 3, GFR 30-59 ml/min (HCC)    Sick sinus syndrome (HCC)    Abnormal CT of the abdomen    Hepatic lesion    Chronic diastolic congestive heart failure (HCC)    Anemia    Hypokalemia    Adenocarcinoma [facility] biopsy of liver ([facility] )    Coronary artery disease involving native coronary artery of native heart without angina pectoris    Lumbar foraminal stenosis    Lumbar radiculopathy    Synovial cyst of lumbar facet joint    Compression fracture of body of thoracic vertebra [facility])     Past Medical History:   Diagnosis Date    Acute myocardial infarction [facility])     Aortic valve disease     Arthritis     Atrial fibrillation (HCC)     CAD (coronary artery disease)     Cancer (HCC)     skin cancer removed from left leg    Cellulitis      AND ABSCESS    Cervical stenosis of spine     CHF (congestive heart failure) (HCC)     Cholecystitis     Closed Colles' fracture     OF THE LEFT WRIST; LAST ASSESSED: 5/2/14    Closed fracture of radius     LAST ASSESSED: 2/19/14    Diverticulosis     Dyspnea     Hair loss disorder     Hematuria     History of being hospitalized     1/5/12-1/14/12 [facility] PROCEDURES: 1) REDO STERNOTOMY AND AORTIC VALVE REPLACEMENT WITH A 19-MM REBA-PETERS BOVINIE PERICARDIAL MAGNA EASE VALVE 2) TRANSESOPHAGEAL ECHOCARDIOGRAM     Hypercholesterolemia     Hypertension     Incomplete bladder emptying     Leg wound, left     Liver cancer (HCC)     Malaise and fatigue     Renal disorder     Sick sinus syndrome (HCC)     Urinary tract infection      Past Surgical History:   Procedure Laterality Date    AORTIC VALVE REPLACEMENT  01/2012    HEART VALVE    CARDIAC SURGERY      CATARACT EXTRACTION W/  INTRAOCULAR LENS IMPLANT  08/26/2010    Sharyn Parham MD; PHACOEMULISIFICATION; INSERTION INTRAOCULAR LENS OD    COLONOSCOPY  03/08/2007    CORONARY ARTERY BYPASS GRAFT  01/2005    IR IMAGE GUIDED BIOPSY/ASPIRATION LIVER  3/25/2020    LEG SURGERY      AL KNEE SCOPE,MED/LAT MENISECTOMY Left 9/23/2019    Procedure: ARTHROSCOPY KNEE, LEFT PARTIAL MEDIAL MENISCECTOMY;  Surgeon: Tarun Thurman MD;  Location:  MAIN OR;  Service: Orthopedics     Family History   Problem Relation Age of Onset    Diabetes Mother         MELLITUS    Hypertension Mother     Heart disease Mother     Prostate cancer Father     Dementia Brother     Heart disease Maternal Grandmother     Hypertension Maternal Grandmother     Heart disease Maternal Grandfather     Hypertension Family     Osteoporosis Family     Substance Abuse Neg Hx     Mental illness Neg Hx      Social History     Socioeconomic History    Marital status:      Spouse name: Not on file    Number of children: Not on file    Years of education: Not on file    Highest education level: Not on file   Occupational History    Occupation: RETIRED   Social Needs    Financial resource strain: Not on file    Food insecurity     Worry: Not on file     Inability: Not on file    Transportation needs     Medical: No     Non-medical: No   Tobacco Use    Smoking status: Former Smoker    Smokeless tobacco: Never Used    Tobacco comment: QUIT 40 YEARS AGO ALSO NEVER A SMOKER AS PER [employer]   Substance and Sexual Activity    Alcohol use: Not Currently     Frequency: Never     Comment: social    Drug use: No    Sexual activity: Not Currently   Lifestyle    Physical activity     Days per week: Not on file     Minutes per session: Not on file    Stress: Not on file   Relationships    Social connections     Talks on phone: Not on file     Gets together: Not on file     Attends Alevism service: Not on file     Active member of club or organization: Not on file     Attends meetings of clubs or organizations: Not on file     Relationship status: Not on file    Intimate partner violence     Fear of current or ex partner: Not on file     Emotionally abused: Not on file     Physically abused: Not on file     Forced sexual activity: Not on file   Other Topics Concern    Not on file   Social History Narrative    Lives with son  Feels safe  Has living will      ACTIVE ADVANCE DIRECTIVE    CAFFEINE USE: 1 1/2 CUPS COFFEE QD    EXERCISE HABITS    GOOD DENTAL HYGIENE    LIVING SITUATION: SUPPORTIVE AND SAFE Current Outpatient Medications:     amLODIPine (NORVASC) 2 5 mg tablet, Take 1 tablet (2 5 mg total) by mouth daily, Disp: 90 tablet, Rfl: 3    apixaban (Eliquis) 5 mg, Take 1 tablet (5 mg total) by mouth 2 (two) times a day, Disp: 180 tablet, Rfl: 3    atorvastatin (LIPITOR) 20 mg tablet, Take 1 tablet (20 mg total) by mouth every evening, Disp: 90 tablet, Rfl: 3    clotrimazole-betamethasone (LOTRISONE) 1-0 05 % cream, Apply topically 2 (two) times a day, Disp: 30 g, Rfl: 3    Coenzyme Q10 (COQ10) 100 MG CAPS, Take 1 capsule by mouth Daily, Disp: , Rfl:     conjugated estrogens (PREMARIN) vaginal cream, Apply a pea size amount of the estrogen cream to the opening of the vagina three nights per week , Disp: , Rfl:     gabapentin (NEURONTIN) 100 mg capsule, Take 3 PO HS , Disp: 270 capsule, Rfl: 0    HYDROcodone-acetaminophen (NORCO) 5-325 mg per tablet, 1/2 to 1 tab p o  T i d  P r n   Pain, Disp: 45 tablet, Rfl: 0    Multiple Vitamins-Minerals (OCUVITE EXTRA) TABS, Take 1 tablet by mouth daily, Disp: , Rfl:     Omega-3 1000 MG CAPS, Take by mouth, Disp: , Rfl:     polyethylene glycol (GLYCOLAX) powder, Take 17 g by mouth daily, Disp: , Rfl:     potassium chloride (K-DUR,KLOR-CON) 20 mEq tablet, Take 1 tablet (20 mEq total) by mouth 2 (two) times a day, Disp: 60 tablet, Rfl: 11    senna-docusate sodium (SENOKOT S) 8 6-50 mg per tablet, Take 1 tablet by mouth daily, Disp: 9 tablet, Rfl: 0    torsemide (DEMADEX) 20 mg tablet, Take 1 tablet (20 mg total) by mouth daily (Patient taking differently: Take 20 mg by mouth daily as needed ), Disp: 30 tablet, Rfl: 11  Allergies   Allergen Reactions    Heparin      Annotation - [id number]: LOW PLATELETS    Phenazopyridine      As per daughter "Not appropriate for pt due to heart condition"       Review of Systems:  Review of Systems   Constitutional: Positive for activity change (uses a walker top get around with the back pain ) and fatigue (sleeps when she lays down wiht the back pain)  HENT: Negative  Eyes: Positive for visual disturbance  Respiratory: Negative  Cardiovascular: Positive for leg swelling (a little)  Gastrointestinal: Negative  Endocrine: Negative  Genitourinary:        Had had difficulty with voiding for awhile   Musculoskeletal: Positive for back pain (8-9/10 low back pain)  Skin: Negative  Allergic/Immunologic: Negative  Neurological: Negative  Hematological: Bruises/bleeds easily  Psychiatric/Behavioral: Positive for sleep disturbance  There were no vitals filed for this visit  Imaging:X-ray Thoracolumbar Spine 2 Views    Result Date: 7/24/2020  Narrative: THORACOLUMBAR SPINE INDICATION:   S22 000A: Wedge compression fracture of unspecified thoracic vertebra, initial encounter for closed fracture  COMPARISON: MR lumbar spine 7/8/2020, plain film radiograph 7/2/2020  VIEWS:  XR SPINE THORACOLUMBAR 2 VW FINDINGS: Superior endplate compression deformity at T11 with approximately 50% loss of vertebral body height appears similar to MR examination of 7/8/2020, when accounting for differences in modality    Mild lumbar dextroscoliosis  Grade 1 anterolisthesis of L4 on L5 Multilevel degenerative disc disease, most pronounced at L2-L3, similar to 7/2/2020  Lower lumbar facet arthropathy  The pedicles appear intact  Low-lying gallbladder with gallstones  Impression: Superior endplate compression deformity at T11 likely unchanged in degree of vertebral body height loss when comparing to MR examination of 7/8/2020  Multilevel degenerative disc disease  Workstation performed: [id number]     Xr Abdomen 1 View Kub    Result Date: 7/17/2020  Narrative: ABDOMEN INDICATION:   R10 9: Unspecified abdominal pain  COMPARISON:  CT chest abdomen pelvis 3/20/2020 VIEWS:  AP supine FINDINGS: Numerous right upper quadrant calculi in keeping with cholelithiasis  No radiopaque renal calculi seen   No radiopaque ureteral calculi identified  Mildly prominent stool throughout the colon with a nonobstructive bowel gas pattern  Mild lumbar degenerative changes  No apparent acute osseous deformity  Impression: Numerous right upper quadrant calculi in keeping with cholelithiasis  Workstation performed: [id number]     Mri Abdomen W Wo Contrast And Mrcp    Result Date: 8/4/2020  Narrative: MRI OF THE ABDOMEN WITH AND WITHOUT CONTRAST WITH MRCP INDICATION:  Dr Ari Gordon note from 6/8/2020 was reviewed  At that time, patient is one month status post completion of 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  COMPARISON: Abdomen MR from 3/20/2020  TECHNIQUE:  The following pulse sequences were obtained:  Coronal and axial T2 with TE of 90 and 180 respectively, axial T2 with fat saturation, axial FIESTA fat-sat, axial T1-weighted in-and-out-of phase, axial DWI/ADC, pre-contrast axial T1 with fat saturation, post-contrast dynamic axial T1 with fat saturation at 20, 70, and 180 seconds, followed by coronal and 7 minute delayed axial T1 with fat saturation  3D MRCP images were obtained with radial thick slabs and projections  3D rendering was performed from the acquisition scanner  IV Contrast:  5 mL of Gadobutrol injection (SINGLE-DOSE) FINDINGS: LOWER CHEST:   Unremarkable  LIVER: Normal in size and configuration  Again seen is a left hepatic lobe lesion, predominantly within segment 4, which is hypointense on T1-weighted sequences, mildly hyperintense on T2-weighted sequences, demonstrating gradual delayed homogeneous enhancement, consistent with cholangiocarcinoma  It has increased in size compared to prior, currently measuring 3 7 x 3 2 cm, previously 3 2 x 2 5 cm (compare series 10 image 71 with series 11 image 39 of the prior study ) There is unchanged intrahepatic biliary ductal dilatation in segment 2/3 due to this mass    There is also a new 0 9 x 0 9 cm right hepatic lobe segment 7 lesion, hypointense on T1 weighted sequences, mildly T2 hyperintense, demonstrating gradual enhancement, consistent with a metastasis (series 10 image 56 ) Lesion also demonstrates restricted diffusion in segment 7 (series 9 image 96 ) The hepatic veins and portal veins are patent  BILE DUCTS:  See above regarding the dilated segment 2/3 ducts  Rest of the biliary tree is nondilated  GALLBLADDER:  Multiple gallstones in the gallbladder  PANCREAS:  Normal  No main pancreatic ductal dilation  ADRENAL GLANDS:  Normal  SPLEEN:  Normal  KIDNEYS/PROXIMAL URETERS:  No hydroureteronephrosis  No suspicious renal mass  BOWEL:   No dilated loops of bowel  PERITONEUM/RETROPERITONEUM:  No ascites  LYMPH NODES:  No abdominal lymphadenopathy  VASCULAR STRUCTURES:  No aneurysm  ABDOMINAL WALL:  Unremarkable  OSSEOUS STRUCTURES:  No suspicious osseous lesion  Impression: In this patient with a hepatic cholangiocarcinoma status post radiation therapy, compared to 3/20/2020 MRI, the primary tumor located predominantly in segment 4 has increased in size currently measuring 3 7 x 3 2 cm, previously 3 2 x 2 5 cm (compare series 10 image 71 with series 11 image 39 of the prior study ) There is unchanged intrahepatic biliary ductal dilatation in segment 2/3 due to this mass  There is also a new 0 9 x 0 9 cm right hepatic lobe segment 7 metastasis (series 10 image 56 ) The study was marked in EPIC for significant notification  Workstation performed: [id number]     Fl Spine And Pain Procedure    Result Date: 7/16/2020  Narrative: Place of Service: Procedure Room  Diagnosis:  Lumbar foraminal stenosis, lumbar spine facet cyst with lumbar radiculitis  Procedure:  Right L5 Transforaminal Epidural Injection under Fluoroscopy  Indication for Fluoroscopy: This procedure requires the precise placement of the spinal needle into the specific paravertebral foramen  The only way to accurately and safely perform the injection   Medical necessity: Failure of conservative medical management  Procedure Note: After fully informed consent was obtained, the patient was then positioned on the fluoroscopy table in the prone position with a pillow beneath the pelvis to reduce lumbar lordosis  The lumbar area was prepped with ChloraPrep and draped in the usual manner  The fluoroscope was used to identify the L3///L4///L5 vertebral body on the AP projection  It was then rotated into an oblique projection until the superior articulating process of the S1 (inferior) vertebra is projected beneath the 6 o'clock position of the L5 (superior) vertebrae  Using a 25 gauge needle, 1% lidocaine was injected for local anesthetic  The 22 gauge 5 inch curved tip needle was inserted in the skin at a point overlying the superior articulating process of the inferior vertebra and aimed for the 6 o'clock position of the superior vertebrae's pedicle  After the needle contacted bone, a lateral projection was obtained to insure that the needle tip was in proximity with the vertebral body  Paresthesias were not noted  After gentle negative aspiration,  Omnipaque 300 was injected under live fluoroscopy  Digital subtraction was utilized  No vascular uptake was noted  Following demonstration of the neurogram, 2% lidocaine was injected followed by Methylprednisolone  There was no CSF,  nor heme identified  Disposition: The patient was brought to the recovery room in stable condition  Strength and sensation were tested in both lower extremities and were found to be intact  Vital signs were stable  The patient tolerated the procedure well  The patient was discharged home with a  and discharge instructions were reviewed and given to the patient   Estimated blood loss:  Minimal No specimens were taken

## 2020-08-11 ENCOUNTER — OFFICE VISIT (OUTPATIENT)
Dept: FAMILY MEDICINE CLINIC | Facility: HOSPITAL | Age: 85
End: 2020-08-11
Payer: MEDICARE

## 2020-08-11 VITALS
DIASTOLIC BLOOD PRESSURE: 68 MMHG | SYSTOLIC BLOOD PRESSURE: 142 MMHG | BODY MASS INDEX: 20.38 KG/M2 | HEIGHT: 63 IN | TEMPERATURE: 98.9 F | HEART RATE: 74 BPM | RESPIRATION RATE: 16 BRPM | WEIGHT: 115 LBS

## 2020-08-11 DIAGNOSIS — I48.0 PAROXYSMAL ATRIAL FIBRILLATION (HCC): Chronic | ICD-10-CM

## 2020-08-11 DIAGNOSIS — S22.000A COMPRESSION FRACTURE OF BODY OF THORACIC VERTEBRA (HCC): ICD-10-CM

## 2020-08-11 DIAGNOSIS — M54.16 LUMBAR RADICULOPATHY: ICD-10-CM

## 2020-08-11 DIAGNOSIS — I25.10 CORONARY ARTERY DISEASE INVOLVING NATIVE CORONARY ARTERY OF NATIVE HEART WITHOUT ANGINA PECTORIS: ICD-10-CM

## 2020-08-11 DIAGNOSIS — C22.9 ADENOCARCINOMA DETERMINED BY BIOPSY OF LIVER (HCC): Primary | ICD-10-CM

## 2020-08-11 PROBLEM — E87.6 HYPOKALEMIA: Status: RESOLVED | Noted: 2020-03-30 | Resolved: 2020-08-11

## 2020-08-11 PROCEDURE — 3008F BODY MASS INDEX DOCD: CPT | Performed by: INTERNAL MEDICINE

## 2020-08-11 PROCEDURE — 4040F PNEUMOC VAC/ADMIN/RCVD: CPT | Performed by: INTERNAL MEDICINE

## 2020-08-11 PROCEDURE — 3077F SYST BP >= 140 MM HG: CPT | Performed by: INTERNAL MEDICINE

## 2020-08-11 PROCEDURE — 1036F TOBACCO NON-USER: CPT | Performed by: INTERNAL MEDICINE

## 2020-08-11 PROCEDURE — 99214 OFFICE O/P EST MOD 30 MIN: CPT | Performed by: INTERNAL MEDICINE

## 2020-08-11 PROCEDURE — 1160F RVW MEDS BY RX/DR IN RCRD: CPT | Performed by: INTERNAL MEDICINE

## 2020-08-11 PROCEDURE — 3078F DIAST BP <80 MM HG: CPT | Performed by: INTERNAL MEDICINE

## 2020-08-11 RX ORDER — DULOXETIN HYDROCHLORIDE 20 MG/1
20 CAPSULE, DELAYED RELEASE ORAL DAILY
Qty: 30 CAPSULE | Refills: 3 | Status: SHIPPED | OUTPATIENT
Start: 2020-08-11

## 2020-08-11 NOTE — ASSESSMENT & PLAN NOTE
Now using  A walker- sleeping downstairs- saw Dr Jeff Patel and had injection with limited improvement- then saw neurosurgeon- referred back to pain management  then saw [facility]- had second injection 5 days ago- now on percoet  3x day  And 3 gabapentin at bedtime   was given brace by ortho- difficult to walk and brace rides up when walking   daughter  thinks since fall in March

## 2020-08-11 NOTE — ASSESSMENT & PLAN NOTE
Had  Radiation treatments- size larger than last on recent MRI- to see oncology team again- upcoming- to see about other options- Dr Jewels Mcneal oncology team     appetite is ok and she is motivated to walk other than her back pain

## 2020-08-11 NOTE — PROGRESS NOTES
Assessment/Plan:             Problem List Items Addressed This Visit        Digestive    Adenocarcinoma determined by biopsy of liver ([facility] [address] ) - Primary     Had  Radiation treatments- size larger than last on recent MRI- to see oncology team again- upcoming- to see about other options- Dr Grisel Smiley oncology team     appetite is ok and she is motivated to walk other than her back pain         Relevant Orders    CBC and differential    Comprehensive metabolic panel       Cardiovascular and Mediastinum    Paroxysmal atrial fibrillation (HCC) (Chronic)    Relevant Orders    CBC and differential    Comprehensive metabolic panel    Coronary artery disease involving native coronary artery of native heart without angina pectoris     No angina- no sob with exertion         Relevant Orders    CBC and differential    Comprehensive metabolic panel       Nervous and Auditory    Lumbar radiculopathy    Relevant Medications    DULoxetine (CYMBALTA) 20 mg capsule       Musculoskeletal and Integument    Compression fracture of body of thoracic vertebra (HCC)     Now using  A walker- sleeping downstairs- saw Dr Preet Arenas and had injection with limited improvement- then saw neurosurgeon- referred back to pain management  then saw [facility]- had second injection 5 days ago- now on percoet  3x day  And 3 gabapentin at bedtime   was given brace by ortho- difficult to walk and brace rides up when walking  daughter  thinks since fall in March                 Subjective:      Patient ID: Ml Busby is a 80 y o  female    1  Adenocarcinoma- to see oncology  2  Back pain- will restart cympbalta- had tolerated in past- radiates down right leg- L4-l5 realted  3  covid exposure- daughter was tested as negative      The following portions of the patient's history were reviewed and updated as appropriate: allergies, current medications and problem list      Review of Systems   HENT: Negative for congestion      Cardiovascular: Negative for chest pain  Less leg edema   Skin:        Back pain ongoing   All other systems reviewed and are negative  Objective:      Current Outpatient Medications:     amLODIPine (NORVASC) 2 5 mg tablet, Take 1 tablet (2 5 mg total) by mouth daily, Disp: 90 tablet, Rfl: 3    apixaban (Eliquis) 5 mg, Take 1 tablet (5 mg total) by mouth 2 (two) times a day, Disp: 180 tablet, Rfl: 3    atorvastatin (LIPITOR) 20 mg tablet, Take 1 tablet (20 mg total) by mouth every evening, Disp: 90 tablet, Rfl: 3    clotrimazole-betamethasone (LOTRISONE) 1-0 05 % cream, Apply topically 2 (two) times a day, Disp: 30 g, Rfl: 3    Coenzyme Q10 (COQ10) 100 MG CAPS, Take 1 capsule by mouth Daily, Disp: , Rfl:     conjugated estrogens (PREMARIN) vaginal cream, Apply a pea size amount of the estrogen cream to the opening of the vagina three nights per week , Disp: , Rfl:     gabapentin (NEURONTIN) 100 mg capsule, Take 3 PO HS , Disp: 270 capsule, Rfl: 0    HYDROcodone-acetaminophen (NORCO) 5-325 mg per tablet, 1/2 to 1 tab p o  T i d  P r n  Pain (Patient taking differently: 1 tab p o  T i d  P r n   Pain), Disp: 45 tablet, Rfl: 0    Multiple Vitamins-Minerals (OCUVITE EXTRA) TABS, Take 1 tablet by mouth daily, Disp: , Rfl:     Omega-3 1000 MG CAPS, Take by mouth, Disp: , Rfl:     polyethylene glycol (GLYCOLAX) powder, Take 17 g by mouth daily, Disp: , Rfl:     potassium chloride (K-DUR,KLOR-CON) 20 mEq tablet, Take 1 tablet (20 mEq total) by mouth 2 (two) times a day, Disp: 60 tablet, Rfl: 11    senna-docusate sodium (SENOKOT S) 8 6-50 mg per tablet, Take 1 tablet by mouth daily, Disp: 9 tablet, Rfl: 0    torsemide (DEMADEX) 20 mg tablet, Take 1 tablet (20 mg total) by mouth daily (Patient taking differently: Take 20 mg by mouth daily as needed ), Disp: 30 tablet, Rfl: 11    DULoxetine (CYMBALTA) 20 mg capsule, Take 1 capsule (20 mg total) by mouth daily, Disp: 30 capsule, Rfl: 3    Blood pressure 142/68, pulse 74, temperature 98 9 °F (37 2 °C), temperature source Tympanic, resp  rate 16, height 5' 3" (1 6 m), weight 52 2 kg (115 lb), not currently breastfeeding       Physical Exam

## 2020-08-12 NOTE — TELEPHONE ENCOUNTER
Called spoke with daughter and she said that they canceled due to dr elie referring them to another provider and that provider referred them to someone else

## 2020-08-13 ENCOUNTER — TELEMEDICINE (OUTPATIENT)
Dept: NEUROSURGERY | Facility: CLINIC | Age: 85
End: 2020-08-13
Payer: MEDICARE

## 2020-08-13 DIAGNOSIS — M48.061 LUMBAR FORAMINAL STENOSIS: Primary | ICD-10-CM

## 2020-08-13 DIAGNOSIS — M43.16 SPONDYLOLISTHESIS OF LUMBAR REGION: ICD-10-CM

## 2020-08-13 DIAGNOSIS — M41.25 OTHER IDIOPATHIC SCOLIOSIS, THORACOLUMBAR REGION: ICD-10-CM

## 2020-08-13 DIAGNOSIS — S22.000A COMPRESSION FRACTURE OF BODY OF THORACIC VERTEBRA (HCC): ICD-10-CM

## 2020-08-13 PROCEDURE — G2012 BRIEF CHECK IN BY MD/QHP: HCPCS | Performed by: NEUROLOGICAL SURGERY

## 2020-08-13 PROCEDURE — 3077F SYST BP >= 140 MM HG: CPT | Performed by: NEUROLOGICAL SURGERY

## 2020-08-13 PROCEDURE — 4040F PNEUMOC VAC/ADMIN/RCVD: CPT | Performed by: NEUROLOGICAL SURGERY

## 2020-08-13 PROCEDURE — 3078F DIAST BP <80 MM HG: CPT | Performed by: NEUROLOGICAL SURGERY

## 2020-08-13 PROCEDURE — 1036F TOBACCO NON-USER: CPT | Performed by: NEUROLOGICAL SURGERY

## 2020-08-20 ENCOUNTER — DOCUMENTATION (OUTPATIENT)
Dept: HEMATOLOGY ONCOLOGY | Facility: CLINIC | Age: 85
End: 2020-08-20

## 2020-08-20 NOTE — PROGRESS NOTES
Rectal/GI Multidisciplinary Case Review date: 8/20/2020      Presenting Doctor: Dr Erik Oshea      Diagnosis: Liver Adenocarcinoma      Maximus Byrnes was presented at the Rectal/GI Multidisciplinary Conference today  PHYSICIAN RECOMMENDED PLAN:    -Liver directed therapy with embolization and percutaneous ablation   -Consult Medical Oncology if patient would like to     -Dr Erik Oshea will discuss the above with patient and if she would like to consult with Medical Oncology, will help set up    Team agreed to plan

## 2020-08-21 ENCOUNTER — TELEMEDICINE (OUTPATIENT)
Dept: RADIATION ONCOLOGY | Facility: HOSPITAL | Age: 85
End: 2020-08-21
Attending: RADIOLOGY

## 2020-08-21 DIAGNOSIS — C22.9 ADENOCARCINOMA DETERMINED BY BIOPSY OF LIVER (HCC): Primary | ICD-10-CM

## 2020-08-21 NOTE — PROGRESS NOTES
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, approximately 3 months ago  She was seen in follow-up last week and at that time we discussed the results of her most recent MRI  This unfortunately showed progression of disease in the recently treated left lobe as well as a new focus of disease in the right lobe of the liver  At the time of that visit, we had recommended presenting her case at the multidisciplinary upper GI working group in order to obtain further recommendations  Her case was presented yesterday and the recommendations were as follows: There is no role for additional radiation at this time, either with external beam or radioembolization  Liver directed therapy is 1 possibility, in the form of chemo embolization to the left lobe while utilizing radiofrequency ablation to treat the small new lesion in the right lobe  A 2nd option would be to meet with Medical Oncology for potential targeted or immunotherapy  A 3rd option would be comfort measures alone  The patient and her mother would prefer to at least hear directly from both interventional radiology and Medical Oncology and then make a decision regarding which modality they would prefer  We will work with the GI nurse navigator to arrange for these visits  Problem List Items Addressed This Visit        Digestive    Adenocarcinoma determined by biopsy of liver ([facility] [address] ) - Primary                Reason for visit is   Chief Complaint   Patient presents with    Follow-up        Encounter provider Sohail Perez MD    Provider located at [address] [id number]    Recent Visits  No visits were found meeting these conditions     Showing recent visits within past 7 days and meeting all other requirements     Today's Visits  Date Type Provider Dept   08/21/20 Telemedicine Min Vargas MD [facility]   Showing today's visits and meeting all other requirements     Future Appointments  No visits were found meeting these conditions  Showing future appointments within next 150 days and meeting all other requirements        After connecting through telephone, the patient was identified by name and date of birth  Qing Dominique was informed that this is a telemedicine visit and that the visit is being conducted through telephone  My office door was closed  No one else was in the room  She acknowledged consent and understanding of privacy and security of the platform  The patient has agreed to participate and understands she can discontinue the visit at any time  Patient is aware this is a billable service  Subjective    Qing Dominique is a 80 y o  female, having recently completed a course of stereotactic radiation for 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, approximately 3 months ago  She was seen in follow-up last week and at that time we discussed the results of her most recent MRI  This unfortunately showed progression of disease in the recently treated left lobe as well as a new focus of disease in the right lobe of the liver  At the time of that visit, we had recommended presenting her case at the multidisciplinary upper GI working group in order to obtain further recommendations  We have called her today with said recommendations, namely liver directed therapy with a combination of TACE directed at the left lobe, and RFA to address the new small lesion in the right, vs  Referral to Medical Oncology for possible targeted or immunotherapy      HPI     Past Medical History:   Diagnosis Date    Acute myocardial infarction [facility])     Aortic valve disease     Arthritis     Atrial fibrillation ([facility] [address] )     CAD (coronary artery disease)     Cancer (HCC)     skin cancer removed from left leg    Cellulitis      AND ABSCESS    Cervical stenosis of spine     CHF (congestive heart failure) (HCC)     Cholecystitis     Closed Colles' fracture     OF THE LEFT WRIST; LAST ASSESSED: 5/2/14    Closed fracture of radius     LAST ASSESSED: 2/19/14    Diverticulosis     Dyspnea     Hair loss disorder     Hematuria     History of being hospitalized     1/5/12-1/14/12 MARCE ROSARIO  [facility] PROCEDURES: 1) REDO STERNOTOMY AND AORTIC VALVE REPLACEMENT WITH A 19-MM REBA-PETERS BOVINIE PERICARDIAL MAGNA EASE VALVE 2) TRANSESOPHAGEAL ECHOCARDIOGRAM     Hypercholesterolemia     Hypertension     Incomplete bladder emptying     Leg wound, left     Liver cancer (HCC)     Malaise and fatigue     Renal disorder     Sick sinus syndrome (HCC)     Urinary tract infection        Past Surgical History:   Procedure Laterality Date    AORTIC VALVE REPLACEMENT  01/2012    HEART VALVE    CARDIAC SURGERY      CATARACT EXTRACTION W/  INTRAOCULAR LENS IMPLANT  08/26/2010    Bassam Munson MD; PHACOEMULISIFICATION; INSERTION INTRAOCULAR LENS OD    COLONOSCOPY  03/08/2007    CORONARY ARTERY BYPASS GRAFT  01/2005    IR IMAGE GUIDED BIOPSY/ASPIRATION LIVER  3/25/2020    LEG SURGERY      NC KNEE SCOPE,MED/LAT MENISECTOMY Left 9/23/2019    Procedure: ARTHROSCOPY KNEE, LEFT PARTIAL MEDIAL MENISCECTOMY;  Surgeon: Augusto Shaw MD;  Location:  MAIN OR;  Service: Orthopedics       Current Outpatient Medications   Medication Sig Dispense Refill    amLODIPine (NORVASC) 2 5 mg tablet Take 1 tablet (2 5 mg total) by mouth daily 90 tablet 3    apixaban (Eliquis) 5 mg Take 1 tablet (5 mg total) by mouth 2 (two) times a day 180 tablet 3    atorvastatin (LIPITOR) 20 mg tablet Take 1 tablet (20 mg total) by mouth every evening 90 tablet 3    clotrimazole-betamethasone (LOTRISONE) 1-0 05 % cream Apply topically 2 (two) times a day 30 g 3    Coenzyme Q10 (COQ10) 100 MG CAPS Take 1 capsule by mouth Daily      conjugated estrogens (PREMARIN) vaginal cream Apply a pea size amount of the estrogen cream to the opening of the vagina three nights per week   DULoxetine (CYMBALTA) 20 mg capsule Take 1 capsule (20 mg total) by mouth daily 30 capsule 3    gabapentin (NEURONTIN) 100 mg capsule Take 3 PO HS  270 capsule 0    HYDROcodone-acetaminophen (NORCO) 5-325 mg per tablet 1/2 to 1 tab p o  T i d  P r n  Pain (Patient taking differently: 1 tab p o  T i d  P r n  Pain) 45 tablet 0    Multiple Vitamins-Minerals (OCUVITE EXTRA) TABS Take 1 tablet by mouth daily      Omega-3 1000 MG CAPS Take by mouth      polyethylene glycol (GLYCOLAX) powder Take 17 g by mouth daily      potassium chloride (K-DUR,KLOR-CON) 20 mEq tablet Take 1 tablet (20 mEq total) by mouth 2 (two) times a day 60 tablet 11    senna-docusate sodium (SENOKOT S) 8 6-50 mg per tablet Take 1 tablet by mouth daily 9 tablet 0    torsemide (DEMADEX) 20 mg tablet Take 1 tablet (20 mg total) by mouth daily (Patient taking differently: Take 20 mg by mouth daily as needed ) 30 tablet 11     No current facility-administered medications for this visit  Allergies   Allergen Reactions    Heparin      Annotation - [id number]: LOW PLATELETS    Phenazopyridine      As per daughter "Not appropriate for pt due to heart condition"       Review of Systems    There were no vitals filed for this visit  VIRTUAL VISIT DISCLAIMER    Kristeen Kehr acknowledges that she has consented to an online visit or consultation  She understands that the online visit is based solely on information provided by her, and that, in the absence of a face-to-face physical evaluation by the physician, the diagnosis she receives is both limited and provisional in terms of accuracy and completeness  This is not intended to replace a full medical face-to-face evaluation by the physician  Kristeen Kehr understands and accepts these terms

## 2020-08-21 NOTE — PROGRESS NOTES
Virtual Regular Visit    Problem List Items Addressed This Visit     None               Reason for visit is follow-up  Encounter provider Patel Sinclair MD    Provider located at [address] [id number]    Recent Visits  No visits were found meeting these conditions  Showing recent visits within past 7 days and meeting all other requirements     Today's Visits  Date Type Provider Dept   08/21/20 Telemedicine Patel Sinclair MD [facility]   Showing today's visits and meeting all other requirements     Future Appointments  No visits were found meeting these conditions  Showing future appointments within next 150 days and meeting all other requirements        After connecting through [facility], the patient was identified by name and date of birth  Nohemy Beltran was informed that this is a telemedicine visit and that the visit is being conducted through telephone which may not be secure and therefore, might not be HIPAA-compliant  My office door was closed  No one else was in the room  She acknowledged consent and understanding of privacy and security of the video platform  The patient has agreed to participate and understands they can discontinue the visit at any time  Subjective    Nohemy Beltran is a 80 y o  female         Past Medical History:   Diagnosis Date    Acute myocardial infarction [facility])     Aortic valve disease     Arthritis     Atrial fibrillation ([facility] [address] )     CAD (coronary artery disease)     Cancer (HCC)     skin cancer removed from left leg    Cellulitis      AND ABSCESS    Cervical stenosis of spine     CH[facility]congestive heart failure) ([facility])     Cholecystitis     Closed Colles' fracture     OF THE LEFT WRIST; LAST ASSESSED: 5/2/14    Closed fracture of radius     LAST ASSESSED: 2/19/14    Diverticulosis     Dyspnea     Hair loss disorder     Hematuria     History of being hospitalized     1/5/12-1/14/12 Juan Diego MACIEL PROCEDURES: 1) REDO STERNOTOMY AND AORTIC VALVE REPLACEMENT WITH A 19-MM REBA-PETERS BOVINIE PERICARDIAL MAGNA EASE VALVE 2) TRANSESOPHAGEAL ECHOCARDIOGRAM     Hypercholesterolemia     Hypertension     Incomplete bladder emptying     Leg wound, left     Liver cancer (HCC)     Malaise and fatigue     Renal disorder     Sick sinus syndrome (HCC)     Urinary tract infection        Past Surgical History:   Procedure Laterality Date    AORTIC VALVE REPLACEMENT  01/2012    HEART VALVE    CARDIAC SURGERY      CATARACT EXTRACTION W/  INTRAOCULAR LENS IMPLANT  08/26/2010    Eliane Young MD; PHACOEMULISIFICATION; INSERTION INTRAOCULAR LENS OD    COLONOSCOPY  03/08/2007    CORONARY ARTERY BYPASS GRAFT  01/2005    IR IMAGE GUIDED BIOPSY/ASPIRATION LIVER  3/25/2020    LEG SURGERY      OK KNEE SCOPE,MED/LAT MENISECTOMY Left 9/23/2019    Procedure: ARTHROSCOPY KNEE, LEFT PARTIAL MEDIAL MENISCECTOMY;  Surgeon: Nicole Robert MD;  Location:  MAIN OR;  Service: Orthopedics       Current Outpatient Medications   Medication Sig Dispense Refill    amLODIPine (NORVASC) 2 5 mg tablet Take 1 tablet (2 5 mg total) by mouth daily 90 tablet 3    apixaban (Eliquis) 5 mg Take 1 tablet (5 mg total) by mouth 2 (two) times a day 180 tablet 3    atorvastatin (LIPITOR) 20 mg tablet Take 1 tablet (20 mg total) by mouth every evening 90 tablet 3    clotrimazole-betamethasone (LOTRISONE) 1-0 05 % cream Apply topically 2 (two) times a day 30 g 3    Coenzyme Q10 (COQ10) 100 MG CAPS Take 1 capsule by mouth Daily      conjugated estrogens (PREMARIN) vaginal cream Apply a pea size amount of the estrogen cream to the opening of the vagina three nights per week        DULoxetine (CYMBALTA) 20 mg capsule Take 1 capsule (20 mg total) by mouth daily 30 capsule 3    gabapentin (NEURONTIN) 100 mg capsule Take 3 PO HS  270 capsule 0    HYDROcodone-acetaminophen (NORCO) 5-325 mg per tablet 1/2 to 1 tab p o  T i d  P r n  Pain (Patient taking differently: 1 tab p o  T i d  P r n  Pain) 45 tablet 0    Multiple Vitamins-Minerals (OCUVITE EXTRA) TABS Take 1 tablet by mouth daily      Omega-3 1000 MG CAPS Take by mouth      polyethylene glycol (GLYCOLAX) powder Take 17 g by mouth daily      potassium chloride (K-DUR,KLOR-CON) 20 mEq tablet Take 1 tablet (20 mEq total) by mouth 2 (two) times a day 60 tablet 11    senna-docusate sodium (SENOKOT S) 8 6-50 mg per tablet Take 1 tablet by mouth daily 9 tablet 0    torsemide (DEMADEX) 20 mg tablet Take 1 tablet (20 mg total) by mouth daily (Patient taking differently: Take 20 mg by mouth daily as needed ) 30 tablet 11     No current facility-administered medications for this visit  Allergies   Allergen Reactions    Heparin      Annotation - [id number]: LOW PLATELETS    Phenazopyridine      As per daughter "Not appropriate for pt due to heart condition"         I spent  minutes with the patient during this visit  Follow up visit       Oncology History   Adenocarcinoma determined by biopsy of liver ([facility] [address] )   3/2020 Initial Diagnosis    Adenocarcinoma determined by biopsy of liver ([facility] [address] )     3/25/2020 Biopsy    Liver, Mass, Biopsy:  -  Adenocarcinoma, well- differentiated      Note:  Tumor cells are  positive for CK7, CK19 and negative for arginase,  CDX2, CK20  TTF-1, Napsin     Based on this staining pattern , the primary site is favored to be from Cholangiocarcinoma, pancreaticobiliary region or upper GI     4/28/2020 - 5/8/2020 Radiation    Course: C1 SBRT Liver    Plan ID Energy Fractions Dose per Fraction (cGy) Dose Correction (cGy) Total Dose Delivered (cGy) Elapsed Days   SBRT Liver 6X 5 / 5 700 0 3,500 10      Treatment Dates:  4/28/2020 - 5/8/2020   (Dr Hatfield Like)         Clinical Trial: no      Health Maintenance   Topic Date Due    Medicare Annual Wellness Visit (AWV)  04/02/2020    Influenza Vaccine  07/01/2020    Fall Risk 05/12/2021    Depression Screening PHQ  08/10/2021    BMI: Adult  08/11/2021    DTaP,Tdap,and Td Vaccines (2 - Td) 03/14/2030    Pneumococcal Vaccine: 65+ Years  Completed    HIB Vaccine  Aged Out    Hepatitis B Vaccine  Aged Out    IPV Vaccine  Aged Out    Hepatitis A Vaccine  Aged Out    Meningococcal ACWY Vaccine  Aged Out    HPV Vaccine  Aged Out       Patient Active Problem List   Diagnosis    Hx of CABG    Hypertensive 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 and stage 1 through stage 4 chronic kidney disease, or chronic kidney disease (HCC)    Hyperlipidemia    Paroxysmal atrial fibrillation (HCC)    History of aortic valve replacement    Acute cystitis with hematuria    Arteriosclerosis of carotid artery    Cervical spine arthritis    Chronic cervical radiculopathy    Dystrophy of vulva    Mixed stress and urge urinary incontinence    Neural foraminal stenosis of cervical spine    Occipital neuralgia of right side    Osteoarthritis    Peripheral polyneuropathy    Bradycardia    Gastroesophageal reflux disease with esophagitis    Post herpetic neuralgia    Lower extremity edema    Pes anserinus bursitis of left knee    CKD (chronic kidney disease) stage 3, GFR 30-59 ml/min (HCC)    Sick sinus syndrome (HCC)    Abnormal CT of the abdomen    Hepatic lesion    Chronic diastolic congestive heart failure (HCC)    Anemia    Adenocarcinoma determined by biopsy of liver ([facility] [address] )    Coronary artery disease involving native coronary artery of native heart without angina pectoris    Lumbar foraminal stenosis    Lumbar radiculopathy    Synovial cyst of lumbar facet joint    Compression fracture of body of thoracic vertebra ([address] )    Other idiopathic scoliosis, thoracolumbar region    Spondylolisthesis of lumbar region     Past Medical History:   Diagnosis Date    Acute myocardial infarction ([address] )     Aortic valve disease     Arthritis     Atrial fibrillation ([address] )  CAD (coronary artery disease)     Cancer (HCC)     skin cancer removed from left leg    Cellulitis      AND ABSCESS    Cervical stenosis of spine     CHF (congestive heart failure) (HCC)     Cholecystitis     Closed Colles' fracture     OF THE LEFT WRIST; LAST ASSESSED: 5/2/14    Closed fracture of radius     LAST ASSESSED: 2/19/14    Diverticulosis     Dyspnea     Hair loss disorder     Hematuria     History of being hospitalized     1/5/12-1/14/12 Juan Diego MACIEL PROCEDURES: 1) REDO STERNOTOMY AND AORTIC VALVE REPLACEMENT WITH A 19-MM REBA-PETERS BOVINIE PERICARDIAL MAGNA EASE VALVE 2) TRANSESOPHAGEAL ECHOCARDIOGRAM     Hypercholesterolemia     Hypertension     Incomplete bladder emptying     Leg wound, left     Liver cancer (HCC)     Malaise and fatigue     Renal disorder     Sick sinus syndrome ([address] )     Urinary tract infection      Past Surgical History:   Procedure Laterality Date    AORTIC VALVE REPLACEMENT  01/2012    HEART VALVE    CARDIAC SURGERY      CATARACT EXTRACTION W/  INTRAOCULAR LENS IMPLANT  08/26/2010    Tiburcio Busby MD; PHACOEMULISIFICATION; INSERTION INTRAOCULAR LENS OD    COLONOSCOPY  03/08/2007    CORONARY ARTERY BYPASS GRAFT  01/2005    IR IMAGE GUIDED BIOPSY/ASPIRATION LIVER  3/25/2020    LEG SURGERY      [place] KNEE SCOPE,MED/LAT MENISECTOMY Left 9/23/2019    Procedure: ARTHROSCOPY KNEE, LEFT PARTIAL MEDIAL MENISCECTOMY;  Surgeon: Canelo Covington MD;  Location:  MAIN OR;  Service: Orthopedics     Family History   Problem Relation Age of Onset    Diabetes Mother         MELLITUS    Hypertension Mother     Heart disease Mother     Prostate cancer Father     Dementia Brother     Heart disease Maternal Grandmother     Hypertension Maternal Grandmother     Heart disease Maternal Grandfather     Hypertension Family     Osteoporosis Family     Substance Abuse Neg Hx     Mental illness Neg Hx      Social History     Socioeconomic History    Marital status:      Spouse name: Not on file    Number of children: Not on file    Years of education: Not on file    Highest education level: Not on file   Occupational History    Occupation: RETIRED   Social Needs    Financial resource strain: Not on file    Food insecurity     Worry: Not on file     Inability: Not on file    Transportation needs     Medical: No     Non-medical: No   Tobacco Use    Smoking status: Former Smoker    Smokeless tobacco: Never Used    Tobacco comment: QUIT 40 YEARS AGO ALSO NEVER A SMOKER AS PER [employer]   Substance and Sexual Activity    Alcohol use: Not Currently     Frequency: Never     Comment: social    Drug use: No    Sexual activity: Not Currently   Lifestyle    Physical activity     Days per week: 0 days     Minutes per session: 0 min    Stress: Rather much   Relationships    Social connections     Talks on phone: Not on file     Gets together: Not on file     Attends Gnosticism service: Not on file     Active member of club or organization: Not on file     Attends meetings of clubs or organizations: Not on file     Relationship status: Not on file    Intimate partner violence     Fear of current or ex partner: No     Emotionally abused: No     Physically abused: No     Forced sexual activity: No   Other Topics Concern    Not on file   Social History Narrative    Lives with son  Feels safe  Has living will      ACTIVE ADVANCE DIRECTIVE    CAFFEINE USE: 1 1/2 CUPS COFFEE QD    EXERCISE HABITS    GOOD DENTAL HYGIENE    LIVING SITUATION: SUPPORTIVE AND SAFE       Current Outpatient Medications:     amLODIPine (NORVASC) 2 5 mg tablet, Take 1 tablet (2 5 mg total) by mouth daily, Disp: 90 tablet, Rfl: 3    apixaban (Eliquis) 5 mg, Take 1 tablet (5 mg total) by mouth 2 (two) times a day, Disp: 180 tablet, Rfl: 3    atorvastatin (LIPITOR) 20 mg tablet, Take 1 tablet (20 mg total) by mouth every evening, Disp: 90 tablet, Rfl: 3    clotrimazole-betamethasone (LOTRISONE) 1-0 05 % cream, Apply topically 2 (two) times a day, Disp: 30 g, Rfl: 3    Coenzyme Q10 (COQ10) 100 MG CAPS, Take 1 capsule by mouth Daily, Disp: , Rfl:     conjugated estrogens (PREMARIN) vaginal cream, Apply a pea size amount of the estrogen cream to the opening of the vagina three nights per week , Disp: , Rfl:     DULoxetine (CYMBALTA) 20 mg capsule, Take 1 capsule (20 mg total) by mouth daily, Disp: 30 capsule, Rfl: 3    gabapentin (NEURONTIN) 100 mg capsule, Take 3 PO HS , Disp: 270 capsule, Rfl: 0    HYDROcodone-acetaminophen (NORCO) 5-325 mg per tablet, 1/2 to 1 tab p o  T i d  P r n  Pain (Patient taking differently: 1 tab p o  T i d  P r n  Pain), Disp: 45 tablet, Rfl: 0    Multiple Vitamins-Minerals (OCUVITE EXTRA) TABS, Take 1 tablet by mouth daily, Disp: , Rfl:     Omega-3 1000 MG CAPS, Take by mouth, Disp: , Rfl:     polyethylene glycol (GLYCOLAX) powder, Take 17 g by mouth daily, Disp: , Rfl:     potassium chloride (K-DUR,KLOR-CON) 20 mEq tablet, Take 1 tablet (20 mEq total) by mouth 2 (two) times a day, Disp: 60 tablet, Rfl: 11    senna-docusate sodium (SENOKOT S) 8 6-50 mg per tablet, Take 1 tablet by mouth daily, Disp: 9 tablet, Rfl: 0    torsemide (DEMADEX) 20 mg tablet, Take 1 tablet (20 mg total) by mouth daily (Patient taking differently: Take 20 mg by mouth daily as needed ), Disp: 30 tablet, Rfl: 11  Allergies   Allergen Reactions    Heparin      Annotation - [id number]: LOW PLATELETS    Phenazopyridine      As per daughter "Not appropriate for pt due to heart condition"       Review of Systems:  Review of Systems    There were no vitals filed for this visit  Imaging:Mri Abdomen W Wo Contrast And Mrcp    Result Date: 8/4/2020  Narrative: MRI OF THE ABDOMEN WITH AND WITHOUT CONTRAST WITH MRCP INDICATION:  Dr Blaze Browning note from 6/8/2020 was reviewed    At that time, patient is one month status post completion of 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  COMPARISON: Abdomen MR from 3/20/2020  TECHNIQUE:  The following pulse sequences were obtained:  Coronal and axial T2 with TE of 90 and 180 respectively, axial T2 with fat saturation, axial FIESTA fat-sat, axial T1-weighted in-and-out-of phase, axial DWI/ADC, pre-contrast axial T1 with fat saturation, post-contrast dynamic axial T1 with fat saturation at 20, 70, and 180 seconds, followed by coronal and 7 minute delayed axial T1 with fat saturation  3D MRCP images were obtained with radial thick slabs and projections  3D rendering was performed from the acquisition scanner  IV Contrast:  5 mL of Gadobutrol injection (SINGLE-DOSE) FINDINGS: LOWER CHEST:   Unremarkable  LIVER: Normal in size and configuration  Again seen is a left hepatic lobe lesion, predominantly within segment 4, which is hypointense on T1-weighted sequences, mildly hyperintense on T2-weighted sequences, demonstrating gradual delayed homogeneous enhancement, consistent with cholangiocarcinoma  It has increased in size compared to prior, currently measuring 3 7 x 3 2 cm, previously 3 2 x 2 5 cm (compare series 10 image 71 with series 11 image 39 of the prior study ) There is unchanged intrahepatic biliary ductal dilatation in segment 2/3 due to this mass  There is also a new 0 9 x 0 9 cm right hepatic lobe segment 7 lesion, hypointense on T1 weighted sequences, mildly T2 hyperintense, demonstrating gradual enhancement, consistent with a metastasis (series 10 image 56 ) Lesion also demonstrates restricted diffusion in segment 7 (series 9 image 96 ) The hepatic veins and portal veins are patent  BILE DUCTS:  See above regarding the dilated segment 2/3 ducts  Rest of the biliary tree is nondilated  GALLBLADDER:  Multiple gallstones in the gallbladder  PANCREAS:  Normal  No main pancreatic ductal dilation  ADRENAL GLANDS:  Normal  SPLEEN:  Normal  KIDNEYS/PROXIMAL URETERS:  No hydroureteronephrosis  No suspicious renal mass  BOWEL:   No dilated loops of bowel  PERITONEUM/RETROPERITONEUM:  No ascites  LYMPH NODES:  No abdominal lymphadenopathy  VASCULAR STRUCTURES:  No aneurysm  ABDOMINAL WALL:  Unremarkable  OSSEOUS STRUCTURES:  No suspicious osseous lesion  Impression: In this patient with a hepatic cholangiocarcinoma status post radiation therapy, compared to 3/20/2020 MRI, the primary tumor located predominantly in segment 4 has increased in size currently measuring 3 7 x 3 2 cm, previously 3 2 x 2 5 cm (compare series 10 image 71 with series 11 image 39 of the prior study ) There is unchanged intrahepatic biliary ductal dilatation in segment 2/3 due to this mass  There is also a new 0 9 x 0 9 cm right hepatic lobe segment 7 metastasis (series 10 image 56 ) The study was marked in EPIC for significant notification   Workstation performed: [id number]       Teaching

## 2020-08-24 DIAGNOSIS — C22.9 ADENOCARCINOMA DETERMINED BY BIOPSY OF LIVER (HCC): Primary | ICD-10-CM

## 2020-08-25 ENCOUNTER — TRANSCRIBE ORDERS (OUTPATIENT)
Dept: ADMINISTRATIVE | Facility: HOSPITAL | Age: 85
End: 2020-08-25

## 2020-08-25 DIAGNOSIS — C22.1 CANCER OF INTRAHEPATIC BILE DUCTS (HCC): Primary | ICD-10-CM

## 2020-09-03 ENCOUNTER — TELEMEDICINE (OUTPATIENT)
Dept: INTERVENTIONAL RADIOLOGY/VASCULAR | Facility: CLINIC | Age: 85
End: 2020-09-03
Payer: MEDICARE

## 2020-09-03 DIAGNOSIS — C22.9 ADENOCARCINOMA DETERMINED BY BIOPSY OF LIVER (HCC): Primary | ICD-10-CM

## 2020-09-03 PROCEDURE — 99213 OFFICE O/P EST LOW 20 MIN: CPT | Performed by: RADIOLOGY

## 2020-09-04 NOTE — PROGRESS NOTES
Virtual Regular Visit      Assessment/Plan:  Ms Santosh Pelayo is a pleasant 80-year-old female with refractory cholangiocarcinoma over left hepatic lobe in progressive disease within her right hepatic lobe  She was recently treated with stereotactic radiation  She is no longer a candidate for radiation therapy or Y90 to her left hepatic lobe  She is seen in interventional radiology clinic to discuss possible options including TACE and percutaneous ablation  I reviewed her MRI imaging which was performed on 7/31/2020 with her and her daughter  I described the procedure details of TACE and that I felt we could offer treatment to her left hepatic lobe  I explained that this is not curative, only palliative  There is data to support its use and has been shown to prolong survival   I also described possible options for treatment of her right hepatic lobe lesion including percutaneous ablation and TACE  Ablation of the right hepatic lobe lesion is amenable to ablation, its location in segment 7 would make it technically difficult also noting that it is juxtaposed to a large right hepatic vein  I felt that if she were agreeable to TACE, it would make sense to just try to treat this smaller lesion at the same time with TACE  I also stated given her age, trying to treat both regions in one procedure makes sense  I did state that the procedure does carry risk and that she should seek opinion with Medical Oncology as well for additional therapy  I answered her and her daughter's questions to their apparent satisfaction  They will reach out to IR if they wish to pursue treatment        Problem List Items Addressed This Visit        Digestive    Adenocarcinoma determined by biopsy of liver ([facility] [address] ) - Primary               Reason for visit is   Chief Complaint   Patient presents with    Virtual Regular Visit        Encounter provider Isidoro Phelps DO    Provider located at [address] [facility] [place]   [phone]      Recent Visits  No visits were found meeting these conditions  Showing recent visits within past 7 days and meeting all other requirements     Future Appointments  No visits were found meeting these conditions  Showing future appointments within next 150 days and meeting all other requirements        The patient was identified by name and date of birth  Maximus Kent was informed that this is a telemedicine visit and that the visit is being conducted through [employer] and patient was informed that this is not a secure, HIPAA-complaint platform  She agrees to proceed     My office door was closed  No one else was in the room  She acknowledged consent and understanding of privacy and security of the video platform  The patient has agreed to participate and understands they can discontinue the visit at any time  Patient is aware this is a billable service  Subjective  Maximus Kent is a 80 y o  female cholangiocarcinoma  HPI:  Ms Yanni Merino is a pleasant 25-year-old female with refractory/progressive cholangiocarcinoma involving her left hepatic lobe status post stereotactic radiation approximately 3 months ago  Unfortunately, recent MRI on 7/31/2020 demonstrated progression with a 1 cm lesion in segment 7  She is referred to IR clinic to discuss possible treatment options by Dr Maria A Morrow  Overall, she currently feels well  She denies confusion, nausea, vomiting, jaundice, weight loss or change in bowel habits  Her most significant complaint relates to musculoskeletal/sciatic pain        Past Medical History:   Diagnosis Date    Acute myocardial infarction [facility])     Aortic valve disease     Arthritis     Atrial fibrillation ([facility] [address] )     CAD (coronary artery disease)     Cancer (HCC)     skin cancer removed from left leg    Cellulitis      AND ABSCESS    Cervical stenosis of spine     CHF (congestive heart failure) ([facility] [address] )     Cholecystitis     Closed Colles' fracture     OF THE LEFT WRIST; LAST ASSESSED: 5/2/14    Closed fracture of radius     LAST ASSESSED: 2/19/14    Diverticulosis     Dyspnea     Hair loss disorder     Hematuria     History of being hospitalized     1/5/12-1/14/12 [facility] PROCEDURES: 1) REDO STERNOTOMY AND AORTIC VALVE REPLACEMENT WITH A 19-MM REBA-PETERS BOVINIE PERICARDIAL MAGNA EASE VALVE 2) TRANSESOPHAGEAL ECHOCARDIOGRAM     Hypercholesterolemia     Hypertension     Incomplete bladder emptying     Leg wound, left     Liver cancer (HCC)     Malaise and fatigue     Renal disorder     Sick sinus syndrome (HCC)     Urinary tract infection        Past Surgical History:   Procedure Laterality Date    AORTIC VALVE REPLACEMENT  01/2012    HEART VALVE    CARDIAC SURGERY      CATARACT EXTRACTION W/  INTRAOCULAR LENS IMPLANT  08/26/2010    Bebeto Alejandre MD; PHACOEMULISIFICATION; INSERTION INTRAOCULAR LENS OD    COLONOSCOPY  03/08/2007    CORONARY ARTERY BYPASS GRAFT  01/2005    IR BIOPSY LIVER MASS  3/25/2020    LEG SURGERY      LA KNEE SCOPE,MED/LAT MENISECTOMY Left 9/23/2019    Procedure: ARTHROSCOPY KNEE, LEFT PARTIAL MEDIAL MENISCECTOMY;  Surgeon: Yamile Mcdaniels MD;  Location: [facility] OR;  Service: Orthopedics       Current Outpatient Medications   Medication Sig Dispense Refill    amLODIPine (NORVASC) 2 5 mg tablet Take 1 tablet (2 5 mg total) by mouth daily 90 tablet 3    apixaban (Eliquis) 5 mg Take 1 tablet (5 mg total) by mouth 2 (two) times a day 180 tablet 3    atorvastatin (LIPITOR) 20 mg tablet Take 1 tablet (20 mg total) by mouth every evening 90 tablet 3    clotrimazole-betamethasone (LOTRISONE) 1-0 05 % cream Apply topically 2 (two) times a day 30 g 3    Coenzyme Q10 (COQ10) 100 MG CAPS Take 1 capsule by mouth Daily      conjugated estrogens (PREMARIN) vaginal cream Apply a pea size amount of the estrogen cream to the opening of the vagina three nights per week       DULoxetine (CYMBALTA) 20 mg capsule Take 1 capsule (20 mg total) by mouth daily 30 capsule 3    gabapentin (NEURONTIN) 100 mg capsule Take 3 PO HS  270 capsule 0    HYDROcodone-acetaminophen (NORCO) 5-325 mg per tablet 1/2 to 1 tab p o  T i d  P r n  Pain (Patient taking differently: 1 tab p o  T i d  P r n  Pain) 45 tablet 0    Multiple Vitamins-Minerals (OCUVITE EXTRA) TABS Take 1 tablet by mouth daily      Omega-3 1000 MG CAPS Take by mouth      polyethylene glycol (GLYCOLAX) powder Take 17 g by mouth daily      potassium chloride (K-DUR,KLOR-CON) 20 mEq tablet Take 1 tablet (20 mEq total) by mouth 2 (two) times a day 60 tablet 11    senna-docusate sodium (SENOKOT S) 8 6-50 mg per tablet Take 1 tablet by mouth daily 9 tablet 0    torsemide (DEMADEX) 20 mg tablet Take 1 tablet (20 mg total) by mouth daily (Patient taking differently: Take 20 mg by mouth daily as needed ) 30 tablet 11     No current facility-administered medications for this visit  Allergies   Allergen Reactions    Heparin      Annotation - [id number]: LOW PLATELETS    Phenazopyridine      As per daughter "Not appropriate for pt due to heart condition"       Review of Systems   Constitutional: Positive for activity change (related to musculoskeletal pain)  HENT: Negative  Eyes: Negative  Respiratory: Negative  Cardiovascular: Negative  Gastrointestinal: Negative  Endocrine: Negative  Genitourinary: Negative  Musculoskeletal: Negative  Neurological: Negative  Hematological: Negative  Psychiatric/Behavioral: Negative  Video Exam    There were no vitals filed for this visit  Physical Exam  Constitutional:       Appearance: Normal appearance  She is normal weight  HENT:      Head: Normocephalic and atraumatic  Eyes:      Extraocular Movements: Extraocular movements intact  Conjunctiva/sclera: Conjunctivae normal       Pupils: Pupils are equal, round, and reactive to light  Pulmonary:      Effort: Pulmonary effort is normal    Skin:     Coloration: Skin is not jaundiced  Neurological:      Mental Status: She is alert and oriented to person, place, and time  Psychiatric:         Mood and Affect: Mood normal          Behavior: Behavior normal           I spent 30 minutes with patient today in which greater than 50% of the time was spent in counseling/coordination of care regarding management / treatment of her cholangiocarcinoma  VIRTUAL VISIT DISCLAIMER    Tishia Viviana acknowledges that she has consented to an online visit or consultation  She understands that the online visit is based solely on information provided by her, and that, in the absence of a face-to-face physical evaluation by the physician, the diagnosis she receives is both limited and provisional in terms of accuracy and completeness  This is not intended to replace a full medical face-to-face evaluation by the physician  Krissy Michelle understands and accepts these terms

## 2020-09-08 ENCOUNTER — TELEPHONE (OUTPATIENT)
Dept: HEMATOLOGY ONCOLOGY | Facility: CLINIC | Age: 85
End: 2020-09-08

## 2020-09-09 ENCOUNTER — CONSULT (OUTPATIENT)
Dept: HEMATOLOGY ONCOLOGY | Facility: HOSPITAL | Age: 85
End: 2020-09-09
Payer: MEDICARE

## 2020-09-09 VITALS
BODY MASS INDEX: 21.26 KG/M2 | HEIGHT: 63 IN | DIASTOLIC BLOOD PRESSURE: 82 MMHG | RESPIRATION RATE: 16 BRPM | SYSTOLIC BLOOD PRESSURE: 116 MMHG | TEMPERATURE: 97.6 F | OXYGEN SATURATION: 95 % | HEART RATE: 79 BPM | WEIGHT: 120 LBS

## 2020-09-09 DIAGNOSIS — C22.9 ADENOCARCINOMA DETERMINED BY BIOPSY OF LIVER (HCC): Primary | ICD-10-CM

## 2020-09-09 PROCEDURE — 99205 OFFICE O/P NEW HI 60 MIN: CPT | Performed by: INTERNAL MEDICINE

## 2020-09-09 NOTE — PROGRESS NOTES
Oncology Outpatient Consult Note  Maximus Kent 80 y o  female MRN: @ Encounter: [id number]        Date:  9/9/2020        CC:  Cholangiocarcinoma      HPI:  Maximus Kent is seen for initial consultation 9/9/2020 regarding cholangiocarcinoma diagnosed in early 2020  The patient apparently has stereotactic radiation and was followed  She then had progression of disease in the form of growth of her treated lesion along with another small lesion on MRI  Her case was presented at tumor board and it was decided to proceed with TACE and this has been offered  She wished to see Oncology to discuss systemic options  I went over various options including gemcitabine based chemotherapy versus targeted therapy if she has an FGFR 2 mutation  The patient is hesitant to do this unless she has true metastatic disease or further progression after TACE which I think is very reasonable  As far symptoms are concerned she is asymptomatic  Denies any nausea denies any vomiting denies any diarrhea  Does have sciatic back pain for which she is getting treatment  The rest of her 14 point review of systems today was negative  Previous Hematologic/ Oncologic History:    Oncology History   8/20/2020 Presented presented at the Rectal/GI Multidisciplinary Conference    Diagnosis: Liver Adenocarcinoma     PHYSICIAN RECOMMENDED PLAN:     -Liver directed therapy with embolization and percutaneous ablation   -Consult Medical Oncology if patient would like to   -Dr Maria A Morrow will discuss the above with patient and if she would like to consult with Medical Oncology, will help set up          [facility] )   3/2020 Initial Diagnosis    Adenocarcinoma [facility] by biopsy of liver ([facility] )     3/25/2020 Biopsy    Liver, Mass, Biopsy:  -  Adenocarcinoma, well- differentiated      Note:  Tumor cells are  positive for CK7, CK19 and negative for arginase,  CDX2, CK20  TTF-1, Napsin     Based on this staining pattern , the primary site is favored to be from Cholangiocarcinoma, pancreaticobiliary region or upper GI     4/28/2020 - 5/8/2020 Radiation    Course: C1 SBRT Liver    Plan ID Energy Fractions Dose per Fraction (cGy) Dose Correction (cGy) Total Dose Delivered (cGy) Elapsed Days   SBRT Liver 6X 5 / 5 700 0 3,500 10      Treatment Dates:  4/28/2020 - 5/8/2020   (Dr Destinee Lozoya)             Test Results:    Imaging: No results found  Labs:   Lab Results   Component Value Date    WBC 6 67 05/04/2020    HGB 10 6 (L) 05/04/2020    HCT 34 4 (L) 05/04/2020    MCV 89 05/04/2020     05/04/2020     Lab Results   Component Value Date     08/04/2015    K 3 8 05/21/2020     05/21/2020    CO2 31 05/21/2020    ANIONGAP 11 08/04/2015    BUN 20 07/31/2020    CREATININE 1 00 07/31/2020    GLUCOSE 92 08/04/2015    GLUF 83 05/21/2020    CALCIUM 9 8 05/21/2020    AST 21 05/04/2020    ALT 21 05/04/2020    ALKPHOS 135 (H) 05/04/2020    PROT 6 9 08/04/2015    BILITOT 0 53 08/04/2015    EGFR 49 07/31/2020     Lab Results   Component Value Date    CEA 6 9 (H) 03/20/2020       ROS: As stated in history of present illness otherwise her 14 point review of systems today was negative      Active Problems:   Patient Active Problem List   Diagnosis    Hx of CABG    Hypertensive 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 and stage 1 through stage 4 chronic kidney disease, or chronic kidney disease (HCC)    Hyperlipidemia    Paroxysmal atrial fibrillation (HCC)    History of aortic valve replacement    Acute cystitis with hematuria    Arteriosclerosis of carotid artery    Cervical spine arthritis    Chronic cervical radiculopathy    Dystrophy of vulva    Mixed stress and urge urinary incontinence    Neural foraminal stenosis of cervical spine    Occipital neuralgia of right side    Osteoarthritis    Peripheral polyneuropathy    Bradycardia    Gastroesophageal reflux disease with esophagitis    Post herpetic neuralgia    Lower extremity edema    Pes anserinus bursitis of left knee    CKD (chronic kidney disease) stage 3, GFR 30-59 ml/min (HCC)    Sick sinus syndrome (HCC)    Abnormal CT of the abdomen    Hepatic lesion    Chronic diastolic congestive heart failure (HCC)    Anemia    Adenocarcinoma determined by biopsy of liver ([address] )    Coronary artery disease involving native coronary artery of native heart without angina pectoris    Lumbar foraminal stenosis    Lumbar radiculopathy    Synovial cyst of lumbar facet joint    Compression fracture of body of thoracic vertebra (HCC)    Other idiopathic scoliosis, thoracolumbar region    Spondylolisthesis of lumbar region       Past Medical History:   Past Medical History:   Diagnosis Date    Acute myocardial infarction ([address] )     Aortic valve disease     Arthritis     Atrial fibrillation ([address] )     CAD (coronary artery disease)     Cancer ([facility] [address] )     skin cancer removed from left leg    Cellulitis      AND ABSCESS    Cervical stenosis of spine     CHF (congestive heart failure) ([facility])     Cholecystitis     Closed Colles' fracture     OF THE LEFT WRIST; LAST ASSESSED: 5/2/14    Closed fracture of radius     LAST ASSESSED: 2/19/14    Diverticulosis     Dyspnea     Hair loss disorder     Hematuria     History of being hospitalized     1/5/12-1/14/12 [facility] PROCEDURES: 1) REDO STERNOTOMY AND AORTIC VALVE REPLACEMENT WITH A 19-MM REBA-PETERS BOVINIE PERICARDIAL MAGNA EASE VALVE 2) TRANSESOPHAGEAL ECHOCARDIOGRAM     Hypercholesterolemia     Hypertension     Incomplete bladder emptying     Leg wound, left     Liver cancer (HCC)     Malaise and fatigue     Renal disorder     Sick sinus syndrome ([address] )     Urinary tract infection        Surgical History:   Past Surgical History:   Procedure Laterality Date    AORTIC VALVE REPLACEMENT  01/2012    HEART VALVE    CARDIAC SURGERY      CATARACT EXTRACTION W/  INTRAOCULAR LENS IMPLANT  08/26/2010    Miguel Herrera MD; PHACOEMULISIFICATION; INSERTION INTRAOCULAR LENS OD    COLONOSCOPY  03/08/2007    CORONARY ARTERY BYPASS GRAFT  01/2005    IR BIOPSY LIVER MASS  3/25/2020    LEG SURGERY      DE KNEE SCOPE,MED/LAT MENISECTOMY Left 9/23/2019    Procedure: ARTHROSCOPY KNEE, LEFT PARTIAL MEDIAL MENISCECTOMY;  Surgeon: Christy Munoz MD;  Location:  MAIN OR;  Service: Orthopedics       Family History:    Family History   Problem Relation Age of Onset    Diabetes Mother         MELLITUS    Hypertension Mother     Heart disease Mother     Prostate cancer Father     Dementia Brother     Heart disease Maternal Grandmother     Hypertension Maternal Grandmother     Heart disease Maternal Grandfather     Hypertension Family     Osteoporosis Family     Substance Abuse Neg Hx     Mental illness Neg Hx        Cancer-related family history includes Prostate cancer in her father  Social History:   Social History     Socioeconomic History    Marital status:       Spouse name: Not on file    Number of children: Not on file    Years of education: Not on file    Highest education level: Not on file   Occupational History    Occupation: RETIRED   Social Needs    Financial resource strain: Not on file    Food insecurity     Worry: Not on file     Inability: Not on file    Transportation needs     Medical: No     Non-medical: No   Tobacco Use    Smoking status: Former Smoker    Smokeless tobacco: Never Used    Tobacco comment: QUIT 40 YEARS AGO ALSO NEVER A SMOKER AS PER [employer]   Substance and Sexual Activity    Alcohol use: Not Currently     Frequency: Never     Comment: social    Drug use: No    Sexual activity: Not Currently   Lifestyle    Physical activity     Days per week: 0 days     Minutes per session: 0 min    Stress: Rather much   Relationships    Social connections     Talks on phone: Not on file     Gets together: Not on file     Attends Advent service: Not on file     Active member of club or organization: Not on file     Attends meetings of clubs or organizations: Not on file     Relationship status: Not on file    Intimate partner violence     Fear of current or ex partner: No     Emotionally abused: No     Physically abused: No     Forced sexual activity: No   Other Topics Concern    Not on file   Social History Narrative    Lives with son  Feels safe  Has living will  ACTIVE ADVANCE DIRECTIVE    CAFFEINE USE: 1 1/2 CUPS COFFEE QD    EXERCISE HABITS    GOOD DENTAL HYGIENE    LIVING SITUATION: SUPPORTIVE AND SAFE       Current Medications:   Current Outpatient Medications   Medication Sig Dispense Refill    amLODIPine (NORVASC) 2 5 mg tablet Take 1 tablet (2 5 mg total) by mouth daily 90 tablet 3    apixaban (Eliquis) 5 mg Take 1 tablet (5 mg total) by mouth 2 (two) times a day 180 tablet 3    atorvastatin (LIPITOR) 20 mg tablet Take 1 tablet (20 mg total) by mouth every evening 90 tablet 3    clotrimazole-betamethasone (LOTRISONE) 1-0 05 % cream Apply topically 2 (two) times a day 30 g 3    Coenzyme Q10 (COQ10) 100 MG CAPS Take 1 capsule by mouth Daily      conjugated estrogens (PREMARIN) vaginal cream Apply a pea size amount of the estrogen cream to the opening of the vagina three nights per week   DULoxetine (CYMBALTA) 20 mg capsule Take 1 capsule (20 mg total) by mouth daily 30 capsule 3    gabapentin (NEURONTIN) 100 mg capsule Take 3 PO HS  270 capsule 0    HYDROcodone-acetaminophen (NORCO) 5-325 mg per tablet 1/2 to 1 tab p o  T i d  P r n  Pain (Patient taking differently: 1 tab p o  T i d  P r n   Pain) 45 tablet 0    Multiple Vitamins-Minerals (OCUVITE EXTRA) TABS Take 1 tablet by mouth daily      Omega-3 1000 MG CAPS Take by mouth      polyethylene glycol (GLYCOLAX) powder Take 17 g by mouth daily      potassium chloride (K-DUR,KLOR-CON) 20 mEq tablet Take 1 tablet (20 mEq total) by mouth 2 (two) times a day 60 tablet 11    senna-docusate sodium (SENOKOT S) 8 6-50 mg per tablet Take 1 tablet by mouth daily 9 tablet 0    torsemide (DEMADEX) 20 mg tablet Take 1 tablet (20 mg total) by mouth daily (Patient taking differently: Take 20 mg by mouth daily as needed ) 30 tablet 11    mupirocin (BACTROBAN) 2 % ointment        No current facility-administered medications for this visit  Allergies: Allergies   Allergen Reactions    Heparin      Annotation - [id number]: LOW PLATELETS    Phenazopyridine      As per daughter "Not appropriate for pt due to heart condition"         Physical Exam:    Body surface area is 1 56 meters squared  Wt Readings from Last 3 Encounters:   09/09/20 54 4 kg (120 lb)   08/11/20 52 2 kg (115 lb)   07/20/20 51 3 kg (113 lb)        Temp Readings from Last 3 Encounters:   09/09/20 97 6 °F (36 4 °C) (Tympanic)   08/11/20 98 9 °F (37 2 °C) (Tympanic)   07/20/20 98 9 °F (37 2 °C) (Tympanic)        BP Readings from Last 3 Encounters:   09/09/20 116/82   08/11/20 142/68   07/20/20 136/78         Pulse Readings from Last 3 Encounters:   09/09/20 79   08/11/20 74   07/20/20 75     @LASTSAO2(3)@    Physical Exam     Constitutional   General appearance: No acute distress, well appearing and well nourished  Eyes   Conjunctiva and lids: No swelling, erythema or discharge  Pupils and irises: Equal, round and reactive to light  Ears, Nose, Mouth, and Throat   External inspection of ears and nose: Normal     Nasal mucosa, septum, and turbinates: Normal without edema or erythema  Oropharynx: Normal with no erythema, edema, exudate or lesions  Pulmonary   Respiratory effort: No increased work of breathing or signs of respiratory distress  Auscultation of lungs: Clear to auscultation  Cardiovascular   Palpation of heart: Normal PMI, no thrills  Auscultation of heart: Normal rate and rhythm, normal S1 and S2, without murmurs      Examination of extremities for edema and/or varicosities: Normal     Carotid pulses: Normal     Abdomen   Abdomen: Non-tender, no masses  Liver and spleen: No hepatomegaly or splenomegaly  Lymphatic   Palpation of lymph nodes in neck: No lymphadenopathy  Musculoskeletal   Gait and station: Normal     Digits and nails: Normal without clubbing or cyanosis  Inspection/palpation of joints, bones, and muscles: Normal     Skin   Skin and subcutaneous tissue: Normal without rashes or lesions  Neurologic   Cranial nerves: Cranial nerves 2-12 intact  Sensation: No sensory loss  Psychiatric   Orientation to person, place, and time: Normal     Mood and affect: Normal           Assessment/ Plan: This is a pleasant 20-year-old female who was diagnosed with localized cholangiocarcinoma few months ago  The patient is stereotactic radiation  Her tumor has now grown  Options include systemic chemotherapy +/-local treatment  At 80years of age I would recommend she get the embolization procedure done and then have imaging repeated in a few months  If she has progression or metastatic disease at that point she can decide about systemic therapy  I will send off her Caris testing to see if she has an FGFR 2 mutation  If so she may be a candidate for targeted therapy down the road  The patient and her daughter agreed with this  She will get the embolization procedure done and then see me back in 4 months with repeat imaging  Until then if she has any questions she will call our office  Goals and Barriers:  Current Goal:  Prolong Survival from cholangiocarcinoma   Barriers: None  Patient's Capacity to Self Care:  Patient able to self care      Portions of the record may have been created with voice recognition software   Occasional wrong word or "sound a like" substitutions may have occurred due to the inherent limitations of voice recognition software   Read the chart carefully and recognize, using context, where substitutions have occurred

## 2020-09-11 ENCOUNTER — TELEPHONE (OUTPATIENT)
Dept: INFUSION CENTER | Facility: HOSPITAL | Age: 85
End: 2020-09-11

## 2020-09-11 NOTE — TELEPHONE ENCOUNTER
MSW received a Distress Thermometer from med Onc, where the pt self scored a 5 to indicate his level of stress  The pt did not sary off any psychosocial or physical problem areas  As per the chart notes, MSW reached out to the pt's daughter Davina Biggs  Daughter reports that her mother has been coping well and is realistic with learning that her cancer has returned  She states they were not surprised as her mother was experiencing pain that was indicative that the cancer was most likely presenting again  She did share that her mother felt the pain was from her tumor but has been told that her pain is from her sciatica  The pt's daughter shared that her mother has been frustrated as she is not certain she agrees with that  MSW has encouraged her ot discuss this with the physician and she reports they will be talking to their PCP  MSW explained the role of the Cancer Counselor and provided a contact number, encouraging them to call as needed  Pt's daughter states they have no needs at this time but was appreciative of the number and the call

## 2020-09-16 ENCOUNTER — TELEPHONE (OUTPATIENT)
Dept: HEMATOLOGY ONCOLOGY | Facility: MEDICAL CENTER | Age: 85
End: 2020-09-16

## 2020-09-25 ENCOUNTER — TELEPHONE (OUTPATIENT)
Dept: GYNECOLOGIC ONCOLOGY | Facility: CLINIC | Age: 85
End: 2020-09-25

## 2020-09-25 NOTE — TELEPHONE ENCOUNTER
Returned phone call to patients daughter  I left a message for her to call back with any questions but the PET scan does not change Dr Eusebia Benoit of care  Any questions in regards to the PET and or plan will need to be discussed with the ordering physician Dr Lopez Neigh

## 2020-09-25 NOTE — TELEPHONE ENCOUNTER
Patients daughter Diamond Levine called asking for Dr Jennifer Nolan to please review the PET scan done at [place] on 9/16/20 and call her to discuss his opinion in regards to finding and plan of care  Results are scanned into chart  If it should remain as he suggested prior to PET or if there should be any changes  Please call her at # [phone] to discuss   Thank you

## 2020-09-27 DIAGNOSIS — I48.0 PAF (PAROXYSMAL ATRIAL FIBRILLATION) (HCC): ICD-10-CM

## 2020-09-27 DIAGNOSIS — Z79.01 CHRONIC ANTICOAGULATION: ICD-10-CM

## 2020-09-27 RX ORDER — APIXABAN 5 MG/1
TABLET, FILM COATED ORAL
Qty: 180 TABLET | Refills: 1 | Status: SHIPPED | OUTPATIENT
Start: 2020-09-27

## 2020-10-06 ENCOUNTER — TELEPHONE (OUTPATIENT)
Dept: INTERVENTIONAL RADIOLOGY/VASCULAR | Facility: CLINIC | Age: 85
End: 2020-10-06

## 2020-10-15 ENCOUNTER — LAB (OUTPATIENT)
Dept: LAB | Facility: HOSPITAL | Age: 85
End: 2020-10-15
Attending: INTERNAL MEDICINE
Payer: MEDICARE

## 2020-10-15 DIAGNOSIS — C22.9 ADENOCARCINOMA DETERMINED BY BIOPSY OF LIVER (HCC): ICD-10-CM

## 2020-10-15 DIAGNOSIS — I48.0 PAROXYSMAL ATRIAL FIBRILLATION (HCC): Chronic | ICD-10-CM

## 2020-10-15 DIAGNOSIS — I25.10 CORONARY ARTERY DISEASE INVOLVING NATIVE CORONARY ARTERY OF NATIVE HEART WITHOUT ANGINA PECTORIS: ICD-10-CM

## 2020-10-15 LAB
ALBUMIN SERPL BCP-MCNC: 3.4 G/DL (ref 3.5–5)
ALP SERPL-CCNC: 95 U/L (ref 46–116)
ALT SERPL W P-5'-P-CCNC: 15 U/L (ref 12–78)
ANION GAP SERPL CALCULATED.3IONS-SCNC: 7 MMOL/L (ref 4–13)
AST SERPL W P-5'-P-CCNC: 15 U/L (ref 5–45)
BASOPHILS # BLD AUTO: 0.02 THOUSANDS/ΜL (ref 0–0.1)
BASOPHILS NFR BLD AUTO: 0 % (ref 0–1)
BILIRUB SERPL-MCNC: 0.4 MG/DL (ref 0.2–1)
BUN SERPL-MCNC: 17 MG/DL (ref 5–25)
CALCIUM ALBUM COR SERPL-MCNC: 9.3 MG/DL (ref 8.3–10.1)
CALCIUM SERPL-MCNC: 8.8 MG/DL (ref 8.3–10.1)
CHLORIDE SERPL-SCNC: 105 MMOL/L (ref 100–108)
CO2 SERPL-SCNC: 29 MMOL/L (ref 21–32)
CREAT SERPL-MCNC: 1.03 MG/DL (ref 0.6–1.3)
EOSINOPHIL # BLD AUTO: 0.07 THOUSAND/ΜL (ref 0–0.61)
EOSINOPHIL NFR BLD AUTO: 1 % (ref 0–6)
ERYTHROCYTE [DISTWIDTH] IN BLOOD BY AUTOMATED COUNT: 15.4 % (ref 11.6–15.1)
GFR SERPL CREATININE-BSD FRML MDRD: 48 ML/MIN/1.73SQ M
GLUCOSE P FAST SERPL-MCNC: 80 MG/DL (ref 65–99)
HCT VFR BLD AUTO: 33.9 % (ref 34.8–46.1)
HGB BLD-MCNC: 10.7 G/DL (ref 11.5–15.4)
IMM GRANULOCYTES # BLD AUTO: 0.01 THOUSAND/UL (ref 0–0.2)
IMM GRANULOCYTES NFR BLD AUTO: 0 % (ref 0–2)
LYMPHOCYTES # BLD AUTO: 1.18 THOUSANDS/ΜL (ref 0.6–4.47)
LYMPHOCYTES NFR BLD AUTO: 22 % (ref 14–44)
MCH RBC QN AUTO: 28.4 PG (ref 26.8–34.3)
MCHC RBC AUTO-ENTMCNC: 31.6 G/DL (ref 31.4–37.4)
MCV RBC AUTO: 90 FL (ref 82–98)
MONOCYTES # BLD AUTO: 0.78 THOUSAND/ΜL (ref 0.17–1.22)
MONOCYTES NFR BLD AUTO: 15 % (ref 4–12)
NEUTROPHILS # BLD AUTO: 3.31 THOUSANDS/ΜL (ref 1.85–7.62)
NEUTS SEG NFR BLD AUTO: 62 % (ref 43–75)
NRBC BLD AUTO-RTO: 0 /100 WBCS
PLATELET # BLD AUTO: 184 THOUSANDS/UL (ref 149–390)
PMV BLD AUTO: 11.1 FL (ref 8.9–12.7)
POTASSIUM SERPL-SCNC: 3.7 MMOL/L (ref 3.5–5.3)
PROT SERPL-MCNC: 6.5 G/DL (ref 6.4–8.2)
RBC # BLD AUTO: 3.77 MILLION/UL (ref 3.81–5.12)
SODIUM SERPL-SCNC: 141 MMOL/L (ref 136–145)
WBC # BLD AUTO: 5.37 THOUSAND/UL (ref 4.31–10.16)

## 2020-10-15 PROCEDURE — 36415 COLL VENOUS BLD VENIPUNCTURE: CPT

## 2020-10-15 PROCEDURE — 80053 COMPREHEN METABOLIC PANEL: CPT

## 2020-10-15 PROCEDURE — 85025 COMPLETE CBC W/AUTO DIFF WBC: CPT

## 2020-10-16 ENCOUNTER — OFFICE VISIT (OUTPATIENT)
Dept: FAMILY MEDICINE CLINIC | Facility: HOSPITAL | Age: 85
End: 2020-10-16
Payer: MEDICARE

## 2020-10-16 VITALS
WEIGHT: 119 LBS | HEART RATE: 76 BPM | SYSTOLIC BLOOD PRESSURE: 130 MMHG | TEMPERATURE: 97.9 F | OXYGEN SATURATION: 96 % | DIASTOLIC BLOOD PRESSURE: 76 MMHG | HEIGHT: 63 IN | BODY MASS INDEX: 21.09 KG/M2

## 2020-10-16 DIAGNOSIS — I48.0 PAROXYSMAL ATRIAL FIBRILLATION (HCC): Chronic | ICD-10-CM

## 2020-10-16 DIAGNOSIS — M48.061 LUMBAR FORAMINAL STENOSIS: Primary | ICD-10-CM

## 2020-10-16 DIAGNOSIS — I25.10 CORONARY ARTERY DISEASE INVOLVING NATIVE CORONARY ARTERY OF NATIVE HEART WITHOUT ANGINA PECTORIS: ICD-10-CM

## 2020-10-16 DIAGNOSIS — I50.32 CHRONIC DIASTOLIC CONGESTIVE HEART FAILURE (HCC): Chronic | ICD-10-CM

## 2020-10-16 DIAGNOSIS — E78.00 PURE HYPERCHOLESTEROLEMIA: Chronic | ICD-10-CM

## 2020-10-16 DIAGNOSIS — S22.000A COMPRESSION FRACTURE OF BODY OF THORACIC VERTEBRA (HCC): ICD-10-CM

## 2020-10-16 DIAGNOSIS — G62.9 PERIPHERAL POLYNEUROPATHY: ICD-10-CM

## 2020-10-16 DIAGNOSIS — M54.16 LUMBAR RADICULOPATHY: ICD-10-CM

## 2020-10-16 DIAGNOSIS — Z23 NEED FOR INFLUENZA VACCINATION: ICD-10-CM

## 2020-10-16 DIAGNOSIS — C22.9 ADENOCARCINOMA DETERMINED BY BIOPSY OF LIVER (HCC): ICD-10-CM

## 2020-10-16 PROCEDURE — G0008 ADMIN INFLUENZA VIRUS VAC: HCPCS

## 2020-10-16 PROCEDURE — 99214 OFFICE O/P EST MOD 30 MIN: CPT | Performed by: INTERNAL MEDICINE

## 2020-10-16 PROCEDURE — 90662 IIV NO PRSV INCREASED AG IM: CPT

## 2020-10-16 RX ORDER — GABAPENTIN 300 MG/1
300 CAPSULE ORAL 2 TIMES DAILY
Qty: 60 CAPSULE | Refills: 0
Start: 2020-10-16 | End: 2020-11-09 | Stop reason: SDUPTHER

## 2020-10-16 RX ORDER — OXYCODONE HYDROCHLORIDE AND ACETAMINOPHEN 5; 325 MG/1; MG/1
1 TABLET ORAL EVERY 4 HOURS PRN
COMMUNITY

## 2020-10-23 ENCOUNTER — APPOINTMENT (OUTPATIENT)
Dept: LAB | Facility: HOSPITAL | Age: 85
End: 2020-10-23
Payer: MEDICARE

## 2020-10-23 ENCOUNTER — TELEPHONE (OUTPATIENT)
Dept: FAMILY MEDICINE CLINIC | Facility: HOSPITAL | Age: 85
End: 2020-10-23

## 2020-10-23 DIAGNOSIS — R39.9 UTI SYMPTOMS: Primary | ICD-10-CM

## 2020-10-23 LAB
BACTERIA UR QL AUTO: NORMAL /HPF
BILIRUB UR QL STRIP: NEGATIVE
CLARITY UR: CLEAR
COLOR UR: YELLOW
GLUCOSE UR STRIP-MCNC: NEGATIVE MG/DL
HGB UR QL STRIP.AUTO: ABNORMAL
HYALINE CASTS #/AREA URNS LPF: NORMAL /LPF
KETONES UR STRIP-MCNC: NEGATIVE MG/DL
LEUKOCYTE ESTERASE UR QL STRIP: NEGATIVE
NITRITE UR QL STRIP: NEGATIVE
NON-SQ EPI CELLS URNS QL MICRO: NORMAL /HPF
PH UR STRIP.AUTO: 6 [PH]
PROT UR STRIP-MCNC: NEGATIVE MG/DL
RBC #/AREA URNS AUTO: NORMAL /HPF
SP GR UR STRIP.AUTO: 1.01 (ref 1–1.03)
UROBILINOGEN UR QL STRIP.AUTO: 0.2 E.U./DL
WBC #/AREA URNS AUTO: NORMAL /HPF

## 2020-10-23 PROCEDURE — 81001 URINALYSIS AUTO W/SCOPE: CPT | Performed by: PHYSICIAN ASSISTANT

## 2020-10-26 ENCOUNTER — TRANSCRIBE ORDERS (OUTPATIENT)
Dept: ADMINISTRATIVE | Facility: HOSPITAL | Age: 85
End: 2020-10-26

## 2020-10-26 ENCOUNTER — TELEPHONE (OUTPATIENT)
Dept: FAMILY MEDICINE CLINIC | Facility: HOSPITAL | Age: 85
End: 2020-10-26

## 2020-10-26 DIAGNOSIS — C22.1 CHOLANGIOCARCINOMA (HCC): Primary | ICD-10-CM

## 2020-10-27 DIAGNOSIS — R39.15 URGENCY OF URINATION: Primary | ICD-10-CM

## 2020-10-28 ENCOUNTER — HOSPITAL ENCOUNTER (OUTPATIENT)
Dept: CT IMAGING | Facility: HOSPITAL | Age: 85
Discharge: HOME | End: 2020-10-28
Payer: MEDICARE

## 2020-10-28 DIAGNOSIS — C22.1 CHOLANGIOCARCINOMA (HCC): ICD-10-CM

## 2020-10-28 PROCEDURE — 74177 CT ABD & PELVIS W/CONTRAST: CPT

## 2020-10-28 PROCEDURE — 71260 CT THORAX DX C+: CPT

## 2020-10-28 PROCEDURE — G1004 CDSM NDSC: HCPCS

## 2020-10-28 RX ADMIN — IOHEXOL 100 ML: 350 INJECTION, SOLUTION INTRAVENOUS at 09:03

## 2020-11-07 ENCOUNTER — NURSE TRIAGE (OUTPATIENT)
Dept: OTHER | Facility: OTHER | Age: 85
End: 2020-11-07

## 2020-11-08 ENCOUNTER — NURSE TRIAGE (OUTPATIENT)
Dept: OTHER | Facility: OTHER | Age: 85
End: 2020-11-08

## 2020-11-09 ENCOUNTER — TELEPHONE (OUTPATIENT)
Dept: FAMILY MEDICINE CLINIC | Facility: HOSPITAL | Age: 85
End: 2020-11-09

## 2020-11-09 DIAGNOSIS — G62.9 PERIPHERAL POLYNEUROPATHY: ICD-10-CM

## 2020-11-09 RX ORDER — GABAPENTIN 300 MG/1
300 CAPSULE ORAL 2 TIMES DAILY
Qty: 60 CAPSULE | Refills: 1 | Status: SHIPPED | OUTPATIENT
Start: 2020-11-09

## 2020-11-20 ENCOUNTER — TRANSCRIBE ORDERS (OUTPATIENT)
Dept: ADMINISTRATIVE | Facility: HOSPITAL | Age: 85
End: 2020-11-20

## 2020-11-20 ENCOUNTER — LAB (OUTPATIENT)
Dept: LAB | Facility: HOSPITAL | Age: 85
End: 2020-11-20
Payer: MEDICARE

## 2020-11-20 DIAGNOSIS — C22.1 MALIGNANT NEOPLASM OF INTRAHEPATIC BILE DUCTS (HCC): Primary | ICD-10-CM

## 2020-11-20 DIAGNOSIS — C22.1 MALIGNANT NEOPLASM OF INTRAHEPATIC BILE DUCTS (HCC): ICD-10-CM

## 2020-11-20 LAB
ALBUMIN SERPL BCP-MCNC: 3.7 G/DL (ref 3.5–5)
ALP SERPL-CCNC: 116 U/L (ref 46–116)
ALT SERPL W P-5'-P-CCNC: 14 U/L (ref 12–78)
ANION GAP SERPL CALCULATED.3IONS-SCNC: 5 MMOL/L (ref 4–13)
AST SERPL W P-5'-P-CCNC: 13 U/L (ref 5–45)
BASOPHILS # BLD AUTO: 0.03 THOUSANDS/ΜL (ref 0–0.1)
BASOPHILS NFR BLD AUTO: 1 % (ref 0–1)
BILIRUB SERPL-MCNC: 0.46 MG/DL (ref 0.2–1)
BUN SERPL-MCNC: 17 MG/DL (ref 5–25)
CALCIUM SERPL-MCNC: 9.4 MG/DL (ref 8.3–10.1)
CHLORIDE SERPL-SCNC: 109 MMOL/L (ref 100–108)
CO2 SERPL-SCNC: 27 MMOL/L (ref 21–32)
CREAT SERPL-MCNC: 0.94 MG/DL (ref 0.6–1.3)
EOSINOPHIL # BLD AUTO: 0.04 THOUSAND/ΜL (ref 0–0.61)
EOSINOPHIL NFR BLD AUTO: 1 % (ref 0–6)
ERYTHROCYTE [DISTWIDTH] IN BLOOD BY AUTOMATED COUNT: 15.2 % (ref 11.6–15.1)
GFR SERPL CREATININE-BSD FRML MDRD: 53 ML/MIN/1.73SQ M
GLUCOSE P FAST SERPL-MCNC: 113 MG/DL (ref 65–99)
HCT VFR BLD AUTO: 35.3 % (ref 34.8–46.1)
HGB BLD-MCNC: 11 G/DL (ref 11.5–15.4)
IMM GRANULOCYTES # BLD AUTO: 0.01 THOUSAND/UL (ref 0–0.2)
IMM GRANULOCYTES NFR BLD AUTO: 0 % (ref 0–2)
INR PPP: 1.3 (ref 0.84–1.19)
LYMPHOCYTES # BLD AUTO: 1.14 THOUSANDS/ΜL (ref 0.6–4.47)
LYMPHOCYTES NFR BLD AUTO: 18 % (ref 14–44)
MCH RBC QN AUTO: 28.1 PG (ref 26.8–34.3)
MCHC RBC AUTO-ENTMCNC: 31.2 G/DL (ref 31.4–37.4)
MCV RBC AUTO: 90 FL (ref 82–98)
MONOCYTES # BLD AUTO: 0.74 THOUSAND/ΜL (ref 0.17–1.22)
MONOCYTES NFR BLD AUTO: 11 % (ref 4–12)
NEUTROPHILS # BLD AUTO: 4.54 THOUSANDS/ΜL (ref 1.85–7.62)
NEUTS SEG NFR BLD AUTO: 69 % (ref 43–75)
NRBC BLD AUTO-RTO: 0 /100 WBCS
PLATELET # BLD AUTO: 168 THOUSANDS/UL (ref 149–390)
PMV BLD AUTO: 11.8 FL (ref 8.9–12.7)
POTASSIUM SERPL-SCNC: 3.9 MMOL/L (ref 3.5–5.3)
PROT SERPL-MCNC: 7.2 G/DL (ref 6.4–8.2)
PROTHROMBIN TIME: 16.2 SECONDS (ref 11.6–14.5)
RBC # BLD AUTO: 3.91 MILLION/UL (ref 3.81–5.12)
SODIUM SERPL-SCNC: 141 MMOL/L (ref 136–145)
WBC # BLD AUTO: 6.5 THOUSAND/UL (ref 4.31–10.16)

## 2020-11-20 PROCEDURE — 85610 PROTHROMBIN TIME: CPT

## 2020-11-20 PROCEDURE — 85025 COMPLETE CBC W/AUTO DIFF WBC: CPT

## 2020-11-20 PROCEDURE — 80053 COMPREHEN METABOLIC PANEL: CPT

## 2020-11-20 PROCEDURE — 36415 COLL VENOUS BLD VENIPUNCTURE: CPT

## 2020-11-20 RX ORDER — HYDROCODONE BITARTRATE AND ACETAMINOPHEN 5; 325 MG/1; MG/1
1 TABLET ORAL EVERY 8 HOURS PRN
COMMUNITY
Start: 2020-10-31

## 2020-11-24 ENCOUNTER — OFFICE VISIT (OUTPATIENT)
Dept: FAMILY MEDICINE CLINIC | Facility: HOSPITAL | Age: 85
End: 2020-11-24
Payer: MEDICARE

## 2020-11-24 VITALS
HEART RATE: 76 BPM | BODY MASS INDEX: 20.19 KG/M2 | WEIGHT: 114 LBS | DIASTOLIC BLOOD PRESSURE: 76 MMHG | SYSTOLIC BLOOD PRESSURE: 128 MMHG

## 2020-11-24 DIAGNOSIS — R11.0 NAUSEA: ICD-10-CM

## 2020-11-24 DIAGNOSIS — C22.9 ADENOCARCINOMA DETERMINED BY BIOPSY OF LIVER (HCC): ICD-10-CM

## 2020-11-24 DIAGNOSIS — R33.9 URINARY RETENTION: Primary | ICD-10-CM

## 2020-11-24 DIAGNOSIS — N18.31 STAGE 3A CHRONIC KIDNEY DISEASE (HCC): Chronic | ICD-10-CM

## 2020-11-24 DIAGNOSIS — I50.32 CHRONIC DIASTOLIC CONGESTIVE HEART FAILURE (HCC): Chronic | ICD-10-CM

## 2020-11-24 PROCEDURE — 99213 OFFICE O/P EST LOW 20 MIN: CPT | Performed by: INTERNAL MEDICINE

## 2020-11-24 RX ORDER — HYDROCODONE BITARTRATE AND ACETAMINOPHEN 7.5; 325 MG/1; MG/1
1 TABLET ORAL EVERY 8 HOURS PRN
COMMUNITY
Start: 2020-11-20 | End: 2020-11-24 | Stop reason: SDUPTHER

## 2020-12-03 ENCOUNTER — TELEPHONE (OUTPATIENT)
Dept: FAMILY MEDICINE CLINIC | Facility: HOSPITAL | Age: 85
End: 2020-12-03

## 2020-12-04 ENCOUNTER — TELEPHONE (OUTPATIENT)
Dept: FAMILY MEDICINE CLINIC | Facility: HOSPITAL | Age: 85
End: 2020-12-04

## 2020-12-04 ENCOUNTER — APPOINTMENT (EMERGENCY)
Dept: CT IMAGING | Facility: HOSPITAL | Age: 85
End: 2020-12-04
Payer: MEDICARE

## 2020-12-04 ENCOUNTER — HOSPITAL ENCOUNTER (OUTPATIENT)
Dept: RADIOLOGY | Facility: HOSPITAL | Age: 85
Discharge: HOME | End: 2020-12-04
Attending: INTERNAL MEDICINE
Payer: MEDICARE

## 2020-12-04 ENCOUNTER — HOSPITAL ENCOUNTER (EMERGENCY)
Facility: HOSPITAL | Age: 85
Discharge: HOME | End: 2020-12-04
Attending: EMERGENCY MEDICINE | Admitting: EMERGENCY MEDICINE
Payer: MEDICARE

## 2020-12-04 VITALS
HEART RATE: 76 BPM | TEMPERATURE: 98.3 F | DIASTOLIC BLOOD PRESSURE: 75 MMHG | SYSTOLIC BLOOD PRESSURE: 158 MMHG | HEIGHT: 63 IN | WEIGHT: 115 LBS | OXYGEN SATURATION: 99 % | RESPIRATION RATE: 14 BRPM | BODY MASS INDEX: 20.38 KG/M2

## 2020-12-04 DIAGNOSIS — R10.9 ABDOMINAL PAIN: Primary | ICD-10-CM

## 2020-12-04 DIAGNOSIS — C78.6 PERITONEAL CARCINOMATOSIS (HCC): ICD-10-CM

## 2020-12-04 DIAGNOSIS — R18.8 ASCITES: ICD-10-CM

## 2020-12-04 DIAGNOSIS — C22.1 CHOLANGIOCARCINOMA (HCC): ICD-10-CM

## 2020-12-04 DIAGNOSIS — R10.30 LOWER ABDOMINAL PAIN: Primary | ICD-10-CM

## 2020-12-04 DIAGNOSIS — R10.30 LOWER ABDOMINAL PAIN: ICD-10-CM

## 2020-12-04 LAB
ALBUMIN SERPL BCP-MCNC: 3.2 G/DL (ref 3.5–5)
ALP SERPL-CCNC: 120 U/L (ref 46–116)
ALT SERPL W P-5'-P-CCNC: 11 U/L (ref 12–78)
ANION GAP SERPL CALCULATED.3IONS-SCNC: 7 MMOL/L (ref 4–13)
AST SERPL W P-5'-P-CCNC: 15 U/L (ref 5–45)
BACTERIA UR QL AUTO: NORMAL /HPF
BASOPHILS # BLD AUTO: 0.02 THOUSANDS/ΜL (ref 0–0.1)
BASOPHILS NFR BLD AUTO: 0 % (ref 0–1)
BILIRUB SERPL-MCNC: 0.5 MG/DL (ref 0.2–1)
BILIRUB UR QL STRIP: NEGATIVE
BUN SERPL-MCNC: 15 MG/DL (ref 5–25)
CALCIUM ALBUM COR SERPL-MCNC: 9.2 MG/DL (ref 8.3–10.1)
CALCIUM SERPL-MCNC: 8.6 MG/DL (ref 8.3–10.1)
CHLORIDE SERPL-SCNC: 102 MMOL/L (ref 100–108)
CLARITY UR: CLEAR
CO2 SERPL-SCNC: 29 MMOL/L (ref 21–32)
COLOR UR: YELLOW
CREAT SERPL-MCNC: 1.04 MG/DL (ref 0.6–1.3)
EOSINOPHIL # BLD AUTO: 0.06 THOUSAND/ΜL (ref 0–0.61)
EOSINOPHIL NFR BLD AUTO: 1 % (ref 0–6)
ERYTHROCYTE [DISTWIDTH] IN BLOOD BY AUTOMATED COUNT: 14.8 % (ref 11.6–15.1)
GFR SERPL CREATININE-BSD FRML MDRD: 47 ML/MIN/1.73SQ M
GLUCOSE SERPL-MCNC: 104 MG/DL (ref 65–140)
GLUCOSE UR STRIP-MCNC: NEGATIVE MG/DL
HCT VFR BLD AUTO: 33.6 % (ref 34.8–46.1)
HGB BLD-MCNC: 10.5 G/DL (ref 11.5–15.4)
HGB UR QL STRIP.AUTO: ABNORMAL
IMM GRANULOCYTES # BLD AUTO: 0.02 THOUSAND/UL (ref 0–0.2)
IMM GRANULOCYTES NFR BLD AUTO: 0 % (ref 0–2)
KETONES UR STRIP-MCNC: NEGATIVE MG/DL
LEUKOCYTE ESTERASE UR QL STRIP: NEGATIVE
LIPASE SERPL-CCNC: 62 U/L (ref 73–393)
LYMPHOCYTES # BLD AUTO: 1.23 THOUSANDS/ΜL (ref 0.6–4.47)
LYMPHOCYTES NFR BLD AUTO: 16 % (ref 14–44)
MCH RBC QN AUTO: 28 PG (ref 26.8–34.3)
MCHC RBC AUTO-ENTMCNC: 31.3 G/DL (ref 31.4–37.4)
MCV RBC AUTO: 90 FL (ref 82–98)
MONOCYTES # BLD AUTO: 0.96 THOUSAND/ΜL (ref 0.17–1.22)
MONOCYTES NFR BLD AUTO: 13 % (ref 4–12)
NEUTROPHILS # BLD AUTO: 5.3 THOUSANDS/ΜL (ref 1.85–7.62)
NEUTS SEG NFR BLD AUTO: 70 % (ref 43–75)
NITRITE UR QL STRIP: NEGATIVE
NON-SQ EPI CELLS URNS QL MICRO: NORMAL /HPF
NRBC BLD AUTO-RTO: 0 /100 WBCS
PH UR STRIP.AUTO: 7 [PH]
PLATELET # BLD AUTO: 185 THOUSANDS/UL (ref 149–390)
PMV BLD AUTO: 10.6 FL (ref 8.9–12.7)
POTASSIUM SERPL-SCNC: 3.5 MMOL/L (ref 3.5–5.3)
PROT SERPL-MCNC: 6.8 G/DL (ref 6.4–8.2)
PROT UR STRIP-MCNC: NEGATIVE MG/DL
RBC # BLD AUTO: 3.75 MILLION/UL (ref 3.81–5.12)
RBC #/AREA URNS AUTO: NORMAL /HPF
SODIUM SERPL-SCNC: 138 MMOL/L (ref 136–145)
SP GR UR STRIP.AUTO: 1.01 (ref 1–1.03)
UROBILINOGEN UR QL STRIP.AUTO: 0.2 E.U./DL
WBC # BLD AUTO: 7.59 THOUSAND/UL (ref 4.31–10.16)
WBC #/AREA URNS AUTO: NORMAL /HPF

## 2020-12-04 PROCEDURE — 85025 COMPLETE CBC W/AUTO DIFF WBC: CPT | Performed by: EMERGENCY MEDICINE

## 2020-12-04 PROCEDURE — 99284 EMERGENCY DEPT VISIT MOD MDM: CPT

## 2020-12-04 PROCEDURE — 99285 EMERGENCY DEPT VISIT HI MDM: CPT | Performed by: EMERGENCY MEDICINE

## 2020-12-04 PROCEDURE — 80053 COMPREHEN METABOLIC PANEL: CPT | Performed by: EMERGENCY MEDICINE

## 2020-12-04 PROCEDURE — 36415 COLL VENOUS BLD VENIPUNCTURE: CPT | Performed by: EMERGENCY MEDICINE

## 2020-12-04 PROCEDURE — 74022 RADEX COMPL AQT ABD SERIES: CPT

## 2020-12-04 PROCEDURE — G1004 CDSM NDSC: HCPCS

## 2020-12-04 PROCEDURE — 81001 URINALYSIS AUTO W/SCOPE: CPT | Performed by: EMERGENCY MEDICINE

## 2020-12-04 PROCEDURE — 96360 HYDRATION IV INFUSION INIT: CPT

## 2020-12-04 PROCEDURE — 74177 CT ABD & PELVIS W/CONTRAST: CPT

## 2020-12-04 PROCEDURE — 83690 ASSAY OF LIPASE: CPT | Performed by: EMERGENCY MEDICINE

## 2020-12-04 PROCEDURE — 93005 ELECTROCARDIOGRAM TRACING: CPT

## 2020-12-04 RX ORDER — HYDROCODONE BITARTRATE AND ACETAMINOPHEN 5; 325 MG/1; MG/1
1 TABLET ORAL ONCE
Status: COMPLETED | OUTPATIENT
Start: 2020-12-04 | End: 2020-12-04

## 2020-12-04 RX ADMIN — IOHEXOL 100 ML: 350 INJECTION, SOLUTION INTRAVENOUS at 19:17

## 2020-12-04 RX ADMIN — SODIUM CHLORIDE 500 ML: 0.9 INJECTION, SOLUTION INTRAVENOUS at 18:15

## 2020-12-04 RX ADMIN — HYDROCODONE BITARTRATE AND ACETAMINOPHEN 1 TABLET: 5; 325 TABLET ORAL at 20:05

## 2020-12-06 LAB
ATRIAL RATE: 288 BPM
QRS AXIS: 2 DEGREES
QRSD INTERVAL: 86 MS
QT INTERVAL: 400 MS
QTC INTERVAL: 440 MS
T WAVE AXIS: -52 DEGREES
VENTRICULAR RATE: 73 BPM

## 2020-12-06 PROCEDURE — 93010 ELECTROCARDIOGRAM REPORT: CPT | Performed by: INTERNAL MEDICINE

## 2021-11-22 NOTE — PROGRESS NOTES
Assessment    1  Aortic valve disorder (424 1) (I35 9)   · POST OP AVR--[facility] MACIEL--DR RUDD--BOVINE   2  Arteriosclerosis of carotid artery (433 10) (I65 29)   3  Arteriosclerosis of coronary artery (414 00) (I25 10)   4  Cervical spine arthritis (721 0) (M46 92)   5  Chronic cervical radiculopathy (723 4) (M54 12)   6  Hyperlipidemia (272 4) (E78 5)   7  Essential hypertension (401 9) (I10)   8  S/P AVR (aortic valve replacement) (V43 3) (Z95 2)   9  Osteoporosis screening (V82 81) (Z13 820)    Plan  Arteriosclerosis of carotid artery    · VAS CAROTID COMPLETE STUDY; SIDE:Bilateral; Status:Hold For - Scheduling;  Requested for:18Apr2017;   Osteoporosis screening    · * DXA BONE DENSITY SPINE HIP AND PELVIS; Status:Hold For - Scheduling;  Requested for:18Apr2017; Unspecified atrial fibrillation    · Multaq 400 MG Oral Tablet; TAKE ONE (1) TABLET(S) TWICE DAILY WITH  MORNING AND EVENING MEAL    Discussion/Summary  Impression: Subsequent Annual Wellness Visit  Cardiovascular screening and counseling: screening is current, counseling was given on maintaining a healthy diet and counseling was given on maintaining a healthy weight  Diabetes screening and counseling: screening is current  Colorectal cancer screening and counseling: screening is current and sees gyn at [facility]- old had chronic fungal infection  Breast cancer screening and counseling: screening is current  Cervical cancer screening and counseling: the risks and benefits of screening were discussed and screening is current  Osteoporosis screening and counseling: due for DXA axial skeleton  Possible side effects of new medications were reviewed with the patient/guardian today  The treatment plan was reviewed with the patient/guardian  The patient/guardian understands and agrees with the treatment plan     Self Referrals: No      Chief Complaint  pt here for  wellness today  no depression and no falls----forms ordered   dk    nsr since last here       History of Present Illness  HPI: I reviewed recent testing- had carotid with some left sided narrowing not critical but was to have repeat in 6 months  Still playing tennis regualrly    Welcome to [employer] and Wellness Visits: The patient is being seen for the subsequent annual wellness visit  Medicare Screening and Risk Factors   Hospitalizations: no previous hospitalizations  Medicare Screening Tests Risk Questions   Abdominal aortic aneurysm risk assessment: none indicated  Osteoporosis risk assessment: , female gender and over 48years of age, but none indicated  Drug and Alcohol Use: The patient is a former cigarette smoker and quit smoking 1970  The patient reports occasional alcohol use  She has never used illicit drugs  Diet and Physical Activity: Current diet includes well balanced meals, 1 servings of fruit per day, 1 servings of vegetables per day, 1 servings of whole grains per day, 1 servings of dairy products per day and 2 cups of coffee per day  She exercises daily and exercises 7 times per week  Exercise: walking 60 minutes per day  Mood Disorder and Cognitive Impairment Screening:   Depression screening  negative for symptoms  Functional Ability/Level of Safety: Hearing is significantly decreased, significantly decreased in the right ear, significantly decreased in the left ear and a hearing aid is used  The patient is currently able to do activities of daily living without limitations, able to do instrumental activities of daily living without limitations, able to participate in social activities without limitations and able to drive without limitations  Fall risk factors: The patient fell 0 times in the past 12 months  Advance Directives: Advance directives: living will, durable power of  for health care directives, advance directives and bring in copy of this- has deisgnated poa   end of life decisions were reviewed with the patient and I agree with the patient's decisions  Concerns with the patient's end of life decisions: dnr level 3  Co-Managers and Medical Equipment/Suppliers: See Patient Care Team   Reviewed Updated [address] 14:   Last Medicare Wellness Visit Information was reviewed, patient interviewed, no change since last AWV  Preventive Quality Program 65 and Older: Falls Risk: The patient fell 0 times in the past 12 months  Patient Care Team    Care Team Member Role Specialty Office Number   Margarita Vaca MD  Cardiology [phone]   Jake Wang MD  Vascular Surgery [phone], Ronald COREY  Orthopedic Surgery [phone]   Clearence Dose DO  Pain Management [phone]   Yovani DALTON  Specialist Neurosurgery [phone] [address]  Pain Management [phone], [facility] Attending Internal Medicine [phone]     Review of Systems    Constitutional: negative  Eyes: negative  ENT: negative  Cardiovascular: negative  Respiratory: negative  Gastrointestinal: negative  Genitourinary: negative  Musculoskeletal: negative  Integumentary and Breasts: negative  Neurological: negative  Psychiatric: negative  Endocrine: negative  Hematologic and Lymphatic: negative  Active Problems    1  Active advance directive (V49 89) (Z78 9)   2  Aortic valve disorder (424 1) (I35 9)   3  Arteriosclerosis of carotid artery (433 10) (I65 29)   4  Arteriosclerosis of coronary artery (414 00) (I25 10)   5  Bilateral edema of lower extremity (782 3) (R60 0)   6  Breast cancer screening (V76 10) (Z12 39)   7  Breast tenderness in female (611 71) (N64 4)   8  CABG   9  Cataract (366 9) (H26 9)   10  Cervical spine arthritis (721 0) (M46 92)   11  Cholelithiasis without cholecystitis (574 20) (K80 20)   12  Chronic cervical radiculopathy (723 4) (M54 12)   13  Coronary artery disease (414 00) (I25 10)   14  Cystitis, chronic (595 2) (N30 20)   15   Encounter for screening mammogram for breast cancer (V76 12) (Z12 31)   16  Essential hypertension (401 9) (I10)   17  Hyperlipidemia (272 4) (E78 5)   18  Leg wound, right (891 0) (S81 801A)   19  Mitral insufficiency and aortic stenosis (396 2) (I08 0)   20  Nausea (787 02) (R11 0)   21  Need for influenza vaccination (V04 81) (Z23)   22  Neural foraminal stenosis of cervical spine (723 0) (M99 81)   23  Occipital neuralgia of right side (723 8) (M54 81)   24  Open wound of hand (882 0) (S61 409A)   25  Open wound of lower leg, right, initial encounter (891 0) (S81 801A)   26  Open wound of lower leg, right, subsequent encounter (V58 89,891 0) (S81 801D)   27  Osteoarthritis (715 90) (M19 90)   28  Osteoporosis screening (V82 81) (Z13 820)   29  Pain in wrist, unspecified laterality (719 43) (M25 539)   30  Pain syndrome, chronic (338 4) (G89 4)   31  Pelvic pain (R10 2)   32  Peripheral neuropathy (356 9) (G62 9)   33  Right cervical radiculopathy (723 4) (M54 12)   34  S/P AVR (aortic valve replacement) (V43 3) (Z95 2)   35  Screening for genitourinary condition (V81 6) (Z13 89)   36  Spondylolisthesis, acquired (738 4) (M43 10)   37  Thrombocytopenia (287 5) (D69 6)   38  Tracheobronchitis (490) (J40)   39  Traumatic open wound of left lower leg (891 0) (S81 802A)   40  Unspecified atrial fibrillation (427 31) (I48 91)   41  Upper respiratory infection (465 9) (J06 9)   42  Urinary tract infection (599 0) (N39 0)   43  Wound of left lower extremity, initial encounter (894 0) (S81 802A)   44   Wound of left lower extremity, subsequent encounter (V58 89,894 0) (L11 655U)    Past Medical History    · History of Abdominal pain, epigastric (789 06) (R10 13)   · History of Acute myocardial infarction (410 90) (I21 3)   · History of Asymptomatic postmenopausal status (V49 81) (Z78 0)   · History of CAD (coronary artery disease) (414 00) (I25 10)   · History of Cellulitis and abscess (682 9) (L03 90,L02 91)   · History of Cholecystitis (575 10) (K81 9)   · History of Closed Colles' Fracture Of The Left Wrist (813 41)   · History of Closed Fracture Of The Radius (813 81)   · History of Dyspnea (786 09) (R06 00)   · History of Hair loss disorder (704 00) (L65 9)   · History of Hematuria (599 70) (R31 9)   · History of being hospitalized (V13 9) (Z92 89)   · History of chest pain (V13 89) (U50 026)   · History of hypercholesterolemia (V12 29) (Z86 39)   · History of hypertension (V12 59) (Z86 79)   · History of sick sinus syndrome (V12 59) (Z86 79)   · History of urinary tract infection (V13 02) (Z87 440)   · History of Incomplete bladder emptying (788 21) (R33 9)   · History of Leg wound, left (891 0) (S81 802A)   · History of Malaise and fatigue (780 79) (R53 81,R53 83)   · History of Urinary frequency (788 41) (R35 0)   · History of UTI (urinary tract infection) (599 0) (N39 0)    Surgical History    · History of CABG   · History of Cataract Extraction   · History of Heart Valve Replacement    Family History  Mother    · Family history of    · Family history of Diabetes Mellitus (V18 0)   · Family history of Heart disease   · Family history of Hypertension (V17 49)  Father    · Family history of    · Family history of Prostate cancer  Brother    · Family history of    · Family history of Dementia  Maternal Grandmother    · Family history of Heart disease   · Family history of Hypertension (V17 49)  Maternal Grandfather    · Family history of Heart disease  Family History    · Family history of hypertension (V17 49) (Z82 49)   · Family history of Osteoporosis (V17 81)    The family history was reviewed and updated today         Social History    · Active advance directive (V49 89) (Z78 9)   · Being A Social Drinker   · Caffeine use (V49 89) (F15 90)   · 1 1/2 CUPS COFFEE QD   · Cultural background   · NOT  OR    · Denied: History of Drug Use   · Exercise habits   · Former smoker (V15 82) (Z87 891)   · QUIT 40 YEARS AGO   · Good dental hygiene   · Living Situation: Supportive and safe   · Never a smoker   · Primary spoken language English   · Racial background   · WHITE   · Retired   · Denied: History of Unemployed, not looking for work   · Denied: History of Uses  drugs   ·   The social history was reviewed and updated today  Current Meds   1  AmLODIPine Besylate 5 MG Oral Tablet; Take one tablet daily as directed; Therapy: 27XFC2412 to (Teresa Vazquez)  Requested for: 35RQP3724; Last   Rx:10Jan2017 Ordered   2  Aspirin EC Low Dose 81 MG Oral Tablet Delayed Release; TAKE 1 TABLET DAILY; Therapy: (Recorded:51Scn1525) to Recorded   3  Atorvastatin Calcium 20 MG Oral Tablet; take one tablet by mouth every day; Therapy: 73KLJ5401 to (96 327254)  Requested for: 44GXZ6629; Last   Rx:04Apr2017 Ordered   4  CoQ10 CAPS; TAKE 1 CAPSULE DAILY WITH A MEAL; Therapy: (Recorded:05May2014) to Recorded   5  Flonase Allergy Relief 50 MCG/ACT Nasal Suspension; 1 spray each nostril bid x 7 days; Therapy: 70Yff8454 to (Evaluate:18Cql1386)  Requested for: 09Rwx5519; Last   Rx:21Jul2016 Ordered   6  Folic Acid 1 MG Oral Tablet; TAKE 1 TABLET DAILY AS DIRECTED; Therapy: 61TEI8291 to (Evaluate:01Jan2017)  Requested for: 99JJZ4680; Last   Rx:77Bew4136 Ordered   7  Lidocaine HCl - 4 % External Solution; [facility] prior to    debridement parn pain control; Therapy: (Recorded:03Vvd3111) to Recorded   8  Multaq 400 MG Oral Tablet; TAKE ONE (1) TABLET(S) TWIC E DAILY WITH MORNIN G   AND EVENING MEAL; Therapy: 74DRR8404 to (Evaluate:10May2017)  Requested for: 46LRS9062; Last   Rx:10Jan2017 Ordered   9  Ocuvite Oral Tablet; TAKE 1 TABLET DAILY; Therapy: (Recorded:74Rrm8404) to Recorded   10  Omega 3 CAPS; 1000 mg CAPSULE---1 DAILY; Therapy: (Recorded:08Jsm0338) to Recorded   11  Tramadol-Acetaminophen 37 5-325 MG Oral Tablet; TAKE 1 TABLET 4 times daily PRN    neck pain;     Therapy: 75FLQ1300 to (Evaluate:66Xzk8263); Last Rx:04Apr2017 Ordered    The medication list was reviewed and updated today  Allergies    1  Heparin    2  No Known Environmental Allergies   3  No Known Food Allergies    Immunizations   1 2 3    Hepatitis A  30-Casa-1998 Oct 1998     Influenza  27-Aug-2013 06-Oct-2015 14-Nov-2016    PPSV  15-Aug-2006      Td/DT  Jun 2009      Zoster  May 2013       Vitals  Signs    Heart Rate: 60  Respiration: 16  Systolic: 752  Diastolic: 64  Height: 5 ft 3 in  Weight: 120 lb   BMI Calculated: 21 26  BSA Calculated: 1 56    Physical Exam    Constitutional   General appearance: No acute distress, well appearing and well nourished  Eyes   Conjunctiva and lids: No swelling, erythema or discharge  Ears, Nose, Mouth, and Throat   Otoscopic examination: Tympanic membranes translucent with normal light reflex  Canals patent without erythema  Nasal mucosa, septum, and turbinates: Normal without edema or erythema  Lips, teeth, and gums: Normal, good dentition  Neck   Neck: Supple, symmetric, trachea midline, no masses  Pulmonary   Auscultation of lungs: Clear to auscultation  Cardiovascular   Auscultation of heart: Abnormal   crisp valve sounds  Examination of extremities for edema and/or varicosities: Abnormal   trace right ankle edema  Results/Data  Falls Risk Assessment (Dx Z13 89 Screen for Neurologic Disorder) 09CGT1004 02:24PM User, Ahs     Test Name Result Flag Reference   Falls Risk      No falls in the past year     PHQ-2 Adult Depression Screening 18Apr2017 02:23PM User, Ahs     Test Name Result Flag Reference   PHQ-2 Adult Depression Score 0     Over the last two weeks, how often have you been bothered by any of the following problems? Little interest or pleasure in doing things: Not at all - 0  Feeling down, depressed, or hopeless: Not at all - 0   PHQ-2 Adult Depression Screening Negative         Signatures   Electronically signed by : Omaira Payton DO;  Apr 18 2017 2:55PM EST                       (Author) normal balance

## 2022-10-03 NOTE — TELEPHONE ENCOUNTER
Pt is scheduled for 3:30 today Cheiloplasty (Complex) Text: A decision was made to reconstruct the defect with a  cheiloplasty.  The defect was undermined extensively.  Additional obicularis oris muscle was excised with a 15 blade scalpel.  The defect was converted into a full thickness wedge to facilite a better cosmetic result.  Small vessels were then tied off with 5-0 monocyrl. The obicularis oris, superficial fascia, adipose and dermis were then reapproximated.  After the deeper layers were approximated the epidermis was reapproximated with particular care given to realign the vermilion border.

## 2025-02-01 NOTE — TELEPHONE ENCOUNTER
Pts daughter Mohini Sow is requesting the MRI results to be sent to physical therapy   Fax# [phone] ELIZABETH Tejada made aware

## (undated) DEVICE — TUBING SUCTION 5MM X 12 FT

## (undated) DEVICE — INTENDED FOR TISSUE SEPARATION, AND OTHER PROCEDURES THAT REQUIRE A SHARP SURGICAL BLADE TO PUNCTURE OR CUT.: Brand: BARD-PARKER SAFETY BLADES SIZE 11, STERILE

## (undated) DEVICE — SYRINGE 3ML LL

## (undated) DEVICE — SUT ETHILON 3-0 PS-1 18 IN 1663H

## (undated) DEVICE — TUBING ARTHROSCOPIC WAVE  MAIN PUMP

## (undated) DEVICE — VIAL DECANTER

## (undated) DEVICE — OCCLUSIVE GAUZE STRIP,3% BISMUTH TRIBROMOPHENATE IN PETROLATUM BLEND: Brand: XEROFORM

## (undated) DEVICE — 2000CC GUARDIAN II: Brand: GUARDIAN

## (undated) DEVICE — CHLORAPREP HI-LITE 26ML ORANGE

## (undated) DEVICE — GLOVE SRG BIOGEL ORTHOPEDIC 7.5

## (undated) DEVICE — GLOVE SRG BIOGEL 7.5

## (undated) DEVICE — IMPERVIOUS STOCKINETTE: Brand: DEROYAL

## (undated) DEVICE — CAST PADDING 6 IN STERILE

## (undated) DEVICE — 3000CC GUARDIAN II: Brand: GUARDIAN

## (undated) DEVICE — ACE WRAP 6 IN UNSTERILE

## (undated) DEVICE — NEEDLE HYPO 22G X 1-1/2 IN

## (undated) DEVICE — GLOVE INDICATOR PI UNDERGLOVE SZ 8 BLUE

## (undated) DEVICE — NEEDLE 18 G X 1 1/2

## (undated) DEVICE — SYRINGE 30ML LL

## (undated) DEVICE — BLADE SHAVER EXCALIBUR 4MM 13CM COOLCUT

## (undated) DEVICE — FILTER STRAW 1.7

## (undated) DEVICE — BETHLEHEM UNIVERSAL  ARTHRO PK: Brand: CARDINAL HEALTH